# Patient Record
Sex: MALE | Race: BLACK OR AFRICAN AMERICAN | Employment: OTHER | URBAN - METROPOLITAN AREA
[De-identification: names, ages, dates, MRNs, and addresses within clinical notes are randomized per-mention and may not be internally consistent; named-entity substitution may affect disease eponyms.]

---

## 2015-07-15 LAB
LEFT VENTRICULAR EJECTION FRACTION MODE: NORMAL
LV EF: 47 %

## 2017-10-02 PROBLEM — E66.09 NON MORBID OBESITY DUE TO EXCESS CALORIES: Status: ACTIVE | Noted: 2017-10-02

## 2017-10-02 PROBLEM — E78.5 DYSLIPIDEMIA: Status: ACTIVE | Noted: 2017-10-02

## 2017-10-02 PROBLEM — I51.9 LV DYSFUNCTION: Status: ACTIVE | Noted: 2017-10-02

## 2017-11-29 RX ORDER — FENTANYL CITRATE 50 UG/ML
INJECTION, SOLUTION INTRAMUSCULAR; INTRAVENOUS
Status: DISPENSED
Start: 2017-11-29 | End: 2017-11-30

## 2017-11-30 PROBLEM — K40.90 LEFT INGUINAL HERNIA: Status: ACTIVE | Noted: 2017-11-30

## 2018-03-12 ENCOUNTER — HOSPITAL ENCOUNTER (OUTPATIENT)
Dept: GENERAL RADIOLOGY | Age: 68
Discharge: HOME | End: 2018-03-14
Payer: MEDICARE

## 2018-03-12 ENCOUNTER — HOSPITAL ENCOUNTER (OUTPATIENT)
Dept: ULTRASOUND IMAGING | Age: 68
Discharge: HOME | End: 2018-03-14
Payer: MEDICARE

## 2018-03-12 DIAGNOSIS — R10.13 EPIGASTRIC ABDOMINAL PAIN: ICD-10-CM

## 2018-03-12 PROCEDURE — 74241 FL UGI W KUB: CPT

## 2018-03-12 PROCEDURE — 76705 ECHO EXAM OF ABDOMEN: CPT

## 2018-03-13 NOTE — PROGRESS NOTES
remainder of the day due to having had anesthesia. PARKING INSTRUCTIONS:   · [x] Arrival [id number]  · [x] Parking lot I is located on [facility] (the corner of [facility] and [facility]). To enter, press the button and the gate will lift. A free token will be provided to exit the lot. One car per patient is allowed to park in this lot. All other cars are to park on [address] [place] [address] Street either in the parking garage or the handicap lot. [] Free  parking is available on [address] [place] [address]. · [] To reach the [facility] lobby from [address] [place] [address], upon entering the hospital, take elevator B to the 3rd floor. EDUCATION INSTRUCTIONS:      [] Knee or hip replacement booklet & exercise pamphlets given. [] Carlos Norris placed in chart. [] Pre-admission Testing educational folder given  [] Incentive Spirometry,coughing & deep breathing exercises reviewed. []Medication information sheet(s)   []Fluoroscopy-Xray used in surgery reviewed with patient. Educational pamphlet placed in chart. []Pain: Post-op pain is normal and to be expected. You will be asked to rate your pain from 0-10(a zero is not acceptable-education is needed). Your post-op pain goal is:  [] Ask your nurse for your pain medication. [] Joint camp offered. [] Joint replacement booklets given. []Other     MEDICATION INSTRUCTIONS:   [x]Bring a complete list of your medications, please write the last time you took the medicine, give this list to the nurse. [x] Take the following medications the morning of surgery with 1-2 ounces of water:see medication list   [x] Stop herbal supplements and vitamins 5 days before your surgery. [] DO NOT take any diabetic medicine the morning of surgery. Follow instructions for insulin the day before surgery. [] If you are diabetic and your blood sugar is low or you feel symptomatic, you may drink 1-2 ounces of apple juice or take a glucose tablet.   The morning of your procedure, you may call the pre-op area if you have concerns about your blood sugar [phone]. [] Use your inhalers the morning of surgery. Bring your emergency inhaler with you day of surgery. [] Follow physician instructions regarding any blood thinners you may be taking. WHAT TO EXPECT:  [x] The day of surgery you will be greeted and  checked in by the [employer] .  A nurse will greet you in accordance to the time you are needed in the pre-op area to prepare you for surgery. Please do not be discouraged if you are not greeted in the order you arrive as there are many variables that are involved in patient preparation. Your patience is greatly appreciated as you wait for your nurse. Please bring in items such as: books, magazines, newspapers, electronics, or any other items  to occupy your time in the waiting area. [x]  Delays may occur with surgery and staff will make a sincere effort to keep you informed of delays. If any delays occur with your procedure, we apologize ahead of time for your inconvenience as we recognize the value of your time.

## 2018-03-16 ENCOUNTER — ANESTHESIA (OUTPATIENT)
Dept: ENDOSCOPY | Age: 68
End: 2018-03-16
Payer: MEDICARE

## 2018-03-16 ENCOUNTER — ANESTHESIA EVENT (OUTPATIENT)
Dept: ENDOSCOPY | Age: 68
End: 2018-03-16
Payer: MEDICARE

## 2018-03-16 ENCOUNTER — HOSPITAL ENCOUNTER (OUTPATIENT)
Age: 68
Setting detail: OUTPATIENT SURGERY
Discharge: HOME | End: 2018-03-16
Attending: SURGERY | Admitting: SURGERY
Payer: MEDICARE

## 2018-03-16 VITALS
BODY MASS INDEX: 30.48 KG/M2 | WEIGHT: 225 LBS | TEMPERATURE: 98 F | DIASTOLIC BLOOD PRESSURE: 79 MMHG | RESPIRATION RATE: 18 BRPM | HEIGHT: 72 IN | OXYGEN SATURATION: 95 % | HEART RATE: 48 BPM | SYSTOLIC BLOOD PRESSURE: 135 MMHG

## 2018-03-16 VITALS — TEMPERATURE: 98.6 F | SYSTOLIC BLOOD PRESSURE: 122 MMHG | OXYGEN SATURATION: 96 % | DIASTOLIC BLOOD PRESSURE: 77 MMHG

## 2018-03-16 PROCEDURE — 43239 EGD BIOPSY SINGLE/MULTIPLE: CPT | Performed by: SURGERY

## 2018-03-16 PROCEDURE — 2580000003 HC RX 258: Performed by: PHYSICIAN ASSISTANT

## 2018-03-16 PROCEDURE — 3700000001 HC ADD 15 MINUTES (ANESTHESIA): Performed by: SURGERY

## 2018-03-16 PROCEDURE — 88342 IMHCHEM/IMCYTCHM 1ST ANTB: CPT

## 2018-03-16 PROCEDURE — 6360000002 HC RX W HCPCS: Performed by: PHYSICIAN ASSISTANT

## 2018-03-16 PROCEDURE — 7100000010 HC PHASE II RECOVERY - FIRST 15 MIN: Performed by: SURGERY

## 2018-03-16 PROCEDURE — 3700000000 HC ANESTHESIA ATTENDED CARE: Performed by: SURGERY

## 2018-03-16 PROCEDURE — 3609012400 HC EGD TRANSORAL BIOPSY SINGLE/MULTIPLE: Performed by: SURGERY

## 2018-03-16 PROCEDURE — 7100000011 HC PHASE II RECOVERY - ADDTL 15 MIN: Performed by: SURGERY

## 2018-03-16 PROCEDURE — 88305 TISSUE EXAM BY PATHOLOGIST: CPT

## 2018-03-16 RX ORDER — SODIUM CHLORIDE, SODIUM LACTATE, POTASSIUM CHLORIDE, CALCIUM CHLORIDE 600; 310; 30; 20 MG/100ML; MG/100ML; MG/100ML; MG/100ML
INJECTION, SOLUTION INTRAVENOUS CONTINUOUS
Status: DISCONTINUED | OUTPATIENT
Start: 2018-03-16 | End: 2018-03-16 | Stop reason: HOSPADM

## 2018-03-16 RX ORDER — SODIUM CHLORIDE 0.9 % (FLUSH) 0.9 %
10 SYRINGE (ML) INJECTION EVERY 12 HOURS SCHEDULED
Status: DISCONTINUED | OUTPATIENT
Start: 2018-03-16 | End: 2018-03-16 | Stop reason: HOSPADM

## 2018-03-16 RX ORDER — SODIUM CHLORIDE 9 MG/ML
INJECTION, SOLUTION INTRAVENOUS CONTINUOUS PRN
Status: DISCONTINUED | OUTPATIENT
Start: 2018-03-16 | End: 2018-03-16 | Stop reason: SDUPTHER

## 2018-03-16 RX ORDER — PROPOFOL 10 MG/ML
INJECTION, EMULSION INTRAVENOUS PRN
Status: DISCONTINUED | OUTPATIENT
Start: 2018-03-16 | End: 2018-03-16 | Stop reason: SDUPTHER

## 2018-03-16 RX ORDER — SODIUM CHLORIDE 0.9 % (FLUSH) 0.9 %
10 SYRINGE (ML) INJECTION PRN
Status: DISCONTINUED | OUTPATIENT
Start: 2018-03-16 | End: 2018-03-16 | Stop reason: HOSPADM

## 2018-03-16 RX ADMIN — PROPOFOL 230 MG: 10 INJECTION, EMULSION INTRAVENOUS at 08:15

## 2018-03-16 RX ADMIN — SODIUM CHLORIDE: 9 INJECTION, SOLUTION INTRAVENOUS at 07:30

## 2018-03-16 ASSESSMENT — PAIN SCALES - GENERAL
PAINLEVEL_OUTOF10: 0
PAINLEVEL_OUTOF10: 0

## 2018-03-16 ASSESSMENT — PAIN - FUNCTIONAL ASSESSMENT: PAIN_FUNCTIONAL_ASSESSMENT: 0-10

## 2018-03-16 ASSESSMENT — LIFESTYLE VARIABLES: SMOKING_STATUS: 1

## 2018-03-16 NOTE — H&P
GENERAL SURGERY  H & P  3/17/2018     Subjective:  Patient presents to clinic with recurrent epigastric/substernal burning pain and intermittent acid taste in his mouth. He has prior EGD 2015 with Dr. Meryle Lapine that showed gastritis, but normal esophagus with no hiatal hernia, and biopsies were negative for H. Pylori. He has been taking protonix--recently increased from daily to twice daily without much relief. Denies dysphagia. The only thing that has helped is laying down. No nausea or vomiting. No regular ASA or NSAID use.      No f/c, n/v, unexpected weight loss, bloody or tarry emesis or bowel movements.     Objective:  Ht 6' (1.829 m)   Wt 225 lb (102.1 kg)   BMI 30.52 kg/m²    Gen: alert, oriented, no apparent distress  HEENT: NCAT, anicteric  CV: RR  Pulm: nonlabored breathing on room air  Abdomen: soft, nontender, nondistended. Lap incisions well healed. Extremities: moving all extremities  Skin: warm and dry     Assessment/Plan:  74 y.o. male with longstanding history of GERD, presenting with recurrent symptoms. Prior EGD showed gastritis, but no hiatal hernia.     EGD with biopsies.

## 2018-03-16 NOTE — BRIEF OP NOTE
Brief Postoperative Note  ______________________________________________________________    Patient: Ruby Chen.   YOB: 1950  MRN: [id number]  Date of Procedure: 3/16/2018    Pre-Op Diagnosis: EPIGASTRIC ABDOMINAL PAIN    Post-Op Diagnosis: Same       Procedure(s):  EGD BIOPSY    Anesthesia: Monitor Anesthesia Care    Surgeon(s):  Yobani Toure MD    Staff:  * No surgical staff found *     Estimated Blood Loss: * No values recorded between 3/16/2018  8:06 AM and 3/16/2018  2:27 AM * mL    Complications: None    Specimens:   * No specimens in log *    Implants:  * No implants in log *      Drains:      Findings: gastritis, Hiatal hernia with esophagitis    Yobani Toure MD  Date: 3/16/2018  Time: 8:22 AM

## 2018-03-16 NOTE — ANESTHESIA PRE PROCEDURE
Department of Anesthesiology  Preprocedure Note       Name:  Kenyetta Aponte. Age:  74 y.o.  :  1950                                          MRN:  [id number]         Date:  3/16/2018      Surgeon: Dayday Gonzalez):  Catrina Carbajal MD    Procedure: Procedure(s):  EGD ESOPHAGOGASTRODUODENOSCOPY    Medications prior to admission:   Prior to Admission medications    Medication Sig Start Date End Date Taking? Authorizing Provider   losartan (COZAAR) 100 MG tablet Take 1 tablet by mouth every morning 3/7/18  Yes Paul Almonte DO   amLODIPine (NORVASC) 10 MG tablet Take 1 tablet by mouth every morning (before breakfast) 3/7/18  Yes Paul Almonte DO   pantoprazole (PROTONIX) 40 MG tablet Take 1 tablet by mouth 2 times daily 3/7/18  Yes Paul Almonte DO   metoprolol succinate (TOPROL XL) 50 MG extended release tablet Take 1 tablet by mouth every morning 3/7/18  Yes Paul Almonte DO   atorvastatin (LIPITOR) 10 MG tablet Take 1 tablet by mouth nightly 3/7/18  Yes Paul Almonte DO   hydrochlorothiazide (HYDRODIURIL) 12.5 MG tablet Take 1 tablet by mouth daily 3/7/18  Yes Paul Almonte DO   allopurinol (ZYLOPRIM) 100 MG tablet Take 1 tablet by mouth 2 times daily 3/7/18  Yes Paul Almonte DO   cloNIDine (CATAPRES) 0.2 MG tablet Take 1 tablet by mouth 2 times daily 3/7/18  Yes Paul Almonte DO   famotidine (PEPCID) 20 MG tablet Take 1 tablet by mouth 2 times daily as needed (acid reflux) 18  Yes Hallie Brar DO   PERCOCET 7.5-325 MG per tablet Take 1 tablet by mouth every 8 hours as needed  .  17  Yes Historical Provider, MD   aspirin 81 MG EC tablet Take 1 tablet by mouth daily 17  Yes Hallie Brar DO   albuterol sulfate HFA (PROVENTIL HFA) 108 (90 BASE) MCG/ACT inhaler Inhale 2 puffs into the lungs 4 times daily 16  Yes Unique Briceno MD   sodium chloride (OCEAN) 0.65 % nasal spray 1 spray by Nasal route as needed for Congestion 16  Yes Hallie Brar, DO       Current 0.4 05/05/2016    ALKPHOS 102 05/05/2016    AST 16 05/05/2016    ALT 14 05/05/2016       POC Tests: No results for input(s): POCGLU, POCNA, POCK, POCCL, POCBUN, POCHEMO, POCHCT in the last 72 hours. Coags:   Lab Results   Component Value Date    PROTIME 13.5 11/26/2012    INR 1.3 11/26/2012       HCG (If Applicable): No results found for: PREGTESTUR, PREGSERUM, HCG, HCGQUANT     ABGs: No results found for: PHART, PO2ART, MHO5NEI, RFA1BWT, BEART, W6MSBAHI     Type & Screen (If Applicable):  No results found for: LABABO, [address]    Anesthesia Evaluation  Patient summary reviewed and Nursing notes reviewed no history of anesthetic complications:   Airway: Mallampati: III  TM distance: >3 FB   Neck ROM: full  Mouth opening: > = 3 FB Dental:    (+) edentulous      Pulmonary:normal exam    (+) current smoker          Patient smoked on day of surgery. Cardiovascular:    (+) hypertension:, past MI:, CAD:,             Echocardiogram reviewed         Beta Blocker:  Dose within 24 Hrs      ROS comment: LV dysfunction  Cardiomyopathy    Ejection fraction is visually estimated at 45-50% from 2012 echo     Neuro/Psych:   (+) psychiatric history:            GI/Hepatic/Renal:   (+) GERD: poorly controlled,           Endo/Other:    (+) malignancy/cancer (Primary tonsillar squamous cell carcinoma). Abdominal:           Vascular:                                        Anesthesia Plan      MAC     ASA 3       Induction: intravenous. Anesthetic plan and risks discussed with patient. Plan discussed with CRNA and attending. Attending anesthesiologist reviewed and agrees with Pre Eval content    NPO- 3/15/18 @ 1900 per pt         Alfie Johnston RN   3/16/2018    Patient seen and anesthesia pre-op assessment reviewed.   Agree with assessment and plan    Fritz Deshpande MD

## 2018-03-21 ENCOUNTER — OFFICE VISIT (OUTPATIENT)
Dept: SURGERY | Age: 68
End: 2018-03-21
Payer: MEDICARE

## 2018-03-21 VITALS
BODY MASS INDEX: 31.45 KG/M2 | DIASTOLIC BLOOD PRESSURE: 86 MMHG | WEIGHT: 232.2 LBS | TEMPERATURE: 97.6 F | HEIGHT: 72 IN | RESPIRATION RATE: 16 BRPM | HEART RATE: 68 BPM | OXYGEN SATURATION: 97 % | SYSTOLIC BLOOD PRESSURE: 141 MMHG

## 2018-03-21 DIAGNOSIS — K21.00 GERD WITH ESOPHAGITIS: ICD-10-CM

## 2018-03-21 PROCEDURE — G8598 ASA/ANTIPLAT THER USED: HCPCS | Performed by: SURGERY

## 2018-03-21 PROCEDURE — 3017F COLORECTAL CA SCREEN DOC REV: CPT | Performed by: SURGERY

## 2018-03-21 PROCEDURE — 4004F PT TOBACCO SCREEN RCVD TLK: CPT | Performed by: SURGERY

## 2018-03-21 PROCEDURE — G8482 FLU IMMUNIZE ORDER/ADMIN: HCPCS | Performed by: SURGERY

## 2018-03-21 PROCEDURE — G8427 DOCREV CUR MEDS BY ELIG CLIN: HCPCS | Performed by: SURGERY

## 2018-03-21 PROCEDURE — 99212 OFFICE O/P EST SF 10 MIN: CPT

## 2018-03-21 PROCEDURE — 4040F PNEUMOC VAC/ADMIN/RCVD: CPT | Performed by: SURGERY

## 2018-03-21 PROCEDURE — 1123F ACP DISCUSS/DSCN MKR DOCD: CPT | Performed by: SURGERY

## 2018-03-21 PROCEDURE — G8417 CALC BMI ABV UP PARAM F/U: HCPCS | Performed by: SURGERY

## 2018-03-21 PROCEDURE — 99213 OFFICE O/P EST LOW 20 MIN: CPT | Performed by: SURGERY

## 2018-04-23 ENCOUNTER — TELEPHONE (OUTPATIENT)
Dept: SURGERY | Age: 68
End: 2018-04-23

## 2018-05-10 ENCOUNTER — OFFICE VISIT (OUTPATIENT)
Dept: CARDIOLOGY CLINIC | Age: 68
End: 2018-05-10
Payer: MEDICARE

## 2018-05-10 VITALS
HEIGHT: 72 IN | BODY MASS INDEX: 30.23 KG/M2 | RESPIRATION RATE: 18 BRPM | DIASTOLIC BLOOD PRESSURE: 78 MMHG | WEIGHT: 223.2 LBS | SYSTOLIC BLOOD PRESSURE: 130 MMHG | HEART RATE: 52 BPM

## 2018-05-10 DIAGNOSIS — Z01.810 PREOP CARDIOVASCULAR EXAM: Primary | ICD-10-CM

## 2018-05-10 DIAGNOSIS — I25.10 CORONARY ARTERY DISEASE INVOLVING NATIVE CORONARY ARTERY OF NATIVE HEART WITHOUT ANGINA PECTORIS: ICD-10-CM

## 2018-05-10 PROCEDURE — 93000 ELECTROCARDIOGRAM COMPLETE: CPT | Performed by: INTERNAL MEDICINE

## 2018-05-10 PROCEDURE — 99214 OFFICE O/P EST MOD 30 MIN: CPT | Performed by: CLINICAL NURSE SPECIALIST

## 2018-05-14 ENCOUNTER — OFFICE VISIT (OUTPATIENT)
Dept: FAMILY MEDICINE CLINIC | Age: 68
End: 2018-05-14
Payer: MEDICARE

## 2018-05-14 VITALS
DIASTOLIC BLOOD PRESSURE: 84 MMHG | WEIGHT: 226 LBS | RESPIRATION RATE: 14 BRPM | HEART RATE: 87 BPM | HEIGHT: 72 IN | OXYGEN SATURATION: 99 % | SYSTOLIC BLOOD PRESSURE: 131 MMHG | BODY MASS INDEX: 30.61 KG/M2 | TEMPERATURE: 98.7 F

## 2018-05-14 DIAGNOSIS — G47.00 INSOMNIA, UNSPECIFIED TYPE: ICD-10-CM

## 2018-05-14 DIAGNOSIS — R05.9 COUGH: ICD-10-CM

## 2018-05-14 DIAGNOSIS — Z01.818 PRE-OPERATIVE CLEARANCE: Primary | ICD-10-CM

## 2018-05-14 PROCEDURE — G8598 ASA/ANTIPLAT THER USED: HCPCS | Performed by: FAMILY MEDICINE

## 2018-05-14 PROCEDURE — 4004F PT TOBACCO SCREEN RCVD TLK: CPT | Performed by: FAMILY MEDICINE

## 2018-05-14 PROCEDURE — 99213 OFFICE O/P EST LOW 20 MIN: CPT | Performed by: FAMILY MEDICINE

## 2018-05-14 PROCEDURE — G8427 DOCREV CUR MEDS BY ELIG CLIN: HCPCS | Performed by: FAMILY MEDICINE

## 2018-05-14 PROCEDURE — 1123F ACP DISCUSS/DSCN MKR DOCD: CPT | Performed by: FAMILY MEDICINE

## 2018-05-14 PROCEDURE — G8417 CALC BMI ABV UP PARAM F/U: HCPCS | Performed by: FAMILY MEDICINE

## 2018-05-14 PROCEDURE — 4040F PNEUMOC VAC/ADMIN/RCVD: CPT | Performed by: FAMILY MEDICINE

## 2018-05-14 PROCEDURE — 3017F COLORECTAL CA SCREEN DOC REV: CPT | Performed by: FAMILY MEDICINE

## 2018-05-14 PROCEDURE — 99212 OFFICE O/P EST SF 10 MIN: CPT | Performed by: FAMILY MEDICINE

## 2018-05-15 ENCOUNTER — HOSPITAL ENCOUNTER (OUTPATIENT)
Dept: GENERAL RADIOLOGY | Age: 68
Discharge: HOME | End: 2018-05-17
Payer: MEDICARE

## 2018-05-15 ENCOUNTER — HOSPITAL ENCOUNTER (OUTPATIENT)
Age: 68
Discharge: HOME | End: 2018-05-17
Payer: MEDICARE

## 2018-05-15 DIAGNOSIS — R05.9 COUGH: ICD-10-CM

## 2018-05-15 PROCEDURE — 71046 X-RAY EXAM CHEST 2 VIEWS: CPT

## 2018-05-22 ASSESSMENT — ENCOUNTER SYMPTOMS
HEARTBURN: 1
DIARRHEA: 0
WHEEZING: 0
SHORTNESS OF BREATH: 0
VOMITING: 0
CONSTIPATION: 0
NAUSEA: 1
COUGH: 0
ABDOMINAL PAIN: 1

## 2018-05-29 RX ORDER — ASPIRIN 81 MG/1
81 TABLET ORAL DAILY
Qty: 90 TABLET | Refills: 3 | Status: SHIPPED | OUTPATIENT
Start: 2018-05-29 | End: 2018-12-27 | Stop reason: SDUPTHER

## 2018-06-08 DIAGNOSIS — K21.9 GASTROESOPHAGEAL REFLUX DISEASE, ESOPHAGITIS PRESENCE NOT SPECIFIED: ICD-10-CM

## 2018-06-08 DIAGNOSIS — I10 ESSENTIAL HYPERTENSION: ICD-10-CM

## 2018-06-08 DIAGNOSIS — Z87.39 HISTORY OF GOUT: ICD-10-CM

## 2018-06-08 RX ORDER — HYDROCHLOROTHIAZIDE 12.5 MG/1
12.5 TABLET ORAL DAILY
Qty: 90 TABLET | Refills: 0 | Status: SHIPPED | OUTPATIENT
Start: 2018-06-08 | End: 2018-09-05 | Stop reason: SDUPTHER

## 2018-06-08 RX ORDER — ATORVASTATIN CALCIUM 10 MG/1
10 TABLET, FILM COATED ORAL NIGHTLY
Qty: 90 TABLET | Refills: 0 | Status: SHIPPED | OUTPATIENT
Start: 2018-06-08 | End: 2018-10-18 | Stop reason: SDUPTHER

## 2018-06-08 RX ORDER — PANTOPRAZOLE SODIUM 40 MG/1
40 TABLET, DELAYED RELEASE ORAL DAILY
Qty: 90 TABLET | Refills: 0 | Status: SHIPPED | OUTPATIENT
Start: 2018-06-08 | End: 2018-11-21 | Stop reason: SDUPTHER

## 2018-06-08 RX ORDER — LOSARTAN POTASSIUM 100 MG/1
100 TABLET ORAL EVERY MORNING
Qty: 90 TABLET | Refills: 0 | Status: SHIPPED | OUTPATIENT
Start: 2018-06-08 | End: 2018-10-18 | Stop reason: SDUPTHER

## 2018-06-08 RX ORDER — ALLOPURINOL 100 MG/1
100 TABLET ORAL 2 TIMES DAILY
Qty: 180 TABLET | Refills: 0 | Status: SHIPPED | OUTPATIENT
Start: 2018-06-08 | End: 2018-09-05 | Stop reason: SDUPTHER

## 2018-06-08 RX ORDER — METOPROLOL SUCCINATE 50 MG/1
50 TABLET, EXTENDED RELEASE ORAL EVERY MORNING
Qty: 90 TABLET | Refills: 0 | Status: SHIPPED | OUTPATIENT
Start: 2018-06-08 | End: 2018-09-17 | Stop reason: SDUPTHER

## 2018-06-08 RX ORDER — AMLODIPINE BESYLATE 10 MG/1
10 TABLET ORAL
Qty: 90 TABLET | Refills: 0 | Status: SHIPPED | OUTPATIENT
Start: 2018-06-08 | End: 2018-09-14 | Stop reason: SDUPTHER

## 2018-06-11 ENCOUNTER — HOSPITAL ENCOUNTER (OUTPATIENT)
Dept: CARDIOLOGY | Age: 68
Discharge: HOME | End: 2018-06-11
Payer: MEDICARE

## 2018-06-11 VITALS
BODY MASS INDEX: 30.2 KG/M2 | SYSTOLIC BLOOD PRESSURE: 144 MMHG | DIASTOLIC BLOOD PRESSURE: 90 MMHG | WEIGHT: 223 LBS | HEART RATE: 76 BPM | HEIGHT: 72 IN

## 2018-06-11 DIAGNOSIS — I25.10 CORONARY ARTERY DISEASE INVOLVING NATIVE CORONARY ARTERY OF NATIVE HEART WITHOUT ANGINA PECTORIS: ICD-10-CM

## 2018-06-11 DIAGNOSIS — Z01.810 PREOP CARDIOVASCULAR EXAM: ICD-10-CM

## 2018-06-11 LAB
LV EF: 50 %
LVEF MODALITY: NORMAL

## 2018-06-11 PROCEDURE — 93306 TTE W/DOPPLER COMPLETE: CPT

## 2018-06-11 PROCEDURE — 6360000002 HC RX W HCPCS: Performed by: INTERNAL MEDICINE

## 2018-06-11 PROCEDURE — 93017 CV STRESS TEST TRACING ONLY: CPT

## 2018-06-11 PROCEDURE — 78452 HT MUSCLE IMAGE SPECT MULT: CPT

## 2018-06-11 PROCEDURE — 3430000000 HC RX DIAGNOSTIC RADIOPHARMACEUTICAL: Performed by: INTERNAL MEDICINE

## 2018-06-11 PROCEDURE — 2580000003 HC RX 258: Performed by: INTERNAL MEDICINE

## 2018-06-11 PROCEDURE — A9502 TC99M TETROFOSMIN: HCPCS | Performed by: INTERNAL MEDICINE

## 2018-06-11 RX ORDER — SODIUM CHLORIDE 0.9 % (FLUSH) 0.9 %
10 SYRINGE (ML) INJECTION PRN
Status: DISCONTINUED | OUTPATIENT
Start: 2018-06-11 | End: 2018-06-12 | Stop reason: HOSPADM

## 2018-06-11 RX ADMIN — REGADENOSON 0.4 MG: 0.08 INJECTION, SOLUTION INTRAVENOUS at 11:31

## 2018-06-11 RX ADMIN — TETROFOSMIN 31.2 MILLICURIE: 0.23 INJECTION, POWDER, LYOPHILIZED, FOR SOLUTION INTRAVENOUS at 11:31

## 2018-06-11 RX ADMIN — TETROFOSMIN 10.7 MILLICURIE: 0.23 INJECTION, POWDER, LYOPHILIZED, FOR SOLUTION INTRAVENOUS at 09:09

## 2018-06-11 RX ADMIN — Medication 10 ML: at 11:30

## 2018-06-11 RX ADMIN — Medication 10 ML: at 09:09

## 2018-06-11 RX ADMIN — Medication 10 ML: at 11:31

## 2018-06-18 DIAGNOSIS — I10 ESSENTIAL HYPERTENSION: ICD-10-CM

## 2018-06-18 RX ORDER — CLONIDINE HYDROCHLORIDE 0.2 MG/1
0.2 TABLET ORAL 2 TIMES DAILY
Qty: 180 TABLET | Refills: 0 | Status: SHIPPED | OUTPATIENT
Start: 2018-06-18 | End: 2018-09-14 | Stop reason: SDUPTHER

## 2018-06-20 ENCOUNTER — OFFICE VISIT (OUTPATIENT)
Dept: FAMILY MEDICINE CLINIC | Age: 68
End: 2018-06-20
Payer: MEDICARE

## 2018-06-20 VITALS
RESPIRATION RATE: 18 BRPM | TEMPERATURE: 97.8 F | HEART RATE: 60 BPM | BODY MASS INDEX: 29.8 KG/M2 | OXYGEN SATURATION: 98 % | WEIGHT: 220 LBS | SYSTOLIC BLOOD PRESSURE: 134 MMHG | DIASTOLIC BLOOD PRESSURE: 82 MMHG | HEIGHT: 72 IN

## 2018-06-20 DIAGNOSIS — I10 ESSENTIAL HYPERTENSION: ICD-10-CM

## 2018-06-20 DIAGNOSIS — K21.00 GERD WITH ESOPHAGITIS: ICD-10-CM

## 2018-06-20 DIAGNOSIS — I25.10 CORONARY ARTERY DISEASE INVOLVING NATIVE CORONARY ARTERY OF NATIVE HEART WITHOUT ANGINA PECTORIS: ICD-10-CM

## 2018-06-20 DIAGNOSIS — E78.5 DYSLIPIDEMIA: ICD-10-CM

## 2018-06-20 PROCEDURE — G8427 DOCREV CUR MEDS BY ELIG CLIN: HCPCS | Performed by: STUDENT IN AN ORGANIZED HEALTH CARE EDUCATION/TRAINING PROGRAM

## 2018-06-20 PROCEDURE — 99213 OFFICE O/P EST LOW 20 MIN: CPT | Performed by: STUDENT IN AN ORGANIZED HEALTH CARE EDUCATION/TRAINING PROGRAM

## 2018-06-20 PROCEDURE — 4004F PT TOBACCO SCREEN RCVD TLK: CPT | Performed by: STUDENT IN AN ORGANIZED HEALTH CARE EDUCATION/TRAINING PROGRAM

## 2018-06-20 PROCEDURE — 3017F COLORECTAL CA SCREEN DOC REV: CPT | Performed by: STUDENT IN AN ORGANIZED HEALTH CARE EDUCATION/TRAINING PROGRAM

## 2018-06-20 PROCEDURE — 1123F ACP DISCUSS/DSCN MKR DOCD: CPT | Performed by: STUDENT IN AN ORGANIZED HEALTH CARE EDUCATION/TRAINING PROGRAM

## 2018-06-20 PROCEDURE — 99212 OFFICE O/P EST SF 10 MIN: CPT | Performed by: STUDENT IN AN ORGANIZED HEALTH CARE EDUCATION/TRAINING PROGRAM

## 2018-06-20 PROCEDURE — 4040F PNEUMOC VAC/ADMIN/RCVD: CPT | Performed by: STUDENT IN AN ORGANIZED HEALTH CARE EDUCATION/TRAINING PROGRAM

## 2018-06-20 PROCEDURE — G8417 CALC BMI ABV UP PARAM F/U: HCPCS | Performed by: STUDENT IN AN ORGANIZED HEALTH CARE EDUCATION/TRAINING PROGRAM

## 2018-06-20 PROCEDURE — G8598 ASA/ANTIPLAT THER USED: HCPCS | Performed by: STUDENT IN AN ORGANIZED HEALTH CARE EDUCATION/TRAINING PROGRAM

## 2018-06-20 RX ORDER — PREDNISOLONE ACETATE 10 MG/ML
SUSPENSION/ DROPS OPHTHALMIC
COMMUNITY
Start: 2018-06-15 | End: 2019-04-02 | Stop reason: ALTCHOICE

## 2018-06-20 RX ORDER — OFLOXACIN 3 MG/ML
1 SOLUTION/ DROPS OPHTHALMIC 3 TIMES DAILY
COMMUNITY
Start: 2018-06-15 | End: 2019-04-02 | Stop reason: ALTCHOICE

## 2018-06-20 RX ORDER — KETOROLAC TROMETHAMINE 5 MG/ML
1 SOLUTION OPHTHALMIC
COMMUNITY
Start: 2018-06-15 | End: 2019-04-02 | Stop reason: ALTCHOICE

## 2018-06-20 RX ORDER — NEOMYCIN SULFATE, POLYMYXIN B SULFATE AND DEXAMETHASONE 3.5; 10000; 1 MG/ML; [USP'U]/ML; MG/ML
1 SUSPENSION/ DROPS OPHTHALMIC 3 TIMES DAILY
Refills: 0 | COMMUNITY
Start: 2018-06-15 | End: 2019-04-02 | Stop reason: ALTCHOICE

## 2018-06-20 ASSESSMENT — ENCOUNTER SYMPTOMS
WHEEZING: 0
SHORTNESS OF BREATH: 0
ABDOMINAL PAIN: 0
HEARTBURN: 0
SORE THROAT: 1

## 2018-06-22 ENCOUNTER — TELEPHONE (OUTPATIENT)
Dept: FAMILY MEDICINE CLINIC | Age: 68
End: 2018-06-22

## 2018-07-10 ENCOUNTER — OFFICE VISIT (OUTPATIENT)
Dept: FAMILY MEDICINE CLINIC | Age: 68
End: 2018-07-10
Payer: MEDICARE

## 2018-07-10 ENCOUNTER — HOSPITAL ENCOUNTER (OUTPATIENT)
Dept: GENERAL RADIOLOGY | Age: 68
Discharge: HOME | End: 2018-07-12
Payer: MEDICARE

## 2018-07-10 ENCOUNTER — HOSPITAL ENCOUNTER (OUTPATIENT)
Age: 68
Discharge: HOME | End: 2018-07-12
Payer: MEDICARE

## 2018-07-10 VITALS
BODY MASS INDEX: 29.57 KG/M2 | OXYGEN SATURATION: 97 % | WEIGHT: 218 LBS | RESPIRATION RATE: 22 BRPM | SYSTOLIC BLOOD PRESSURE: 139 MMHG | HEART RATE: 74 BPM | TEMPERATURE: 98 F | DIASTOLIC BLOOD PRESSURE: 87 MMHG

## 2018-07-10 DIAGNOSIS — R06.2 WHEEZING: Primary | ICD-10-CM

## 2018-07-10 DIAGNOSIS — R06.2 WHEEZING: ICD-10-CM

## 2018-07-10 PROCEDURE — G8598 ASA/ANTIPLAT THER USED: HCPCS | Performed by: FAMILY MEDICINE

## 2018-07-10 PROCEDURE — 99213 OFFICE O/P EST LOW 20 MIN: CPT | Performed by: FAMILY MEDICINE

## 2018-07-10 PROCEDURE — 71046 X-RAY EXAM CHEST 2 VIEWS: CPT

## 2018-07-10 PROCEDURE — 3017F COLORECTAL CA SCREEN DOC REV: CPT | Performed by: FAMILY MEDICINE

## 2018-07-10 PROCEDURE — 4040F PNEUMOC VAC/ADMIN/RCVD: CPT | Performed by: FAMILY MEDICINE

## 2018-07-10 PROCEDURE — 4004F PT TOBACCO SCREEN RCVD TLK: CPT | Performed by: FAMILY MEDICINE

## 2018-07-10 PROCEDURE — G8427 DOCREV CUR MEDS BY ELIG CLIN: HCPCS | Performed by: FAMILY MEDICINE

## 2018-07-10 PROCEDURE — G8417 CALC BMI ABV UP PARAM F/U: HCPCS | Performed by: FAMILY MEDICINE

## 2018-07-10 PROCEDURE — 99212 OFFICE O/P EST SF 10 MIN: CPT | Performed by: FAMILY MEDICINE

## 2018-07-10 PROCEDURE — 1101F PT FALLS ASSESS-DOCD LE1/YR: CPT | Performed by: FAMILY MEDICINE

## 2018-07-10 PROCEDURE — 1123F ACP DISCUSS/DSCN MKR DOCD: CPT | Performed by: FAMILY MEDICINE

## 2018-07-10 RX ORDER — CLONIDINE HYDROCHLORIDE 0.2 MG/1
TABLET ORAL
COMMUNITY
Start: 2018-06-18 | End: 2018-07-10 | Stop reason: SDUPTHER

## 2018-07-10 RX ORDER — DOXYCYCLINE HYCLATE 100 MG
100 TABLET ORAL 2 TIMES DAILY
Qty: 14 TABLET | Refills: 0 | Status: SHIPPED | OUTPATIENT
Start: 2018-07-10 | End: 2018-07-17

## 2018-07-10 RX ORDER — PREDNISONE 20 MG/1
20 TABLET ORAL DAILY
Qty: 10 TABLET | Refills: 0 | Status: SHIPPED | OUTPATIENT
Start: 2018-07-10 | End: 2018-07-16 | Stop reason: SDUPTHER

## 2018-07-10 RX ORDER — GUAIFENESIN 600 MG/1
600 TABLET, EXTENDED RELEASE ORAL 2 TIMES DAILY
Qty: 20 TABLET | Refills: 0 | Status: SHIPPED | OUTPATIENT
Start: 2018-07-10 | End: 2019-01-02 | Stop reason: ALTCHOICE

## 2018-07-10 NOTE — PROGRESS NOTES
[facility] RESIDENCY PROGRAM   OFFICE PROGRESS NOTE  DATE OF VISIT : 2018    Patient : Desirae Began. Sex : male   Age : 79 y.o.  : 1950   MRN : [id number]            History of Present Illness : Desirae Began.  79 y.o. who presented to the clinic today for Congestion; Wheezing; and Cough    Cough X 3 DAYS   Congested, cough, not much phlegm, runny nose+, no ear issues. some wheezing   No sobar or feliciano  No fever or chills  Now started to have some epigastric pain every time he coughs  Using albuetrol inh some, more often than prior  Has not been investigated for of copd, chronic smoker   ciggs - 4 ciggs/day, not been smokign X  3 days since onset of sx  No sick cotnacts,      Has cataract surgery coming up in 1 week and wishes to be better before that. PMH/PSH, SH and FH were reviewed in the chart. Med list was reviewed and modified if needed. Review of Systems :    General- Denies fatigue, or unintentional weight changes, fever or chills   HENT + headache, no  visual disturbance  Chest- no chest pain, SOB    Heart- no pedal edema, no palpitations, chest pain, exertional dyspnea  Gastrointestinal - No diarrhea, nausea, constipation  - No dysuria  Ext- Denies weakness or paresthesias.          Physical Exam :    VITAL SIGNS : Blood pressure 139/87, pulse 74, temperature 98 °F (36.7 °C), temperature source Oral, resp. rate 22, weight 218 lb (98.9 kg), SpO2 97 %. GENERAL APPEARANCE : NAD, cooperative, appears stated age  EYES : PERRL, EOM's intact, anicteric sclerae  HENT : AT/NC, oropharynx clear and without exudates  NECK : Supple, no thyromegaly  LUNGS : Respiration unlabored, rhonchi b/l  HEART : RRR, normal S1/S2, no murmur noted  ABDOMEN : Normoactive bowel sounds, soft, non-tender, no masses  EXTREMITIES : No peripheral edema         Assessment & Plan :    Perez Rodriguez was seen today for congestion, wheezing and cough.     Diagnoses and all

## 2018-07-16 ENCOUNTER — OFFICE VISIT (OUTPATIENT)
Dept: FAMILY MEDICINE CLINIC | Age: 68
End: 2018-07-16
Payer: MEDICARE

## 2018-07-16 VITALS
RESPIRATION RATE: 18 BRPM | HEART RATE: 71 BPM | DIASTOLIC BLOOD PRESSURE: 81 MMHG | TEMPERATURE: 97.6 F | BODY MASS INDEX: 29.66 KG/M2 | HEIGHT: 72 IN | SYSTOLIC BLOOD PRESSURE: 120 MMHG | WEIGHT: 219 LBS | OXYGEN SATURATION: 93 %

## 2018-07-16 DIAGNOSIS — J44.1 COPD EXACERBATION (HCC): ICD-10-CM

## 2018-07-16 PROCEDURE — 99213 OFFICE O/P EST LOW 20 MIN: CPT | Performed by: STUDENT IN AN ORGANIZED HEALTH CARE EDUCATION/TRAINING PROGRAM

## 2018-07-16 PROCEDURE — 3017F COLORECTAL CA SCREEN DOC REV: CPT | Performed by: STUDENT IN AN ORGANIZED HEALTH CARE EDUCATION/TRAINING PROGRAM

## 2018-07-16 PROCEDURE — 4004F PT TOBACCO SCREEN RCVD TLK: CPT | Performed by: STUDENT IN AN ORGANIZED HEALTH CARE EDUCATION/TRAINING PROGRAM

## 2018-07-16 PROCEDURE — 1101F PT FALLS ASSESS-DOCD LE1/YR: CPT | Performed by: STUDENT IN AN ORGANIZED HEALTH CARE EDUCATION/TRAINING PROGRAM

## 2018-07-16 PROCEDURE — 1123F ACP DISCUSS/DSCN MKR DOCD: CPT | Performed by: STUDENT IN AN ORGANIZED HEALTH CARE EDUCATION/TRAINING PROGRAM

## 2018-07-16 PROCEDURE — G8926 SPIRO NO PERF OR DOC: HCPCS | Performed by: STUDENT IN AN ORGANIZED HEALTH CARE EDUCATION/TRAINING PROGRAM

## 2018-07-16 PROCEDURE — G8427 DOCREV CUR MEDS BY ELIG CLIN: HCPCS | Performed by: STUDENT IN AN ORGANIZED HEALTH CARE EDUCATION/TRAINING PROGRAM

## 2018-07-16 PROCEDURE — 3023F SPIROM DOC REV: CPT | Performed by: STUDENT IN AN ORGANIZED HEALTH CARE EDUCATION/TRAINING PROGRAM

## 2018-07-16 PROCEDURE — G8417 CALC BMI ABV UP PARAM F/U: HCPCS | Performed by: STUDENT IN AN ORGANIZED HEALTH CARE EDUCATION/TRAINING PROGRAM

## 2018-07-16 PROCEDURE — 4040F PNEUMOC VAC/ADMIN/RCVD: CPT | Performed by: STUDENT IN AN ORGANIZED HEALTH CARE EDUCATION/TRAINING PROGRAM

## 2018-07-16 PROCEDURE — 99212 OFFICE O/P EST SF 10 MIN: CPT | Performed by: STUDENT IN AN ORGANIZED HEALTH CARE EDUCATION/TRAINING PROGRAM

## 2018-07-16 PROCEDURE — 6360000002 HC RX W HCPCS

## 2018-07-16 PROCEDURE — G8598 ASA/ANTIPLAT THER USED: HCPCS | Performed by: STUDENT IN AN ORGANIZED HEALTH CARE EDUCATION/TRAINING PROGRAM

## 2018-07-16 RX ORDER — PREDNISONE 20 MG/1
20 TABLET ORAL DAILY
Qty: 3 TABLET | Refills: 0 | Status: SHIPPED | OUTPATIENT
Start: 2018-07-16 | End: 2018-07-19

## 2018-07-16 NOTE — PROGRESS NOTES
tablet by mouth 2 times daily as needed (acid reflux) 60 tablet 3    PERCOCET 7.5-325 MG per tablet Take 1 tablet by mouth every 8 hours as needed  .  albuterol sulfate HFA (PROVENTIL HFA) 108 (90 BASE) MCG/ACT inhaler Inhale 2 puffs into the lungs 4 times daily (Patient taking differently: Inhale 2 puffs into the lungs every 6 hours as needed ) 1 Inhaler 2    ofloxacin (OCUFLOX) 0.3 % solution Place 1 drop into the right eye 3 times daily      sodium chloride (OCEAN) 0.65 % nasal spray 1 spray by Nasal route as needed for Congestion 1 Bottle 0     No current facility-administered medications for this visit. Allergies: Patient has no known allergies. Review of Systems   Constitutional: Negative for chills and fever. Respiratory: Positive for cough and shortness of breath (improved). Negative for wheezing. Cardiovascular: Negative for chest pain and palpitations. Musculoskeletal: Negative for arthralgias and myalgias. Skin: Negative for rash. /81 (Site: Right Arm, Position: Sitting, Cuff Size: Medium Adult)   Pulse 71   Temp 97.6 °F (36.4 °C) (Oral)   Resp 18   Ht 6' (1.829 m)   Wt 219 lb (99.3 kg)   SpO2 93%   BMI 29.70 kg/m²     Physical Exam   Constitutional: He appears well-developed and well-nourished. No distress. Cardiovascular: Normal rate, regular rhythm, normal heart sounds and intact distal pulses. Exam reveals no gallop and no friction rub. No murmur heard. Pulmonary/Chest: He has no decreased breath sounds. He has wheezes (throughout all lung fields). He has no rhonchi. He has no rales. Abdominal: Soft. Bowel sounds are normal. He exhibits no distension. There is no tenderness. Musculoskeletal: He exhibits no edema. Skin: He is not diaphoretic. Assessment and Plan:  Jeff Anna was seen today for wheezing. Diagnoses and all orders for this visit:    COPD exacerbation ([address]  Still with some wheezing on expiration.   Will add steroid burst for 3 further days. Will obtain spirometry with and without bronchodilator at patient's earliest convenience to better characterize his lung problems. Further adjustments in treatment based on results. -     Spirometry with bronchodilator; Future  -     Spirometry without bronchodilator; Future  -     predniSONE (DELTASONE) 20 MG tablet; Take 1 tablet by mouth daily for 3 days Two tablets qd for 5 days      Call or go to ED immediately if symptoms worsen or persist.  Return in about 2 weeks (around 7/30/2018) for Exam following spirometry results. , or sooner if necessary. All questions answered. Jose Miguel Martins MD  Family Medicine Resident, PGY-3

## 2018-07-18 ENCOUNTER — NURSE ONLY (OUTPATIENT)
Dept: FAMILY MEDICINE CLINIC | Age: 68
End: 2018-07-18
Payer: MEDICARE

## 2018-07-18 VITALS
OXYGEN SATURATION: 97 % | SYSTOLIC BLOOD PRESSURE: 138 MMHG | WEIGHT: 222 LBS | HEART RATE: 70 BPM | BODY MASS INDEX: 30.07 KG/M2 | RESPIRATION RATE: 18 BRPM | HEIGHT: 72 IN | TEMPERATURE: 98.4 F | DIASTOLIC BLOOD PRESSURE: 86 MMHG

## 2018-07-18 DIAGNOSIS — Z72.0 TOBACCO ABUSE: ICD-10-CM

## 2018-07-18 DIAGNOSIS — J44.1 COPD EXACERBATION (HCC): ICD-10-CM

## 2018-07-18 LAB
DLCO %PRED: NORMAL
DLCO/VA %PRED: NORMAL
DLCO: NORMAL ML/MMHG SEC
FEF 25-75% PRE PRED: NORMAL
FEF 25-75%-PRE: NORMAL
FEV1 %PRED-PRE: NORMAL
FEV1/FVC PRED: NORMAL
FEV1/FVC: NORMAL %
FEV1: NORMAL LITERS
FEV3 PRE: NORMAL
FVC %PRED-PRE: NORMAL
FVC: NORMAL LITERS
MEP: NORMAL
MIP: NORMAL
PEF %PRED-PRE: NORMAL
PEF-PRE: NORMAL L/SEC
TLC %PRED: NORMAL
TLC: NORMAL LITERS

## 2018-07-18 PROCEDURE — 94010 BREATHING CAPACITY TEST: CPT | Performed by: FAMILY MEDICINE

## 2018-07-18 RX ORDER — ALBUTEROL SULFATE 2.5 MG/3ML
2.5 SOLUTION RESPIRATORY (INHALATION) ONCE
Status: COMPLETED | OUTPATIENT
Start: 2018-07-18 | End: 2018-07-18

## 2018-07-18 RX ADMIN — ALBUTEROL SULFATE 2.5 MG: 2.5 SOLUTION RESPIRATORY (INHALATION) at 09:46

## 2018-07-23 ASSESSMENT — ENCOUNTER SYMPTOMS
SHORTNESS OF BREATH: 1
COUGH: 1
WHEEZING: 0

## 2018-07-31 ENCOUNTER — OFFICE VISIT (OUTPATIENT)
Dept: FAMILY MEDICINE CLINIC | Age: 68
End: 2018-07-31
Payer: MEDICARE

## 2018-07-31 VITALS
TEMPERATURE: 97.7 F | HEART RATE: 70 BPM | WEIGHT: 221 LBS | OXYGEN SATURATION: 96 % | RESPIRATION RATE: 12 BRPM | DIASTOLIC BLOOD PRESSURE: 78 MMHG | HEIGHT: 72 IN | SYSTOLIC BLOOD PRESSURE: 118 MMHG | BODY MASS INDEX: 29.93 KG/M2

## 2018-07-31 DIAGNOSIS — J44.1 COPD EXACERBATION (HCC): ICD-10-CM

## 2018-07-31 DIAGNOSIS — Z72.0 TOBACCO ABUSE: ICD-10-CM

## 2018-07-31 DIAGNOSIS — N52.2 DRUG-INDUCED ERECTILE DYSFUNCTION: ICD-10-CM

## 2018-07-31 PROCEDURE — G8427 DOCREV CUR MEDS BY ELIG CLIN: HCPCS | Performed by: STUDENT IN AN ORGANIZED HEALTH CARE EDUCATION/TRAINING PROGRAM

## 2018-07-31 PROCEDURE — 99213 OFFICE O/P EST LOW 20 MIN: CPT | Performed by: STUDENT IN AN ORGANIZED HEALTH CARE EDUCATION/TRAINING PROGRAM

## 2018-07-31 PROCEDURE — 3023F SPIROM DOC REV: CPT | Performed by: STUDENT IN AN ORGANIZED HEALTH CARE EDUCATION/TRAINING PROGRAM

## 2018-07-31 PROCEDURE — G8926 SPIRO NO PERF OR DOC: HCPCS | Performed by: STUDENT IN AN ORGANIZED HEALTH CARE EDUCATION/TRAINING PROGRAM

## 2018-07-31 PROCEDURE — 99212 OFFICE O/P EST SF 10 MIN: CPT | Performed by: STUDENT IN AN ORGANIZED HEALTH CARE EDUCATION/TRAINING PROGRAM

## 2018-07-31 PROCEDURE — G8417 CALC BMI ABV UP PARAM F/U: HCPCS | Performed by: STUDENT IN AN ORGANIZED HEALTH CARE EDUCATION/TRAINING PROGRAM

## 2018-07-31 PROCEDURE — 1101F PT FALLS ASSESS-DOCD LE1/YR: CPT | Performed by: STUDENT IN AN ORGANIZED HEALTH CARE EDUCATION/TRAINING PROGRAM

## 2018-07-31 PROCEDURE — 4040F PNEUMOC VAC/ADMIN/RCVD: CPT | Performed by: STUDENT IN AN ORGANIZED HEALTH CARE EDUCATION/TRAINING PROGRAM

## 2018-07-31 PROCEDURE — G8598 ASA/ANTIPLAT THER USED: HCPCS | Performed by: STUDENT IN AN ORGANIZED HEALTH CARE EDUCATION/TRAINING PROGRAM

## 2018-07-31 PROCEDURE — 1123F ACP DISCUSS/DSCN MKR DOCD: CPT | Performed by: STUDENT IN AN ORGANIZED HEALTH CARE EDUCATION/TRAINING PROGRAM

## 2018-07-31 PROCEDURE — 3017F COLORECTAL CA SCREEN DOC REV: CPT | Performed by: STUDENT IN AN ORGANIZED HEALTH CARE EDUCATION/TRAINING PROGRAM

## 2018-07-31 PROCEDURE — 4004F PT TOBACCO SCREEN RCVD TLK: CPT | Performed by: STUDENT IN AN ORGANIZED HEALTH CARE EDUCATION/TRAINING PROGRAM

## 2018-07-31 RX ORDER — SILDENAFIL 100 MG/1
100 TABLET, FILM COATED ORAL PRN
Qty: 10 TABLET | Refills: 5 | Status: ON HOLD
Start: 2018-07-31 | End: 2020-02-21 | Stop reason: HOSPADM

## 2018-07-31 NOTE — PROGRESS NOTES
screen given patient's smoking history, patient declines at this time. Again stressed importance of abstinence from smoking given patient's lung disease.  -     FULL PFT STUDY; Future    Drug-induced erectile dysfunction  Will trial sildenafil again, patient given Good Rx card to help with affording medication. -     sildenafil (VIAGRA) 100 MG tablet; Take 1 tablet by mouth as needed for Erectile Dysfunction      Call or go to ED immediately if symptoms worsen or persist.  Return in about 1 month (around 8/31/2018) for Exam following pulmonary problems, PFT., or sooner if necessary. All questions answered. Jose Miguel Patel MD  Family Medicine Resident, PGY-3

## 2018-08-09 ASSESSMENT — ENCOUNTER SYMPTOMS
WHEEZING: 0
COUGH: 0
SHORTNESS OF BREATH: 0

## 2018-08-24 ENCOUNTER — OFFICE VISIT (OUTPATIENT)
Dept: FAMILY MEDICINE CLINIC | Age: 68
End: 2018-08-24
Payer: MEDICARE

## 2018-08-24 VITALS
DIASTOLIC BLOOD PRESSURE: 78 MMHG | RESPIRATION RATE: 18 BRPM | HEIGHT: 72 IN | HEART RATE: 59 BPM | TEMPERATURE: 97.7 F | SYSTOLIC BLOOD PRESSURE: 138 MMHG | BODY MASS INDEX: 29.8 KG/M2 | OXYGEN SATURATION: 96 % | WEIGHT: 220 LBS

## 2018-08-24 DIAGNOSIS — M19.011 OSTEOARTHRITIS OF RIGHT SHOULDER, UNSPECIFIED OSTEOARTHRITIS TYPE: Primary | ICD-10-CM

## 2018-08-24 PROCEDURE — G8427 DOCREV CUR MEDS BY ELIG CLIN: HCPCS | Performed by: FAMILY MEDICINE

## 2018-08-24 PROCEDURE — 3017F COLORECTAL CA SCREEN DOC REV: CPT | Performed by: FAMILY MEDICINE

## 2018-08-24 PROCEDURE — 99212 OFFICE O/P EST SF 10 MIN: CPT | Performed by: STUDENT IN AN ORGANIZED HEALTH CARE EDUCATION/TRAINING PROGRAM

## 2018-08-24 PROCEDURE — 4004F PT TOBACCO SCREEN RCVD TLK: CPT | Performed by: FAMILY MEDICINE

## 2018-08-24 PROCEDURE — G8598 ASA/ANTIPLAT THER USED: HCPCS | Performed by: FAMILY MEDICINE

## 2018-08-24 PROCEDURE — 99213 OFFICE O/P EST LOW 20 MIN: CPT | Performed by: STUDENT IN AN ORGANIZED HEALTH CARE EDUCATION/TRAINING PROGRAM

## 2018-08-24 PROCEDURE — G8417 CALC BMI ABV UP PARAM F/U: HCPCS | Performed by: FAMILY MEDICINE

## 2018-08-24 PROCEDURE — 1123F ACP DISCUSS/DSCN MKR DOCD: CPT | Performed by: FAMILY MEDICINE

## 2018-08-24 PROCEDURE — 4040F PNEUMOC VAC/ADMIN/RCVD: CPT | Performed by: FAMILY MEDICINE

## 2018-08-24 PROCEDURE — 1101F PT FALLS ASSESS-DOCD LE1/YR: CPT | Performed by: FAMILY MEDICINE

## 2018-08-24 RX ORDER — NAPROXEN 500 MG/1
500 TABLET ORAL 2 TIMES DAILY WITH MEALS
Qty: 60 TABLET | Refills: 3 | Status: SHIPPED
Start: 2018-08-24 | End: 2020-02-19 | Stop reason: ALTCHOICE

## 2018-08-24 ASSESSMENT — PATIENT HEALTH QUESTIONNAIRE - PHQ9
2. FEELING DOWN, DEPRESSED OR HOPELESS: 0
SUM OF ALL RESPONSES TO PHQ QUESTIONS 1-9: 0
1. LITTLE INTEREST OR PLEASURE IN DOING THINGS: 0
SUM OF ALL RESPONSES TO PHQ QUESTIONS 1-9: 0
SUM OF ALL RESPONSES TO PHQ9 QUESTIONS 1 & 2: 0

## 2018-08-24 NOTE — PROGRESS NOTES
2018     Chastity [address] [place] [address] (:  1950) is a 79 y.o. male, here for evaluation of the following medical concerns:    79year old male presenting with right shoulder pain. Patient reports started one month ago and awoke with the pain one morning. Pain worse with extension of shoulder. Denies trauma or injury. Pain localized to shoulder. No associated numbness or tingling or weakness. Pain is constant throughout the day and is 7/10. He does see a pain clinic for lower back pain on 7.5 percocet. No other concerns today. Review of Systems   Constitutional: Negative for activity change, appetite change and fever. HENT: Negative for congestion, rhinorrhea and sore throat. Respiratory: Negative for cough, shortness of breath and wheezing. Cardiovascular: Negative for chest pain, palpitations and leg swelling. Gastrointestinal: Negative for constipation, diarrhea, nausea and vomiting. Genitourinary: Negative for dysuria, frequency and urgency. Musculoskeletal: Positive for arthralgias and back pain. Negative for gait problem and myalgias. Skin: Negative for color change, pallor, rash and wound. Neurological: Negative for weakness, numbness and headaches. Psychiatric/Behavioral: Negative for agitation and suicidal ideas. The patient is not nervous/anxious. Prior to Visit Medications    Medication Sig Taking?  Authorizing Provider   naproxen (NAPROXEN) 500 MG EC tablet Take 1 tablet by mouth 2 times daily (with meals) Yes Rajinder Choudhury MD   sildenafil (VIAGRA) 100 MG tablet Take 1 tablet by mouth as needed for Erectile Dysfunction Yes Tj Ruth MD   guaiFENesin (MUCINEX) 600 MG extended release tablet Take 1 tablet by mouth 2 times daily Yes Wilfrido Isaacs MD   ofloxacin (OCUFLOX) 0.3 % solution Place 1 drop into the right eye 3 times daily Yes Historical Provider, MD   prednisoLONE acetate (PRED FORTE) 1 % ophthalmic suspension  Yes Historical Provider, MD Sitting   Cuff Size: Medium Adult   Pulse: 59   Resp: 18   Temp: 97.7 °F (36.5 °C)   TempSrc: Oral   SpO2: 96%   Weight: 220 lb (99.8 kg)   Height: 6' (1.829 m)     Estimated body mass index is 29.84 kg/m² as calculated from the following:    Height as of this encounter: 6' (1.829 m). Weight as of this encounter: 220 lb (99.8 kg). Physical Exam   Constitutional: He is oriented to person, place, and time. He appears well-developed and well-nourished. He appears distressed. Eyes: Conjunctivae and EOM are normal. Right eye exhibits no discharge. Left eye exhibits no discharge. Cardiovascular: Normal rate, regular rhythm, normal heart sounds and intact distal pulses. No murmur heard. Pulmonary/Chest: Effort normal and breath sounds normal. No respiratory distress. Abdominal: Soft. Bowel sounds are normal. He exhibits no distension. There is no tenderness. Musculoskeletal:        Right shoulder: He exhibits decreased range of motion, tenderness and crepitus. He exhibits no swelling, no effusion, no deformity and normal strength. Negative neers and perez. Positive empty can. Limited active range of motion, normal passive range of motion. Neurological: He is alert and oriented to person, place, and time. He has normal reflexes. No cranial nerve deficit. Skin: Skin is warm and dry. No erythema. Psychiatric: He has a normal mood and affect. His behavior is normal. Thought content normal.       ASSESSMENT/PLAN:  1. Osteoarthritis of right shoulder, unspecified osteoarthritis type  - Likely OA  - Offered injections, patient declining at this time  - naproxen (NAPROXEN) 500 MG EC tablet; Take 1 tablet by mouth 2 times daily (with meals)  Dispense: 60 tablet; Refill: 3  - [facility]      Return in about 6 weeks (around 10/5/2018) for f/u right shoulder pain. An electronic signature was used to authenticate this note.     --Aiyana Miles MD

## 2018-09-05 DIAGNOSIS — I10 ESSENTIAL HYPERTENSION: ICD-10-CM

## 2018-09-05 DIAGNOSIS — Z87.39 HISTORY OF GOUT: ICD-10-CM

## 2018-09-06 DIAGNOSIS — I10 ESSENTIAL HYPERTENSION: ICD-10-CM

## 2018-09-06 DIAGNOSIS — Z87.39 HISTORY OF GOUT: ICD-10-CM

## 2018-09-06 RX ORDER — ALLOPURINOL 100 MG/1
100 TABLET ORAL 2 TIMES DAILY
Qty: 180 TABLET | Refills: 0 | Status: SHIPPED | OUTPATIENT
Start: 2018-09-06 | End: 2019-01-04 | Stop reason: SDUPTHER

## 2018-09-06 RX ORDER — ALLOPURINOL 100 MG/1
100 TABLET ORAL 2 TIMES DAILY
Qty: 180 TABLET | Refills: 0 | OUTPATIENT
Start: 2018-09-06

## 2018-09-06 RX ORDER — HYDROCHLOROTHIAZIDE 12.5 MG/1
12.5 TABLET ORAL DAILY
Qty: 90 TABLET | Refills: 0 | OUTPATIENT
Start: 2018-09-06

## 2018-09-06 RX ORDER — HYDROCHLOROTHIAZIDE 12.5 MG/1
12.5 TABLET ORAL DAILY
Qty: 90 TABLET | Refills: 0 | Status: SHIPPED | OUTPATIENT
Start: 2018-09-06 | End: 2018-12-27 | Stop reason: SDUPTHER

## 2018-09-09 ASSESSMENT — ENCOUNTER SYMPTOMS
SORE THROAT: 0
RHINORRHEA: 0
VOMITING: 0
BACK PAIN: 1
COLOR CHANGE: 0
COUGH: 0
DIARRHEA: 0
SHORTNESS OF BREATH: 0
CONSTIPATION: 0
NAUSEA: 0
WHEEZING: 0

## 2018-09-14 DIAGNOSIS — I10 ESSENTIAL HYPERTENSION: ICD-10-CM

## 2018-09-17 DIAGNOSIS — I10 ESSENTIAL HYPERTENSION: ICD-10-CM

## 2018-09-17 RX ORDER — CLONIDINE HYDROCHLORIDE 0.2 MG/1
0.2 TABLET ORAL 2 TIMES DAILY
Qty: 180 TABLET | Refills: 0 | Status: SHIPPED | OUTPATIENT
Start: 2018-09-17 | End: 2018-11-14 | Stop reason: SDUPTHER

## 2018-09-17 RX ORDER — AMLODIPINE BESYLATE 10 MG/1
10 TABLET ORAL
Qty: 90 TABLET | Refills: 0 | Status: SHIPPED | OUTPATIENT
Start: 2018-09-17 | End: 2018-11-14 | Stop reason: SDUPTHER

## 2018-09-18 RX ORDER — METOPROLOL SUCCINATE 50 MG/1
50 TABLET, EXTENDED RELEASE ORAL EVERY MORNING
Qty: 90 TABLET | Refills: 0 | Status: SHIPPED | OUTPATIENT
Start: 2018-09-18 | End: 2018-11-14 | Stop reason: SDUPTHER

## 2018-09-26 ENCOUNTER — HOSPITAL ENCOUNTER (OUTPATIENT)
Dept: PHYSICAL THERAPY | Age: 68
Setting detail: THERAPIES SERIES
Discharge: HOME | End: 2018-09-26
Payer: MEDICARE

## 2018-09-26 PROCEDURE — G8984 CARRY CURRENT STATUS: HCPCS

## 2018-09-26 PROCEDURE — 97161 PT EVAL LOW COMPLEX 20 MIN: CPT

## 2018-09-26 PROCEDURE — G8985 CARRY GOAL STATUS: HCPCS

## 2018-09-26 NOTE — PROGRESS NOTES
[facility]                Phone: [phone]   Fax: [phone]    Physical Therapy Daily Treatment Note  Date:  2018    Patient Name:  Lico Ronquillo     :  1950  MRN: [id number]    Referring Physician:  Bg Arenas MD   Insurance Information:  [facility] Medicare       Evaluation date:  18   Diagnosis:  OA R shoulder   Cert Dates:  03- 18   ICD-10 Codes:  K81.824  M25.519   M25. 010   Evaluating Physical Therapist:  Glendy Velázquez DPT  Plan of care signed (Y/N):    Visit# / total visits:         Subjective:        Exercises:   Exercise/Equipment Reps  During/ after  Other comments    Pulleys          Table Slides          Rows   Mid   High       No Money ER          Wall slides             Home Exercise Program:       Comments:       Treatment/Activity Tolerance:  [] Patient tolerated treatment well [] Patient limited by fatique  [] Patient limited by pain  [] Patient limited by other medical complications  [] Other:     Prognosis: [] Good [x] Fair  [] Poor    Patient Requires Follow-up: [x] Yes  [] No    Plan:   [] Continue per plan of care [] Alter current plan (see comments)  [x] Plan of care initiated [] Hold pending MD visit [] Discharge    Plan for Next Session:        See Weekly Progress Note: []  Yes  [x]  No  Next due:        Time In:   Time Out:     Electronically signed by:    Glendy Velázquez DPT  [id number]

## 2018-09-26 NOTE — PROGRESS NOTES
[facility]                Phone: [phone]   Fax: [phone]    Physical Therapy Initial Evaluation  Date:  2018    Patient Name:  Marita Ivey. :  1950  MRN: [id number]  Referring Physician:  Karen Jasmine MD   Insurance Information:  [facility] Medicare      Evaluation date:  18   Diagnosis:  OA R shoulder   Cert Dates:  - 18   ICD-10 Codes:  O32.025  M25.519   M25. 353   Evaluating Physical Therapist:  Nadira Arenas DPT      Subjective:  History/Onset of Symptoms:  Pt reports that he has been having increased pain in R shoulder for approximately 1 month with no specific injury/ cause. He reports that pain is worse when trying to sleep at night and when driving with his RUE on top of the steering wheel in IR. Pt is R hand dominant. Pain:  5/10 Pt reports that pain level varies based on activity. Sharp at times with aching all the time. Pt reports popping and crepitus with AROM R shoulder. Sensation:  Pt denies numbness and tingling to RUE. He also denies cervical pain and radiating pain down RUE. Previous methods of Treatment:  None          Objective:    R elbow and wrist are WFL in regards to AROM and strength is 5/5     R shoulder   AROM:  Is WFL except for ER limited to top of head and painful. Mild to moderate pain with IR as well. PROM:  Is grossly WFL      R shoulder strength:   Flexion:  4/5    Abduction 4/5    IR/ ER:  5/5  And 4/5 respectively with pain during resisted ER     No pain with R biceps testing     Special tests:  Decreased pain with impingement relief test  No specific painful arc present   No TTP over R shoulder and AC joint   (+) empty and full can tests for pain (no difference in pain)      Summary/Assessment:    Pt presents to outpatient physical therapy with pain located in R shoulder with associated weakness and decreased ROM.     Plan:     Below checked are areas for improvement during physical therapy POC:        [x]  Pain reduction  []  Balance Improvement       [x]  Strengthening  [x]  Postural Improvement   [x]  Range of Motion  [x]  Soft Tissue Improvement    []  Gait Training   []  Other:      [x]  Home Exercise Program      Pt will be see for 2 visits per week for 4 weeks for a total of 8 visits to accomplish goals set below:        Short Term/ Long Term Goals: (4 weeks)      1. Pt will report less than 2/10 pain in R shoulder with daily activities     2. Pt will increase R shoulder strength to at least 4+/5 throughout     3. Pt will increase R shoulder AROM to [facility] in all planes     4. Pt will be independent with HEP. Pt's potential for reaching Physical Therapy goals: fair . Time In:  1000  Time Out:  [address] [place], [place] [id number]    Bhupinder Kaufman  T: [phone]   F: [phone]     If you have any questions or concerns, please don't hesitate to call. Thank you for your referral.    Physician Signature:________________________________Date:__________________  By signing above, therapists plan is approved by physician. All patients under [employer]   must be signed by physician.

## 2018-10-02 ENCOUNTER — NURSE ONLY (OUTPATIENT)
Dept: FAMILY MEDICINE CLINIC | Age: 68
End: 2018-10-02
Payer: MEDICARE

## 2018-10-02 DIAGNOSIS — Z23 NEED FOR INFLUENZA VACCINATION: Primary | ICD-10-CM

## 2018-10-02 PROCEDURE — 99211 OFF/OP EST MAY X REQ PHY/QHP: CPT | Performed by: COUNSELOR

## 2018-10-02 PROCEDURE — 6360000002 HC RX W HCPCS

## 2018-10-02 PROCEDURE — 90686 IIV4 VACC NO PRSV 0.5 ML IM: CPT

## 2018-10-02 PROCEDURE — G0008 ADMIN INFLUENZA VIRUS VAC: HCPCS

## 2018-10-18 DIAGNOSIS — I10 ESSENTIAL HYPERTENSION: ICD-10-CM

## 2018-10-19 RX ORDER — LOSARTAN POTASSIUM 100 MG/1
100 TABLET ORAL EVERY MORNING
Qty: 90 TABLET | Refills: 0 | Status: SHIPPED | OUTPATIENT
Start: 2018-10-19 | End: 2018-11-14 | Stop reason: SDUPTHER

## 2018-10-19 RX ORDER — ATORVASTATIN CALCIUM 10 MG/1
TABLET, FILM COATED ORAL
Qty: 90 TABLET | Refills: 0 | Status: SHIPPED | OUTPATIENT
Start: 2018-10-19 | End: 2018-11-14 | Stop reason: SDUPTHER

## 2018-11-14 ENCOUNTER — OFFICE VISIT (OUTPATIENT)
Dept: FAMILY MEDICINE CLINIC | Age: 68
End: 2018-11-14
Payer: MEDICARE

## 2018-11-14 ENCOUNTER — HOSPITAL ENCOUNTER (OUTPATIENT)
Age: 68
Discharge: HOME | End: 2018-11-16
Payer: MEDICARE

## 2018-11-14 VITALS
TEMPERATURE: 97.9 F | BODY MASS INDEX: 28.17 KG/M2 | HEIGHT: 72 IN | OXYGEN SATURATION: 96 % | HEART RATE: 60 BPM | WEIGHT: 208 LBS | DIASTOLIC BLOOD PRESSURE: 70 MMHG | RESPIRATION RATE: 18 BRPM | SYSTOLIC BLOOD PRESSURE: 113 MMHG

## 2018-11-14 DIAGNOSIS — Z72.0 TOBACCO ABUSE: ICD-10-CM

## 2018-11-14 DIAGNOSIS — I10 ESSENTIAL HYPERTENSION: ICD-10-CM

## 2018-11-14 DIAGNOSIS — Z20.6 CONTACT WITH AND (SUSPECTED) EXPOSURE TO HUMAN IMMUNODEFICIENCY VIRUS (HIV): ICD-10-CM

## 2018-11-14 DIAGNOSIS — Z20.2 POSSIBLE EXPOSURE TO STD: Primary | ICD-10-CM

## 2018-11-14 DIAGNOSIS — Z20.2 POSSIBLE EXPOSURE TO STD: ICD-10-CM

## 2018-11-14 PROCEDURE — G8482 FLU IMMUNIZE ORDER/ADMIN: HCPCS | Performed by: STUDENT IN AN ORGANIZED HEALTH CARE EDUCATION/TRAINING PROGRAM

## 2018-11-14 PROCEDURE — 36415 COLL VENOUS BLD VENIPUNCTURE: CPT | Performed by: FAMILY MEDICINE

## 2018-11-14 PROCEDURE — 1101F PT FALLS ASSESS-DOCD LE1/YR: CPT | Performed by: STUDENT IN AN ORGANIZED HEALTH CARE EDUCATION/TRAINING PROGRAM

## 2018-11-14 PROCEDURE — 4040F PNEUMOC VAC/ADMIN/RCVD: CPT | Performed by: STUDENT IN AN ORGANIZED HEALTH CARE EDUCATION/TRAINING PROGRAM

## 2018-11-14 PROCEDURE — G8417 CALC BMI ABV UP PARAM F/U: HCPCS | Performed by: STUDENT IN AN ORGANIZED HEALTH CARE EDUCATION/TRAINING PROGRAM

## 2018-11-14 PROCEDURE — 4004F PT TOBACCO SCREEN RCVD TLK: CPT | Performed by: STUDENT IN AN ORGANIZED HEALTH CARE EDUCATION/TRAINING PROGRAM

## 2018-11-14 PROCEDURE — 86592 SYPHILIS TEST NON-TREP QUAL: CPT

## 2018-11-14 PROCEDURE — 87591 N.GONORRHOEAE DNA AMP PROB: CPT

## 2018-11-14 PROCEDURE — 1123F ACP DISCUSS/DSCN MKR DOCD: CPT | Performed by: STUDENT IN AN ORGANIZED HEALTH CARE EDUCATION/TRAINING PROGRAM

## 2018-11-14 PROCEDURE — 3017F COLORECTAL CA SCREEN DOC REV: CPT | Performed by: STUDENT IN AN ORGANIZED HEALTH CARE EDUCATION/TRAINING PROGRAM

## 2018-11-14 PROCEDURE — 86703 HIV-1/HIV-2 1 RESULT ANTBDY: CPT

## 2018-11-14 PROCEDURE — G8427 DOCREV CUR MEDS BY ELIG CLIN: HCPCS | Performed by: STUDENT IN AN ORGANIZED HEALTH CARE EDUCATION/TRAINING PROGRAM

## 2018-11-14 PROCEDURE — 99212 OFFICE O/P EST SF 10 MIN: CPT | Performed by: STUDENT IN AN ORGANIZED HEALTH CARE EDUCATION/TRAINING PROGRAM

## 2018-11-14 PROCEDURE — G8598 ASA/ANTIPLAT THER USED: HCPCS | Performed by: STUDENT IN AN ORGANIZED HEALTH CARE EDUCATION/TRAINING PROGRAM

## 2018-11-14 PROCEDURE — 99213 OFFICE O/P EST LOW 20 MIN: CPT | Performed by: STUDENT IN AN ORGANIZED HEALTH CARE EDUCATION/TRAINING PROGRAM

## 2018-11-14 PROCEDURE — 87491 CHLMYD TRACH DNA AMP PROBE: CPT

## 2018-11-14 RX ORDER — LOSARTAN POTASSIUM 100 MG/1
100 TABLET ORAL EVERY MORNING
Qty: 90 TABLET | Refills: 1 | Status: SHIPPED | OUTPATIENT
Start: 2018-11-14 | End: 2019-03-21 | Stop reason: SDUPTHER

## 2018-11-14 RX ORDER — METOPROLOL SUCCINATE 50 MG/1
50 TABLET, EXTENDED RELEASE ORAL EVERY MORNING
Qty: 90 TABLET | Refills: 0 | Status: SHIPPED | OUTPATIENT
Start: 2018-11-14 | End: 2019-04-22 | Stop reason: SDUPTHER

## 2018-11-14 RX ORDER — CLONIDINE HYDROCHLORIDE 0.2 MG/1
0.2 TABLET ORAL 2 TIMES DAILY
Qty: 180 TABLET | Refills: 0 | Status: SHIPPED | OUTPATIENT
Start: 2018-11-14 | End: 2019-04-16 | Stop reason: SDUPTHER

## 2018-11-14 RX ORDER — AMLODIPINE BESYLATE 10 MG/1
10 TABLET ORAL
Qty: 90 TABLET | Refills: 0 | Status: SHIPPED | OUTPATIENT
Start: 2018-11-14 | End: 2019-03-21 | Stop reason: SDUPTHER

## 2018-11-14 RX ORDER — ATORVASTATIN CALCIUM 10 MG/1
TABLET, FILM COATED ORAL
Qty: 90 TABLET | Refills: 3 | Status: SHIPPED | OUTPATIENT
Start: 2018-11-14 | End: 2019-02-08 | Stop reason: SDUPTHER

## 2018-11-14 NOTE — PROGRESS NOTES
1 tablet by mouth every morning (before breakfast)  Dispense: 90 tablet; Refill: 0    2. Possible exposure to STD  - C. Trachomatis / N. Gonorrhoeae, DNA Probe; Future  - RPR; Future  - HIV-1 AND HIV-2 ANTIBODIES; Future    3. Contact with and (suspected) exposure to human immunodeficiency virus (hiv)   - HIV-1 AND HIV-2 ANTIBODIES; Future    4. Tobacco abuse  - will try and quit on his own  - suggested tobacco cesstion program      Return in about 3 months (around 2/14/2019) for f/u copd and tobacco cessation. An electronic signature was used to authenticate this note.     --Juan C King MD on 11/14/2018 at 1:01 PM

## 2018-11-15 LAB
HIV-1 AND HIV-2 ANTIBODIES: NORMAL
RPR: NORMAL

## 2018-11-19 ASSESSMENT — ENCOUNTER SYMPTOMS
SHORTNESS OF BREATH: 0
CONSTIPATION: 0
BLOOD IN STOOL: 0
ABDOMINAL PAIN: 0
RHINORRHEA: 0
WHEEZING: 0
EYE ITCHING: 0
VOMITING: 0
COLOR CHANGE: 0
NAUSEA: 0
DIARRHEA: 0
CHEST TIGHTNESS: 0
COUGH: 0
EYE PAIN: 0
ABDOMINAL DISTENTION: 0
BACK PAIN: 0

## 2018-11-20 ENCOUNTER — TELEPHONE (OUTPATIENT)
Dept: FAMILY MEDICINE CLINIC | Age: 68
End: 2018-11-20

## 2018-11-21 DIAGNOSIS — K21.9 GASTROESOPHAGEAL REFLUX DISEASE, ESOPHAGITIS PRESENCE NOT SPECIFIED: ICD-10-CM

## 2018-11-23 LAB
CHLAMYDIA TRACHOMATIS AMPLIFIED DET: NORMAL
N GONORRHOEAE AMPLIFIED DET: NORMAL

## 2018-11-23 RX ORDER — PANTOPRAZOLE SODIUM 40 MG/1
40 TABLET, DELAYED RELEASE ORAL DAILY
Qty: 90 TABLET | Refills: 0 | Status: SHIPPED | OUTPATIENT
Start: 2018-11-23 | End: 2019-02-08 | Stop reason: SDUPTHER

## 2018-12-03 ENCOUNTER — HOSPITAL ENCOUNTER (OUTPATIENT)
Dept: PHYSICAL THERAPY | Age: 68
Setting detail: THERAPIES SERIES
Discharge: HOME | End: 2018-12-03
Payer: MEDICARE

## 2018-12-03 PROCEDURE — G8986 CARRY D/C STATUS: HCPCS

## 2018-12-03 PROCEDURE — G8985 CARRY GOAL STATUS: HCPCS

## 2018-12-03 NOTE — PROGRESS NOTES
[facility]                Phone: [phone]   Fax: [phone]      Physical Therapy  Non compliance/Attendance Issue    DATE:   12/3/2018            MRN: [id number]     PATIENT NAME:  Bree Mantilla. Diagnosis:  OA R shoulder     Total Visits to Date: 1 eval only  Cancels/No shows to Date: 1    Dear Dr. Niall Noble MD     This is to notify you that per our physical therapy department policy your patient, noted above, is being discharged from Physical Therapy due to the following reason(s):       · Pt's last contact with PT department was on eval which was 9/26/18. If you have any questions, please feel free to contact me at [phone].     Thank You,    Electronically Signed by:   Shikha Sadler DPT        [id number]  12/3/2018

## 2018-12-17 DIAGNOSIS — K21.9 GASTROESOPHAGEAL REFLUX DISEASE, ESOPHAGITIS PRESENCE NOT SPECIFIED: ICD-10-CM

## 2018-12-18 RX ORDER — FAMOTIDINE 20 MG/1
20 TABLET, FILM COATED ORAL 2 TIMES DAILY PRN
Qty: 60 TABLET | Refills: 3 | Status: SHIPPED | OUTPATIENT
Start: 2018-12-18 | End: 2019-03-21 | Stop reason: SDUPTHER

## 2018-12-21 ENCOUNTER — OFFICE VISIT (OUTPATIENT)
Dept: CARDIOLOGY CLINIC | Age: 68
End: 2018-12-21
Payer: MEDICARE

## 2018-12-21 VITALS
DIASTOLIC BLOOD PRESSURE: 86 MMHG | WEIGHT: 208 LBS | RESPIRATION RATE: 16 BRPM | HEIGHT: 72 IN | HEART RATE: 60 BPM | BODY MASS INDEX: 28.17 KG/M2 | SYSTOLIC BLOOD PRESSURE: 148 MMHG

## 2018-12-21 DIAGNOSIS — K21.00 HIATAL HERNIA WITH GASTROESOPHAGEAL REFLUX DISEASE AND ESOPHAGITIS: ICD-10-CM

## 2018-12-21 DIAGNOSIS — Z98.61 HISTORY OF PTCA: ICD-10-CM

## 2018-12-21 DIAGNOSIS — I51.9 LV DYSFUNCTION: ICD-10-CM

## 2018-12-21 DIAGNOSIS — E78.5 DYSLIPIDEMIA: ICD-10-CM

## 2018-12-21 DIAGNOSIS — C09.9 PRIMARY TONSILLAR SQUAMOUS CELL CARCINOMA (HCC): ICD-10-CM

## 2018-12-21 DIAGNOSIS — N52.9 ERECTILE DYSFUNCTION, UNSPECIFIED ERECTILE DYSFUNCTION TYPE: ICD-10-CM

## 2018-12-21 DIAGNOSIS — I10 ESSENTIAL HYPERTENSION: ICD-10-CM

## 2018-12-21 DIAGNOSIS — I25.10 CORONARY ARTERY DISEASE INVOLVING NATIVE CORONARY ARTERY OF NATIVE HEART WITHOUT ANGINA PECTORIS: Primary | ICD-10-CM

## 2018-12-21 DIAGNOSIS — M47.816 SPONDYLOSIS OF LUMBAR REGION WITHOUT MYELOPATHY OR RADICULOPATHY: ICD-10-CM

## 2018-12-21 DIAGNOSIS — K44.9 HIATAL HERNIA WITH GASTROESOPHAGEAL REFLUX DISEASE AND ESOPHAGITIS: ICD-10-CM

## 2018-12-21 DIAGNOSIS — Z72.0 TOBACCO ABUSE: ICD-10-CM

## 2018-12-21 PROCEDURE — G8598 ASA/ANTIPLAT THER USED: HCPCS | Performed by: INTERNAL MEDICINE

## 2018-12-21 PROCEDURE — G8417 CALC BMI ABV UP PARAM F/U: HCPCS | Performed by: INTERNAL MEDICINE

## 2018-12-21 PROCEDURE — G8427 DOCREV CUR MEDS BY ELIG CLIN: HCPCS | Performed by: INTERNAL MEDICINE

## 2018-12-21 PROCEDURE — 1101F PT FALLS ASSESS-DOCD LE1/YR: CPT | Performed by: INTERNAL MEDICINE

## 2018-12-21 PROCEDURE — 99213 OFFICE O/P EST LOW 20 MIN: CPT | Performed by: INTERNAL MEDICINE

## 2018-12-21 PROCEDURE — 1123F ACP DISCUSS/DSCN MKR DOCD: CPT | Performed by: INTERNAL MEDICINE

## 2018-12-21 PROCEDURE — 4040F PNEUMOC VAC/ADMIN/RCVD: CPT | Performed by: INTERNAL MEDICINE

## 2018-12-21 PROCEDURE — 3017F COLORECTAL CA SCREEN DOC REV: CPT | Performed by: INTERNAL MEDICINE

## 2018-12-21 PROCEDURE — 4004F PT TOBACCO SCREEN RCVD TLK: CPT | Performed by: INTERNAL MEDICINE

## 2018-12-21 PROCEDURE — G8482 FLU IMMUNIZE ORDER/ADMIN: HCPCS | Performed by: INTERNAL MEDICINE

## 2018-12-21 PROCEDURE — 93000 ELECTROCARDIOGRAM COMPLETE: CPT | Performed by: INTERNAL MEDICINE

## 2018-12-21 NOTE — PROGRESS NOTES
MG EC tablet, Take 1 tablet by mouth daily, Disp: 90 tablet, Rfl: 3    PERCOCET 7.5-325 MG per tablet, Take 1 tablet by mouth every 8 hours as needed  . , Disp: , Rfl:     albuterol sulfate HFA (PROVENTIL HFA) 108 (90 BASE) MCG/ACT inhaler, Inhale 2 puffs into the lungs 4 times daily (Patient taking differently: Inhale 2 puffs into the lungs every 6 hours as needed ), Disp: 1 Inhaler, Rfl: 2    sodium chloride (OCEAN) 0.65 % nasal spray, 1 spray by Nasal route as needed for Congestion, Disp: 1 Bottle, Rfl: 0      S: REASON FOR VISIT:    Coronary artery disease/ischemic cardiomyopathy. Rufus King is a pleasant, 53-year-old gentleman with cardiovascular history as described below. He was seen in the office in 05/2018 for cardiac risk stratification prior to hiatal hernia repair surgery, which he has not had done yet. He tries to remain active. He reports that he is on the go all day long, but gets tired at the end of the day. He does have dyspnea with moderate activity. He has occasional sharp, fleeting, subcostal, left-sided chest pains. He otherwise denies any exertional chest pain with his daily activities. He also denies any significant dyspnea with his daily activities. He denies orthopnea, PND's or lower extremity swelling. He denies palpitations, dizziness, presyncope or syncope. He continues to smoke 1 pack of cigarettes or less a day. He is going to join smoking cessation classes. He had a membership to the [facility] and is planning to start going there for exercise. He does suffer from hiatal hernia/GERD symptoms. REVIEW OF SYSTEMS:    CONSTITUTIONAL:  Denies fevers, chills, night sweats or fatigue. HEENT:  Denies any unusual headaches. Denies any recent changes in hearing or vision. Denies dysphagia, hoarseness, hemoptysis, hematemesis or any recent epistaxis. ENDOCRINE:  Denies polyphagia, polydipsia or polyuria. Denies heat or cold intolerance.   MUSCULOSKELETAL:  Denies any significant arthralgias or myalgias. SKIN:  Denies rashes, ulcers or itching. HEME/LYMPH:  Denies any palpable lymph nodes, bleeding or easy bruisability. HEART:  Denies chest discomfort or palpitations. Lower extremity edema as described above. LUNGS:  Denies cough or sputum production. Exertional dyspnea as described above. Denies PND or cough. GI: He has acid reflux symptoms, but denies other types of abdominal pain. He denies nausea, vomiting, diarrhea, constipation, rectal bleeding or tarry stools. :  Denies hematuria or dysuria. PSYCHIATRIC:  Denies mood changes, anxiety or depression. NEUROLOGIC:  Denies dizziness, syncope, numbness or tingling.        CARDIOVASCULAR HISTORY:    1. Hypertension. 2. Tobacco abuse. 3. Coronary artery disease:   a. Treadmill nuclear stress test done on 11/25/12: Michael protocol. 6 minutes. 88% of the maximum-predicted heart rate. Physiologic blood pressure response. No chest pain. No ischemic EKG changes. The nuclear images were read, however, as showing mild reversible defect involving the inferior wall with mild hypokinesis of the inferior wall and ejection fraction of 43%. b. 11/27/12, Cardiac catheterization/angioplasty: Left main no significant disease. LAD 50% midvessel narrowing. Intermediate ramus 30% ostial vessel narrowing. LCX large codominant vessel with 80% stenosis of a large first marginal branch and 70% eccentric stenosis of the distal left circumflex. RCA is small-to-moderate size codominant vessel with no significant disease. Mildly dilated left ventricle with no regional wall motion abnormality with an estimated ejection fraction of 55% with post PVC mitral regurgitation. Systemic hypertension.  Elevated left ventricular end-diastolic pressure suggestive of diastolic dysfunction.   -Successful primary stenting with a drug-eluting coronary stent to the obtuse marginal branch of the left circumflex with excellent results.   -Successful primary

## 2018-12-27 DIAGNOSIS — I10 ESSENTIAL HYPERTENSION: ICD-10-CM

## 2018-12-27 RX ORDER — ASPIRIN 81 MG/1
81 TABLET ORAL DAILY
Qty: 90 TABLET | Refills: 3 | Status: SHIPPED | OUTPATIENT
Start: 2018-12-27

## 2018-12-27 RX ORDER — HYDROCHLOROTHIAZIDE 12.5 MG/1
12.5 TABLET ORAL DAILY
Qty: 90 TABLET | Refills: 0 | Status: SHIPPED | OUTPATIENT
Start: 2018-12-27 | End: 2019-03-21 | Stop reason: SDUPTHER

## 2019-01-02 ENCOUNTER — OFFICE VISIT (OUTPATIENT)
Dept: FAMILY MEDICINE CLINIC | Age: 69
End: 2019-01-02
Payer: MEDICARE

## 2019-01-02 VITALS
RESPIRATION RATE: 16 BRPM | OXYGEN SATURATION: 99 % | SYSTOLIC BLOOD PRESSURE: 138 MMHG | DIASTOLIC BLOOD PRESSURE: 80 MMHG | WEIGHT: 214 LBS | BODY MASS INDEX: 28.99 KG/M2 | HEIGHT: 72 IN | TEMPERATURE: 99 F

## 2019-01-02 DIAGNOSIS — J40 BRONCHITIS: ICD-10-CM

## 2019-01-02 DIAGNOSIS — J06.9 VIRAL URI: Primary | ICD-10-CM

## 2019-01-02 PROCEDURE — G8598 ASA/ANTIPLAT THER USED: HCPCS | Performed by: STUDENT IN AN ORGANIZED HEALTH CARE EDUCATION/TRAINING PROGRAM

## 2019-01-02 PROCEDURE — 3017F COLORECTAL CA SCREEN DOC REV: CPT | Performed by: STUDENT IN AN ORGANIZED HEALTH CARE EDUCATION/TRAINING PROGRAM

## 2019-01-02 PROCEDURE — 1123F ACP DISCUSS/DSCN MKR DOCD: CPT | Performed by: STUDENT IN AN ORGANIZED HEALTH CARE EDUCATION/TRAINING PROGRAM

## 2019-01-02 PROCEDURE — 99213 OFFICE O/P EST LOW 20 MIN: CPT | Performed by: STUDENT IN AN ORGANIZED HEALTH CARE EDUCATION/TRAINING PROGRAM

## 2019-01-02 PROCEDURE — G8427 DOCREV CUR MEDS BY ELIG CLIN: HCPCS | Performed by: STUDENT IN AN ORGANIZED HEALTH CARE EDUCATION/TRAINING PROGRAM

## 2019-01-02 PROCEDURE — G8417 CALC BMI ABV UP PARAM F/U: HCPCS | Performed by: STUDENT IN AN ORGANIZED HEALTH CARE EDUCATION/TRAINING PROGRAM

## 2019-01-02 PROCEDURE — 4040F PNEUMOC VAC/ADMIN/RCVD: CPT | Performed by: STUDENT IN AN ORGANIZED HEALTH CARE EDUCATION/TRAINING PROGRAM

## 2019-01-02 PROCEDURE — 99212 OFFICE O/P EST SF 10 MIN: CPT | Performed by: STUDENT IN AN ORGANIZED HEALTH CARE EDUCATION/TRAINING PROGRAM

## 2019-01-02 PROCEDURE — G8482 FLU IMMUNIZE ORDER/ADMIN: HCPCS | Performed by: STUDENT IN AN ORGANIZED HEALTH CARE EDUCATION/TRAINING PROGRAM

## 2019-01-02 PROCEDURE — 4004F PT TOBACCO SCREEN RCVD TLK: CPT | Performed by: STUDENT IN AN ORGANIZED HEALTH CARE EDUCATION/TRAINING PROGRAM

## 2019-01-02 PROCEDURE — 1101F PT FALLS ASSESS-DOCD LE1/YR: CPT | Performed by: STUDENT IN AN ORGANIZED HEALTH CARE EDUCATION/TRAINING PROGRAM

## 2019-01-02 RX ORDER — ALBUTEROL SULFATE 90 UG/1
2 AEROSOL, METERED RESPIRATORY (INHALATION) 4 TIMES DAILY
Qty: 1 INHALER | Refills: 2 | Status: SHIPPED | OUTPATIENT
Start: 2019-01-02 | End: 2019-08-26 | Stop reason: SDUPTHER

## 2019-01-02 RX ORDER — GUAIFENESIN AND DEXTROMETHORPHAN HYDROBROMIDE 1200; 60 MG/1; MG/1
1 TABLET, EXTENDED RELEASE ORAL 2 TIMES DAILY
Qty: 28 TABLET | Refills: 0 | Status: SHIPPED | OUTPATIENT
Start: 2019-01-02 | End: 2019-08-26

## 2019-01-04 DIAGNOSIS — Z87.39 HISTORY OF GOUT: ICD-10-CM

## 2019-01-04 RX ORDER — ALLOPURINOL 100 MG/1
100 TABLET ORAL 2 TIMES DAILY
Qty: 180 TABLET | Refills: 0 | Status: SHIPPED | OUTPATIENT
Start: 2019-01-04 | End: 2019-04-04 | Stop reason: SDUPTHER

## 2019-01-04 ASSESSMENT — ENCOUNTER SYMPTOMS
WHEEZING: 0
VOMITING: 0
RHINORRHEA: 1
SORE THROAT: 0
BLOOD IN STOOL: 0
CONSTIPATION: 0
DIARRHEA: 0
BACK PAIN: 0
SINUS PRESSURE: 0
EYE ITCHING: 0
ABDOMINAL PAIN: 0
NAUSEA: 0
EYE PAIN: 0
COLOR CHANGE: 0
CHEST TIGHTNESS: 0
ABDOMINAL DISTENTION: 0
COUGH: 1
SHORTNESS OF BREATH: 1

## 2019-02-06 ENCOUNTER — OFFICE VISIT (OUTPATIENT)
Dept: SURGERY | Age: 69
End: 2019-02-06
Payer: MEDICARE

## 2019-02-06 VITALS
TEMPERATURE: 98.4 F | OXYGEN SATURATION: 97 % | SYSTOLIC BLOOD PRESSURE: 149 MMHG | BODY MASS INDEX: 29.53 KG/M2 | DIASTOLIC BLOOD PRESSURE: 91 MMHG | HEART RATE: 72 BPM | HEIGHT: 72 IN | WEIGHT: 218 LBS

## 2019-02-06 DIAGNOSIS — K21.00 GERD WITH ESOPHAGITIS: ICD-10-CM

## 2019-02-06 DIAGNOSIS — K44.9 HIATAL HERNIA WITH GASTROESOPHAGEAL REFLUX DISEASE AND ESOPHAGITIS: ICD-10-CM

## 2019-02-06 DIAGNOSIS — K21.00 HIATAL HERNIA WITH GASTROESOPHAGEAL REFLUX DISEASE AND ESOPHAGITIS: ICD-10-CM

## 2019-02-06 PROCEDURE — 3017F COLORECTAL CA SCREEN DOC REV: CPT | Performed by: SURGERY

## 2019-02-06 PROCEDURE — G8417 CALC BMI ABV UP PARAM F/U: HCPCS | Performed by: SURGERY

## 2019-02-06 PROCEDURE — 1101F PT FALLS ASSESS-DOCD LE1/YR: CPT | Performed by: SURGERY

## 2019-02-06 PROCEDURE — 4004F PT TOBACCO SCREEN RCVD TLK: CPT | Performed by: SURGERY

## 2019-02-06 PROCEDURE — 99212 OFFICE O/P EST SF 10 MIN: CPT | Performed by: SURGERY

## 2019-02-06 PROCEDURE — 4040F PNEUMOC VAC/ADMIN/RCVD: CPT | Performed by: SURGERY

## 2019-02-06 PROCEDURE — G8598 ASA/ANTIPLAT THER USED: HCPCS | Performed by: SURGERY

## 2019-02-06 PROCEDURE — G8427 DOCREV CUR MEDS BY ELIG CLIN: HCPCS | Performed by: SURGERY

## 2019-02-06 PROCEDURE — G8482 FLU IMMUNIZE ORDER/ADMIN: HCPCS | Performed by: SURGERY

## 2019-02-06 PROCEDURE — 1123F ACP DISCUSS/DSCN MKR DOCD: CPT | Performed by: SURGERY

## 2019-02-08 DIAGNOSIS — K21.9 GASTROESOPHAGEAL REFLUX DISEASE, ESOPHAGITIS PRESENCE NOT SPECIFIED: ICD-10-CM

## 2019-02-08 DIAGNOSIS — I10 ESSENTIAL HYPERTENSION: ICD-10-CM

## 2019-02-08 RX ORDER — PANTOPRAZOLE SODIUM 40 MG/1
40 TABLET, DELAYED RELEASE ORAL DAILY
Qty: 90 TABLET | Refills: 0 | Status: SHIPPED | OUTPATIENT
Start: 2019-02-08 | End: 2019-05-17 | Stop reason: SDUPTHER

## 2019-02-08 RX ORDER — ATORVASTATIN CALCIUM 10 MG/1
TABLET, FILM COATED ORAL
Qty: 90 TABLET | Refills: 3 | Status: SHIPPED | OUTPATIENT
Start: 2019-02-08 | End: 2019-05-17 | Stop reason: SDUPTHER

## 2019-03-21 DIAGNOSIS — I10 ESSENTIAL HYPERTENSION: ICD-10-CM

## 2019-03-21 DIAGNOSIS — K21.9 GASTROESOPHAGEAL REFLUX DISEASE, ESOPHAGITIS PRESENCE NOT SPECIFIED: ICD-10-CM

## 2019-03-21 RX ORDER — HYDROCHLOROTHIAZIDE 12.5 MG/1
12.5 TABLET ORAL DAILY
Qty: 90 TABLET | Refills: 0 | Status: SHIPPED | OUTPATIENT
Start: 2019-03-21 | End: 2019-05-17 | Stop reason: SDUPTHER

## 2019-03-21 RX ORDER — LOSARTAN POTASSIUM 100 MG/1
100 TABLET ORAL EVERY MORNING
Qty: 90 TABLET | Refills: 1 | Status: SHIPPED | OUTPATIENT
Start: 2019-03-21 | End: 2019-09-17 | Stop reason: SDUPTHER

## 2019-03-21 RX ORDER — FAMOTIDINE 20 MG/1
20 TABLET, FILM COATED ORAL 2 TIMES DAILY PRN
Qty: 60 TABLET | Refills: 3 | Status: SHIPPED | OUTPATIENT
Start: 2019-03-21 | End: 2019-08-26 | Stop reason: ALTCHOICE

## 2019-03-21 RX ORDER — AMLODIPINE BESYLATE 10 MG/1
10 TABLET ORAL
Qty: 90 TABLET | Refills: 0 | Status: SHIPPED | OUTPATIENT
Start: 2019-03-21 | End: 2019-05-17 | Stop reason: SDUPTHER

## 2019-04-02 ENCOUNTER — TELEPHONE (OUTPATIENT)
Dept: SURGERY | Age: 69
End: 2019-04-02

## 2019-04-02 VITALS — HEIGHT: 72 IN | WEIGHT: 220 LBS | BODY MASS INDEX: 29.8 KG/M2

## 2019-04-02 RX ORDER — OXYCODONE AND ACETAMINOPHEN 7.5; 325 MG/1; MG/1
1 TABLET ORAL EVERY 4 HOURS PRN
Status: ON HOLD | COMMUNITY
End: 2019-05-24 | Stop reason: HOSPADM

## 2019-04-02 RX ORDER — NALOXONE HYDROCHLORIDE 4 MG/.1ML
SPRAY NASAL
Refills: 0 | COMMUNITY
Start: 2019-01-03

## 2019-04-02 NOTE — PROGRESS NOTES
[facility] PRE-ADMISSION TESTING GENERAL INSTRUCTIONS- [facility]-phone number:[phone]    GENERAL INSTRUCTIONS  ? Antibacterial Soap shower Night before and/or AM of SurgeryX  ? Kushal wipe instruction sheet and wipes given. ? Nothing by mouth after midnight, including gum, candy, mints, or water. X  ? You may brush your teeth, gargle, but do NOT swallow water. ? Hibiclens shower  the night before and the morning of surgery. Do not use             Hibiclens on your face or head. ? No smoking, chewing tobacco, illegal drugs, or alcohol within 24 hours of your surgery. X  ? Jewelry, valuables or body piercing's should not be brought to the hospital. All body and/or tongue piercing's must be removed prior to arriving to hospital.  ALL hair pins must be removed. X  ? Do not wear makeup, lotions, powders, deodorant. Nail polish as directed by the nurse. X  ? Arrange transportation with a responsible adult  to and from the hospital. If you do not have a responsible adult  to transport you, you will need to make arrangements with a medical transportation company (i.e. [employer]. A Uber/taxi/bus is not appropriate unless you are accompanied by a responsible adult ). Arrange for someone to be with you for the remainder of the day and for 24 hours after your procedure due to having had anesthesia. Who will be your  for transportation? __CHILDREN________________   Who will be staying with you for 24 hrs after your procedure?_______CHILDREN OR GIRLFRIEND___________  ? Bring insurance card and photo ID. ? Transfusion Bracelet: Please bring with you to hospital, day of surgeryX  ? Bring urine specimen day of surgery. Any small container is acceptable. ? Use inhalers the morning of surgery and bring with you to hospital.X  ? Bring copy of living will or healthcare power of  papers to be placed in your electronic record.   ? CPAP/BI-PAP: Please bring your machine if you are to spend the night in the hospital.     PARKING INSTRUCTIONS:   ? Arrival Time:______0530_______  · ? Parking lot '\"I\"  is located on [facility] (the corner of [facility] and [facility]). To enter, press the button and the gate will lift. A free token will be provided to exit the lot. X  One car per patient is allowed to park in this lot. All other cars are to park on [address] [place] [address] either in the parking garage or the handicap lot. ? Free  parking is available on [address] [place] [address]. · ? To reach the [facility] lobby from [address] [place] [address], upon entering the hospital, take elevator B to the 3rd floor. EDUCATION INSTRUCTIONS:      ? Knee or hip replacement booklet & exercise pamphlets given. ? Carlos Norris placed in chart. ? Pre-admission Testing educational folder given  ? Incentive Spirometry,coughing & deep breathing exercises reviewed. X    ? Medication information sheet(s)   ? Fluoroscopy-Xray used in surgery reviewed with patient. Educational pamphlet placed in chart. ?Pain: Post-op pain is normal and to be expected. You will be asked to rate your pain from 0-10(a zero is not acceptable-education is needed). Your post-op pain goal is:  ? Ask your nurse for your pain medication. X  ? Joint camp offered. ? Joint replacement booklets given. ? Other:___________________________    MEDICATION INSTRUCTIONS:   ?Bring a complete list of your medications, please write the last time you took the medicine, give this list to the nurse. ? Take the following medications the morning of surgery with 1-2 ounces of water: SEE MED LIST  ? Stop herbal supplements and vitamins 5 days before your surgery. X  ? DO NOT take any diabetic medicine the morning of surgery. Follow instructions for insulin the day before surgery. ? If you are diabetic and your blood sugar is low or you feel symptomatic, you may drink 1-2 ounces of apple juice or take a glucose tablet.   The morning of your procedure, you may call the pre-op area if you have concerns about your blood sugar [phone].   ? Use your inhalers the morning of surgery. Bring your emergency inhaler with you day of surgery. X   ? Follow physician instructions regarding any blood thinners you may be taking. X    WHAT TO EXPECT:  ? The day of surgery you will be greeted and checked in by the [employer].  In addition, you will be registered in the [facility] by a Patient Access Representative. A nurse will greet you in accordance to the time you are needed in the pre-op area to prepare you for surgery. Please do not be discouraged if you are not greeted in the order you arrive as there are many variables that are involved in patient preparation. Your patience is greatly appreciated as you wait for your nurse. Please bring in items such as: books, magazines, newspapers, electronics, or any other items  to occupy your time in the waiting area. X   ? Delays may occur with surgery and staff will make a sincere effort to keep you informed of delays. If any delays occur with your procedure, we apologize ahead of time for your inconvenience as we recognize the value of your time. X

## 2019-04-02 NOTE — PROGRESS NOTES
LEFT MESSAGE AT DR LUCIA'S OFFICE ON [facility] [employer] REGARDING NEED TO CALL PATIENT FOR INSTRUCTIONS REGARDING ASPIRIN FOR OR WITH DR LUCIA ON 4/12/19

## 2019-04-02 NOTE — TELEPHONE ENCOUNTER
MA received voicemail from Neleam Cruz at [facility] who states the patient was not given instructions on his ASA. Should patient hold ASA prior to [address] [place] [address] on 04/12/2019? Forwarding message to Fiona Morejon and Dr. Sylvester Nye for further advisement.   Electronically signed by Sonia Ny on 4/2/19 at 3:46 PM

## 2019-04-04 DIAGNOSIS — Z87.39 HISTORY OF GOUT: ICD-10-CM

## 2019-04-04 RX ORDER — ALLOPURINOL 100 MG/1
100 TABLET ORAL 2 TIMES DAILY
Qty: 180 TABLET | Refills: 0 | Status: SHIPPED | OUTPATIENT
Start: 2019-04-04 | End: 2019-07-09 | Stop reason: SDUPTHER

## 2019-04-08 ENCOUNTER — HOSPITAL ENCOUNTER (OUTPATIENT)
Dept: PREADMISSION TESTING | Age: 69
Discharge: HOME | End: 2019-04-08

## 2019-04-08 ENCOUNTER — TELEPHONE (OUTPATIENT)
Dept: SURGERY | Age: 69
End: 2019-04-08

## 2019-04-08 DIAGNOSIS — Z01.812 PRE-OPERATIVE LABORATORY EXAMINATION: Primary | ICD-10-CM

## 2019-04-08 NOTE — TELEPHONE ENCOUNTER
MA received call from patient stating he is sick and needs to reschedule his surgery that he has scheduled for 04/12/19. MA advised patient she would forward message to Keith Roderick RENTERIA for further assistant.       Electronically signed by Layla Valera MA on 4/8/19 at 8:15 AM

## 2019-04-09 NOTE — TELEPHONE ENCOUNTER
MA called pt and rescheduled his surgery for 5/24/19 at 7:30 am with an arrival time of 5:30 am and post op appointment on 5/29/19 at 1:15 pm. Pt accepted new times/dates for appointments and MA mailed updated letter to pt.   Electronically signed by Senait Park on 4/9/19 at 10:10 AM

## 2019-04-16 DIAGNOSIS — I10 ESSENTIAL HYPERTENSION: ICD-10-CM

## 2019-04-16 RX ORDER — CLONIDINE HYDROCHLORIDE 0.2 MG/1
0.2 TABLET ORAL 2 TIMES DAILY
Qty: 180 TABLET | Refills: 0 | Status: SHIPPED | OUTPATIENT
Start: 2019-04-16 | End: 2019-07-19 | Stop reason: SDUPTHER

## 2019-04-19 DIAGNOSIS — I10 ESSENTIAL HYPERTENSION: ICD-10-CM

## 2019-04-22 RX ORDER — METOPROLOL SUCCINATE 50 MG/1
50 TABLET, EXTENDED RELEASE ORAL EVERY MORNING
Qty: 90 TABLET | Refills: 0 | Status: SHIPPED | OUTPATIENT
Start: 2019-04-22 | End: 2019-07-24 | Stop reason: SDUPTHER

## 2019-05-13 ENCOUNTER — TELEPHONE (OUTPATIENT)
Dept: SURGERY | Age: 69
End: 2019-05-13

## 2019-05-17 ENCOUNTER — ANESTHESIA EVENT (OUTPATIENT)
Dept: OPERATING ROOM | Age: 69
DRG: 328 | End: 2019-05-17
Payer: MEDICARE

## 2019-05-17 ENCOUNTER — HOSPITAL ENCOUNTER (OUTPATIENT)
Dept: PREADMISSION TESTING | Age: 69
Discharge: HOME | End: 2019-05-17
Payer: MEDICARE

## 2019-05-17 VITALS
HEIGHT: 72 IN | OXYGEN SATURATION: 99 % | SYSTOLIC BLOOD PRESSURE: 126 MMHG | TEMPERATURE: 97.6 F | BODY MASS INDEX: 29.39 KG/M2 | WEIGHT: 217 LBS | DIASTOLIC BLOOD PRESSURE: 76 MMHG | RESPIRATION RATE: 18 BRPM | HEART RATE: 60 BPM

## 2019-05-17 DIAGNOSIS — Z01.812 PRE-OPERATIVE LABORATORY EXAMINATION: Primary | ICD-10-CM

## 2019-05-17 DIAGNOSIS — K21.9 GASTROESOPHAGEAL REFLUX DISEASE, ESOPHAGITIS PRESENCE NOT SPECIFIED: ICD-10-CM

## 2019-05-17 DIAGNOSIS — I10 ESSENTIAL HYPERTENSION: ICD-10-CM

## 2019-05-17 LAB
ANION GAP SERPL CALCULATED.3IONS-SCNC: 9 MMOL/L (ref 7–16)
BUN BLDV-MCNC: 17 MG/DL (ref 8–23)
CALCIUM SERPL-MCNC: 9.3 MG/DL (ref 8.6–10.2)
CHLORIDE BLD-SCNC: 105 MMOL/L (ref 98–107)
CO2: 30 MMOL/L (ref 22–29)
CREAT SERPL-MCNC: 1.4 MG/DL (ref 0.7–1.2)
GFR AFRICAN AMERICAN: >60
GFR NON-AFRICAN AMERICAN: >60 ML/MIN/1.73
GLUCOSE BLD-MCNC: 111 MG/DL (ref 74–99)
HCT VFR BLD CALC: 40 % (ref 37–54)
HEMOGLOBIN: 13.4 G/DL (ref 12.5–16.5)
MCH RBC QN AUTO: 30.7 PG (ref 26–35)
MCHC RBC AUTO-ENTMCNC: 33.5 % (ref 32–34.5)
MCV RBC AUTO: 91.5 FL (ref 80–99.9)
PDW BLD-RTO: 13.7 FL (ref 11.5–15)
PLATELET # BLD: 283 E9/L (ref 130–450)
PMV BLD AUTO: 9.8 FL (ref 7–12)
POTASSIUM SERPL-SCNC: 3.8 MMOL/L (ref 3.5–5)
RBC # BLD: 4.37 E12/L (ref 3.8–5.8)
SODIUM BLD-SCNC: 144 MMOL/L (ref 132–146)
WBC # BLD: 7.8 E9/L (ref 4.5–11.5)

## 2019-05-17 PROCEDURE — 85027 COMPLETE CBC AUTOMATED: CPT

## 2019-05-17 PROCEDURE — 36415 COLL VENOUS BLD VENIPUNCTURE: CPT

## 2019-05-17 PROCEDURE — 80048 BASIC METABOLIC PNL TOTAL CA: CPT

## 2019-05-17 RX ORDER — PANTOPRAZOLE SODIUM 40 MG/1
40 TABLET, DELAYED RELEASE ORAL DAILY
Qty: 90 TABLET | Refills: 0 | Status: SHIPPED | OUTPATIENT
Start: 2019-05-17 | End: 2019-08-26 | Stop reason: SDUPTHER

## 2019-05-17 RX ORDER — ATORVASTATIN CALCIUM 10 MG/1
TABLET, FILM COATED ORAL
Qty: 90 TABLET | Refills: 3 | Status: SHIPPED | OUTPATIENT
Start: 2019-05-17 | End: 2019-10-10 | Stop reason: SDUPTHER

## 2019-05-17 RX ORDER — AMLODIPINE BESYLATE 10 MG/1
10 TABLET ORAL
Qty: 90 TABLET | Refills: 0 | Status: SHIPPED | OUTPATIENT
Start: 2019-05-17 | End: 2019-10-10 | Stop reason: SDUPTHER

## 2019-05-17 RX ORDER — HYDROCHLOROTHIAZIDE 12.5 MG/1
12.5 TABLET ORAL DAILY
Qty: 90 TABLET | Refills: 0 | Status: SHIPPED | OUTPATIENT
Start: 2019-05-17 | End: 2019-10-10 | Stop reason: SDUPTHER

## 2019-05-17 ASSESSMENT — PAIN DESCRIPTION - LOCATION: LOCATION: BACK

## 2019-05-17 ASSESSMENT — PAIN DESCRIPTION - FREQUENCY: FREQUENCY: CONTINUOUS

## 2019-05-17 ASSESSMENT — PAIN DESCRIPTION - DESCRIPTORS: DESCRIPTORS: ACHING;NAGGING

## 2019-05-17 ASSESSMENT — LIFESTYLE VARIABLES: SMOKING_STATUS: 1

## 2019-05-17 ASSESSMENT — PAIN DESCRIPTION - PAIN TYPE: TYPE: CHRONIC PAIN

## 2019-05-17 ASSESSMENT — PAIN DESCRIPTION - ORIENTATION: ORIENTATION: LOWER

## 2019-05-17 NOTE — ANESTHESIA PRE PROCEDURE
Department of Anesthesiology  Preprocedure Note       Name:  Marylen Mutton. Age:  79 y.o.  :  1950                                          MRN:  [id number]         Date:  2019      Surgeon: Veronica Marquez):  Devin Hunter MD    Procedure: LAPAROSCOPIC NISSEN FUNDOPLICATION (N/A )    Medications prior to admission:   Prior to Admission medications    Medication Sig Start Date End Date Taking? Authorizing Provider   metoprolol succinate (TOPROL XL) 50 MG extended release tablet Take 1 tablet by mouth every morning 19   Yumiok Lew MD   cloNIDine (CATAPRES) 0.2 MG tablet Take 1 tablet by mouth 2 times daily 19   Yumiko Lew MD   allopurinol (ZYLOPRIM) 100 MG tablet Take 1 tablet by mouth 2 times daily 19   Yumiko Lew MD   oxyCODONE-acetaminophen (PERCOCET) 7.5-325 MG per tablet Take 1 tablet by mouth every 4 hours as needed for Pain. Historical Provider, MD   NARCAN 4 MG/0.1ML LIQD nasal spray instill 1 spray in 1 NOSTRIL if needed for opioid overdose may re. ..  (REFER TO PRESCRIPTION NOTES).  1/3/19   Historical Provider, MD   losartan (COZAAR) 100 MG tablet Take 1 tablet by mouth every morning 3/21/19   Yumiko Lew MD   famotidine (PEPCID) 20 MG tablet Take 1 tablet by mouth 2 times daily as needed (acid reflux) 3/21/19   Yumiko Lew MD   hydrochlorothiazide (HYDRODIURIL) 12.5 MG tablet Take 1 tablet by mouth daily 3/21/19   Yumiko Lew MD   amLODIPine (NORVASC) 10 MG tablet Take 1 tablet by mouth every morning (before breakfast) 3/21/19   Yumiko Lew MD   pantoprazole (PROTONIX) 40 MG tablet Take 1 tablet by mouth daily 19   Yumiko Lew MD   atorvastatin (LIPITOR) 10 MG tablet take 1 tablet by mouth at bedtime 19   Yumiko Lew MD   albuterol sulfate HFA (PROVENTIL HFA) 108 (90 Base) MCG/ACT inhaler Inhale 2 puffs into the lungs 4 times daily 19   Yumiko Lew MD   Dextromethorphan-Guaifenesin [facility] DM MAXIMUM STRENGTH)  MG TB12 Take 1 tablet by mouth 2 times daily 1/2/19   Poli Saldana MD   aspirin 81 MG EC tablet Take 1 tablet by mouth daily 12/27/18   Poli Saldana MD   naproxen (NAPROXEN) 500 MG EC tablet Take 1 tablet by mouth 2 times daily (with meals) 8/24/18   Poli Saldana MD   sildenafil (VIAGRA) 100 MG tablet Take 1 tablet by mouth as needed for Erectile Dysfunction 7/31/18   Joleen Lara MD   sodium chloride (OCEAN) 0.65 % nasal spray 1 spray by Nasal route as needed for Congestion 12/21/16   Anupam Peters DO       Current medications:    Current Outpatient Medications   Medication Sig Dispense Refill    metoprolol succinate (TOPROL XL) 50 MG extended release tablet Take 1 tablet by mouth every morning 90 tablet 0    cloNIDine (CATAPRES) 0.2 MG tablet Take 1 tablet by mouth 2 times daily 180 tablet 0    allopurinol (ZYLOPRIM) 100 MG tablet Take 1 tablet by mouth 2 times daily 180 tablet 0    oxyCODONE-acetaminophen (PERCOCET) 7.5-325 MG per tablet Take 1 tablet by mouth every 4 hours as needed for Pain.  NARCAN 4 MG/0.1ML LIQD nasal spray instill 1 spray in 1 NOSTRIL if needed for opioid overdose may re. ..  (REFER TO PRESCRIPTION NOTES).   0    losartan (COZAAR) 100 MG tablet Take 1 tablet by mouth every morning 90 tablet 1    famotidine (PEPCID) 20 MG tablet Take 1 tablet by mouth 2 times daily as needed (acid reflux) 60 tablet 3    hydrochlorothiazide (HYDRODIURIL) 12.5 MG tablet Take 1 tablet by mouth daily 90 tablet 0    amLODIPine (NORVASC) 10 MG tablet Take 1 tablet by mouth every morning (before breakfast) 90 tablet 0    pantoprazole (PROTONIX) 40 MG tablet Take 1 tablet by mouth daily 90 tablet 0    atorvastatin (LIPITOR) 10 MG tablet take 1 tablet by mouth at bedtime 90 tablet 3    albuterol sulfate HFA (PROVENTIL HFA) 108 (90 Base) MCG/ACT inhaler Inhale 2 puffs into the lungs 4 times daily 1 Inhaler 2    Dextromethorphan-Guaifenesin ([address] DM

## 2019-05-17 NOTE — PROGRESS NOTES
Please bring your machine if you are to spend the night in the hospital.     PARKING INSTRUCTIONS:   [x] Arrival Time:_0530____________  · [x] Parking lot '\"I\"  is located on [facility] (the corner of [facility] and [facility]). To enter, press the button and the gate will lift. A free token will be provided to exit the lot. One car per patient is allowed to park in this lot. All other cars are to park on [address] [place] [address] Street either in the parking garage or the handicap lot. [] To reach the [facility] lobby from [address] [place] [address], upon entering the hospital, take elevator B to the 3rd floor. EDUCATION INSTRUCTIONS:      [] Knee or hip replacement booklet & exercise pamphlets given. [] [address] placed in chart. [x] Pre-admission Testing educational folder given  [x] Incentive Spirometry,coughing & deep breathing exercises reviewed. []Medication information sheet(s)   []Fluoroscopy-Xray used in surgery reviewed with patient. Educational pamphlet placed in chart. [x]Pain: Post-op pain is normal and to be expected. You will be asked to rate your pain from 0-10(a zero is not acceptable-education is needed). Your post-op pain goal is:  [x] Ask your nurse for your pain medication. [] Joint camp offered. [] Joint replacement booklets given. [] Other:___________________________    MEDICATION INSTRUCTIONS:   [x]Bring a complete list of your medications, please write the last time you took the medicine, give this list to the nurse. [x] Take the following medications the morning of surgery with 1-2 ounces of water: see med list  [] Stop herbal supplements and vitamins 5 days before your surgery. [] DO NOT take any diabetic medicine the morning of surgery. Follow instructions for insulin the day before surgery. [] If you are diabetic and your blood sugar is low or you feel symptomatic, you may drink 1-2 ounces of apple juice or take a glucose tablet.   The morning of your procedure, you may call the pre-op area if you have concerns about your blood sugar [phone]. [x] Use your inhalers the morning of surgery. Bring your emergency inhaler with you day of surgery. [x] Follow physician instructions regarding any blood thinners you may be taking. WHAT TO EXPECT:  [x] The day of surgery you will be greeted and checked in by the [employer] [facility].  In addition, you will be registered in the [facility] by a Patient Access Representative. Please bring your photo ID and insurance card. A nurse will greet you in accordance to the time you are needed in the pre-op area to prepare you for surgery. Please do not be discouraged if you are not greeted in the order you arrive as there are many variables that are involved in patient preparation. Your patience is greatly appreciated as you wait for your nurse. Please bring in items such as: books, magazines, newspapers, electronics, or any other items  to occupy your time in the waiting area. [x]  Delays may occur with surgery and staff will make a sincere effort to keep you informed of delays. If any delays occur with your procedure, we apologize ahead of time for your inconvenience as we recognize the value of your time.

## 2019-05-23 NOTE — H&P
History and Physical    History:  Patient is a 79 y.o. male presenting with long standing GERD from hiatal hernia, admitted for repair. Past Medical History:   Diagnosis Date    CAD (coronary artery disease)     DDD (degenerative disc disease) 2/1/2011    Hypertension     Inguinal hernia     left  S/P repair 11/29/17    Intervertebral disk disease     [facility] [address]     Primary tonsillar squamous cell carcinoma ([facility] [address] 7/8/2013    radiation to area    Psychiatric problem      Past Surgical History:   Procedure Laterality Date    ABDOMEN SURGERY      herni repair as child    CARDIOVASCULAR STRESS TEST  11/25/2012    abnormal stress test    COLONOSCOPY      CORONARY ANGIOPLASTY      stents x2 by Dr Queenie Duncan to marginal and circumflex    CORONARY ANGIOPLASTY WITH STENT PLACEMENT  11/27/2012    primary stenting  with NAZANIN  to 1st OMB of Left CX and pimary NAZANIN to distal  Left CX    CYST REMOVAL  03/11/2015    FROM ABOVE RT EYE BROW    DENTAL SURGERY  04/04/2012    full mouth dental extractions    DIAGNOSTIC CARDIAC CATH LAB PROCEDURE      ECHO COMPL W DOP COLOR FLOW  11/26/2012         HERNIA REPAIR      INGUINAL HERNIA REPAIR  11/29/2017    Dr. Bakari Ashton  7/23/15    WITH BIOPSY    UPPER GASTROINTESTINAL ENDOSCOPY N/A 3/16/2018    EGD BIOPSY performed by Edi Nugent MD at [address] [place] [address]     No current facility-administered medications on file prior to encounter.       Current Outpatient Medications on File Prior to Encounter   Medication Sig Dispense Refill    albuterol sulfate HFA (PROVENTIL HFA) 108 (90 Base) MCG/ACT inhaler Inhale 2 puffs into the lungs 4 times daily 1 Inhaler 2    Dextromethorphan-Guaifenesin (MUCINEX DM MAXIMUM STRENGTH)  MG TB12 Take 1 tablet by mouth 2 times daily 28 tablet 0    aspirin 81 MG EC tablet Take 1 tablet by mouth daily 90 tablet 3    naproxen (NAPROXEN) 500 MG EC tablet Take 1 tablet by mouth 2 times daily (with meals) 60 tablet 3    sildenafil (VIAGRA) 100 MG tablet Take 1 tablet by mouth as needed for Erectile Dysfunction 10 tablet 5    sodium chloride (OCEAN) 0.65 % nasal spray 1 spray by Nasal route as needed for Congestion 1 Bottle 0     No Known Allergies     ROS:  Constitutional: negative  Respiratory: negative  Cardiovascular: negative  Gastrointestinal: positive for reflux symptoms  Genitourinary:negative  Musculoskeletal:negative  Neurological: negative    Physical Exam:  There were no vitals filed for this visit. HEENT: Normalcephalic, sclera white, mucous membranes moist.  Neck: Supple without adenopathy, bruits or JVD. Chest[de-identified] Normal excursion with breath sounds bilateral an normal heart sounds. Abdomen: Flat, soft, non-tender with normal bowel sounds. Back: No midline or flank tenderness. Extremities: No deformities, good pulses. Neurological: A&O, moving all extremities with good strength    Significant Diagnostics:   UGI  Small hiatal hernia associated with gastroesophageal reflux to the   level of hodan     Diagnosis:   Hiatal hernia with reflux esophagitis    Plan:   Laparoscopic hiatal hernia repair with posterior fundoplication.     Electronically signed by  Bradley Fisher MD, 5/23/2019, 9:27 AM

## 2019-05-24 ENCOUNTER — ANESTHESIA (OUTPATIENT)
Dept: OPERATING ROOM | Age: 69
DRG: 328 | End: 2019-05-24
Payer: MEDICARE

## 2019-05-24 ENCOUNTER — HOSPITAL ENCOUNTER (INPATIENT)
Age: 69
LOS: 2 days | Discharge: HOME | DRG: 328 | End: 2019-05-26
Attending: SURGERY | Admitting: SURGERY
Payer: MEDICARE

## 2019-05-24 VITALS — OXYGEN SATURATION: 97 % | DIASTOLIC BLOOD PRESSURE: 106 MMHG | SYSTOLIC BLOOD PRESSURE: 157 MMHG | TEMPERATURE: 94.6 F

## 2019-05-24 DIAGNOSIS — Z98.890 S/P LAPAROSCOPIC FUNDOPLICATION: Primary | ICD-10-CM

## 2019-05-24 LAB
ANION GAP SERPL CALCULATED.3IONS-SCNC: 15 MMOL/L (ref 7–16)
BASOPHILS ABSOLUTE: 0.02 E9/L (ref 0–0.2)
BASOPHILS RELATIVE PERCENT: 0.1 % (ref 0–2)
BUN BLDV-MCNC: 18 MG/DL (ref 8–23)
CALCIUM SERPL-MCNC: 9.5 MG/DL (ref 8.6–10.2)
CHLORIDE BLD-SCNC: 102 MMOL/L (ref 98–107)
CK MB: 5.9 NG/ML (ref 0–7.7)
CO2: 22 MMOL/L (ref 22–29)
CREAT SERPL-MCNC: 1 MG/DL (ref 0.7–1.2)
EOSINOPHILS ABSOLUTE: 0 E9/L (ref 0.05–0.5)
EOSINOPHILS RELATIVE PERCENT: 0 % (ref 0–6)
GFR AFRICAN AMERICAN: >60
GFR NON-AFRICAN AMERICAN: >60 ML/MIN/1.73
GLUCOSE BLD-MCNC: 124 MG/DL (ref 74–99)
HCT VFR BLD CALC: 43.7 % (ref 37–54)
HEMOGLOBIN: 14.6 G/DL (ref 12.5–16.5)
IMMATURE GRANULOCYTES #: 0.05 E9/L
IMMATURE GRANULOCYTES %: 0.4 % (ref 0–5)
LACTIC ACID: 3.3 MMOL/L (ref 0.5–2.2)
LYMPHOCYTES ABSOLUTE: 1.71 E9/L (ref 1.5–4)
LYMPHOCYTES RELATIVE PERCENT: 12.6 % (ref 20–42)
MAGNESIUM: 1.5 MG/DL (ref 1.6–2.6)
MCH RBC QN AUTO: 30.2 PG (ref 26–35)
MCHC RBC AUTO-ENTMCNC: 33.4 % (ref 32–34.5)
MCV RBC AUTO: 90.5 FL (ref 80–99.9)
METER GLUCOSE: 140 MG/DL (ref 74–99)
MONOCYTES ABSOLUTE: 0.43 E9/L (ref 0.1–0.95)
MONOCYTES RELATIVE PERCENT: 3.2 % (ref 2–12)
NEUTROPHILS ABSOLUTE: 11.41 E9/L (ref 1.8–7.3)
NEUTROPHILS RELATIVE PERCENT: 83.7 % (ref 43–80)
PDW BLD-RTO: 13.5 FL (ref 11.5–15)
PHOSPHORUS: 3.3 MG/DL (ref 2.5–4.5)
PLATELET # BLD: 274 E9/L (ref 130–450)
PMV BLD AUTO: 9.7 FL (ref 7–12)
POTASSIUM SERPL-SCNC: 3.6 MMOL/L (ref 3.5–5)
RBC # BLD: 4.83 E12/L (ref 3.8–5.8)
SODIUM BLD-SCNC: 139 MMOL/L (ref 132–146)
TOTAL CK: 355 U/L (ref 20–200)
TROPONIN: <0.01 NG/ML (ref 0–0.03)
WBC # BLD: 13.6 E9/L (ref 4.5–11.5)

## 2019-05-24 PROCEDURE — 6360000002 HC RX W HCPCS: Performed by: STUDENT IN AN ORGANIZED HEALTH CARE EDUCATION/TRAINING PROGRAM

## 2019-05-24 PROCEDURE — 0BQT4ZZ REPAIR DIAPHRAGM, PERCUTANEOUS ENDOSCOPIC APPROACH: ICD-10-PCS | Performed by: STUDENT IN AN ORGANIZED HEALTH CARE EDUCATION/TRAINING PROGRAM

## 2019-05-24 PROCEDURE — 6360000002 HC RX W HCPCS: Performed by: INTERNAL MEDICINE

## 2019-05-24 PROCEDURE — 36415 COLL VENOUS BLD VENIPUNCTURE: CPT

## 2019-05-24 PROCEDURE — 3700000000 HC ANESTHESIA ATTENDED CARE: Performed by: SURGERY

## 2019-05-24 PROCEDURE — 3700000001 HC ADD 15 MINUTES (ANESTHESIA): Performed by: SURGERY

## 2019-05-24 PROCEDURE — 6360000002 HC RX W HCPCS: Performed by: SURGERY

## 2019-05-24 PROCEDURE — 2580000003 HC RX 258: Performed by: INTERNAL MEDICINE

## 2019-05-24 PROCEDURE — 2709999900 HC NON-CHARGEABLE SUPPLY: Performed by: SURGERY

## 2019-05-24 PROCEDURE — 6360000002 HC RX W HCPCS: Performed by: ANESTHESIOLOGY

## 2019-05-24 PROCEDURE — 82962 GLUCOSE BLOOD TEST: CPT

## 2019-05-24 PROCEDURE — 2580000003 HC RX 258

## 2019-05-24 PROCEDURE — 85025 COMPLETE CBC W/AUTO DIFF WBC: CPT

## 2019-05-24 PROCEDURE — 93005 ELECTROCARDIOGRAM TRACING: CPT | Performed by: INTERNAL MEDICINE

## 2019-05-24 PROCEDURE — 84484 ASSAY OF TROPONIN QUANT: CPT

## 2019-05-24 PROCEDURE — 43281 LAP PARAESOPHAG HERN REPAIR: CPT | Performed by: SURGERY

## 2019-05-24 PROCEDURE — 2580000003 HC RX 258: Performed by: SURGERY

## 2019-05-24 PROCEDURE — 2500000003 HC RX 250 WO HCPCS: Performed by: STUDENT IN AN ORGANIZED HEALTH CARE EDUCATION/TRAINING PROGRAM

## 2019-05-24 PROCEDURE — 6370000000 HC RX 637 (ALT 250 FOR IP): Performed by: STUDENT IN AN ORGANIZED HEALTH CARE EDUCATION/TRAINING PROGRAM

## 2019-05-24 PROCEDURE — 2140000000 HC CCU INTERMEDIATE R&B

## 2019-05-24 PROCEDURE — 2720000010 HC SURG SUPPLY STERILE: Performed by: SURGERY

## 2019-05-24 PROCEDURE — 7100000000 HC PACU RECOVERY - FIRST 15 MIN: Performed by: SURGERY

## 2019-05-24 PROCEDURE — 2580000003 HC RX 258: Performed by: STUDENT IN AN ORGANIZED HEALTH CARE EDUCATION/TRAINING PROGRAM

## 2019-05-24 PROCEDURE — 6360000002 HC RX W HCPCS

## 2019-05-24 PROCEDURE — 2780000010 HC IMPLANT OTHER: Performed by: SURGERY

## 2019-05-24 PROCEDURE — 84100 ASSAY OF PHOSPHORUS: CPT

## 2019-05-24 PROCEDURE — 7100000001 HC PACU RECOVERY - ADDTL 15 MIN: Performed by: SURGERY

## 2019-05-24 PROCEDURE — 0DV44ZZ RESTRICTION OF ESOPHAGOGASTRIC JUNCTION, PERCUTANEOUS ENDOSCOPIC APPROACH: ICD-10-PCS | Performed by: STUDENT IN AN ORGANIZED HEALTH CARE EDUCATION/TRAINING PROGRAM

## 2019-05-24 PROCEDURE — 80048 BASIC METABOLIC PNL TOTAL CA: CPT

## 2019-05-24 PROCEDURE — 83735 ASSAY OF MAGNESIUM: CPT

## 2019-05-24 PROCEDURE — 2500000003 HC RX 250 WO HCPCS

## 2019-05-24 PROCEDURE — 82553 CREATINE MB FRACTION: CPT

## 2019-05-24 PROCEDURE — 83605 ASSAY OF LACTIC ACID: CPT

## 2019-05-24 PROCEDURE — 82550 ASSAY OF CK (CPK): CPT

## 2019-05-24 PROCEDURE — 3600000014 HC SURGERY LEVEL 4 ADDTL 15MIN: Performed by: SURGERY

## 2019-05-24 PROCEDURE — 3600000004 HC SURGERY LEVEL 4 BASE: Performed by: SURGERY

## 2019-05-24 RX ORDER — MAGNESIUM SULFATE IN WATER 40 MG/ML
2 INJECTION, SOLUTION INTRAVENOUS ONCE
Status: COMPLETED | OUTPATIENT
Start: 2019-05-24 | End: 2019-05-25

## 2019-05-24 RX ORDER — ONDANSETRON 2 MG/ML
INJECTION INTRAMUSCULAR; INTRAVENOUS PRN
Status: DISCONTINUED | OUTPATIENT
Start: 2019-05-24 | End: 2019-05-24 | Stop reason: SDUPTHER

## 2019-05-24 RX ORDER — VECURONIUM BROMIDE 1 MG/ML
INJECTION, POWDER, LYOPHILIZED, FOR SOLUTION INTRAVENOUS PRN
Status: DISCONTINUED | OUTPATIENT
Start: 2019-05-24 | End: 2019-05-24 | Stop reason: SDUPTHER

## 2019-05-24 RX ORDER — ALLOPURINOL 100 MG/1
100 TABLET ORAL 2 TIMES DAILY
Status: DISCONTINUED | OUTPATIENT
Start: 2019-05-25 | End: 2019-05-26 | Stop reason: HOSPADM

## 2019-05-24 RX ORDER — MIDAZOLAM HYDROCHLORIDE 1 MG/ML
INJECTION INTRAMUSCULAR; INTRAVENOUS PRN
Status: DISCONTINUED | OUTPATIENT
Start: 2019-05-24 | End: 2019-05-24 | Stop reason: SDUPTHER

## 2019-05-24 RX ORDER — NEOSTIGMINE METHYLSULFATE 1 MG/ML
INJECTION, SOLUTION INTRAVENOUS PRN
Status: DISCONTINUED | OUTPATIENT
Start: 2019-05-24 | End: 2019-05-24 | Stop reason: SDUPTHER

## 2019-05-24 RX ORDER — SODIUM CHLORIDE 0.9 % (FLUSH) 0.9 %
10 SYRINGE (ML) INJECTION EVERY 12 HOURS SCHEDULED
Status: DISCONTINUED | OUTPATIENT
Start: 2019-05-24 | End: 2019-05-24 | Stop reason: HOSPADM

## 2019-05-24 RX ORDER — HYDRALAZINE HYDROCHLORIDE 20 MG/ML
10 INJECTION INTRAMUSCULAR; INTRAVENOUS EVERY 30 MIN PRN
Status: DISCONTINUED | OUTPATIENT
Start: 2019-05-24 | End: 2019-05-26 | Stop reason: HOSPADM

## 2019-05-24 RX ORDER — FENTANYL CITRATE 50 UG/ML
INJECTION, SOLUTION INTRAMUSCULAR; INTRAVENOUS PRN
Status: DISCONTINUED | OUTPATIENT
Start: 2019-05-24 | End: 2019-05-24 | Stop reason: SDUPTHER

## 2019-05-24 RX ORDER — ONDANSETRON 2 MG/ML
4 INJECTION INTRAMUSCULAR; INTRAVENOUS EVERY 6 HOURS PRN
Status: DISCONTINUED | OUTPATIENT
Start: 2019-05-24 | End: 2019-05-26 | Stop reason: HOSPADM

## 2019-05-24 RX ORDER — ALBUTEROL SULFATE 90 UG/1
2 AEROSOL, METERED RESPIRATORY (INHALATION) 4 TIMES DAILY
Status: DISCONTINUED | OUTPATIENT
Start: 2019-05-24 | End: 2019-05-26 | Stop reason: HOSPADM

## 2019-05-24 RX ORDER — LABETALOL HYDROCHLORIDE 5 MG/ML
10 INJECTION, SOLUTION INTRAVENOUS EVERY 30 MIN PRN
Status: DISCONTINUED | OUTPATIENT
Start: 2019-05-24 | End: 2019-05-26 | Stop reason: HOSPADM

## 2019-05-24 RX ORDER — OXYCODONE HYDROCHLORIDE AND ACETAMINOPHEN 5; 325 MG/1; MG/1
2 TABLET ORAL EVERY 4 HOURS PRN
Status: DISCONTINUED | OUTPATIENT
Start: 2019-05-24 | End: 2019-05-26 | Stop reason: HOSPADM

## 2019-05-24 RX ORDER — LIDOCAINE HYDROCHLORIDE 20 MG/ML
INJECTION, SOLUTION INTRAVENOUS PRN
Status: DISCONTINUED | OUTPATIENT
Start: 2019-05-24 | End: 2019-05-24 | Stop reason: SDUPTHER

## 2019-05-24 RX ORDER — SODIUM CHLORIDE, SODIUM LACTATE, POTASSIUM CHLORIDE, CALCIUM CHLORIDE 600; 310; 30; 20 MG/100ML; MG/100ML; MG/100ML; MG/100ML
INJECTION, SOLUTION INTRAVENOUS CONTINUOUS
Status: DISCONTINUED | OUTPATIENT
Start: 2019-05-24 | End: 2019-05-25

## 2019-05-24 RX ORDER — HYDRALAZINE HYDROCHLORIDE 20 MG/ML
INJECTION INTRAMUSCULAR; INTRAVENOUS PRN
Status: DISCONTINUED | OUTPATIENT
Start: 2019-05-24 | End: 2019-05-24 | Stop reason: SDUPTHER

## 2019-05-24 RX ORDER — SODIUM CHLORIDE 0.9 % (FLUSH) 0.9 %
10 SYRINGE (ML) INJECTION PRN
Status: DISCONTINUED | OUTPATIENT
Start: 2019-05-24 | End: 2019-05-26 | Stop reason: HOSPADM

## 2019-05-24 RX ORDER — HYDROCHLOROTHIAZIDE 12.5 MG/1
12.5 TABLET ORAL DAILY
Status: DISCONTINUED | OUTPATIENT
Start: 2019-05-25 | End: 2019-05-26 | Stop reason: HOSPADM

## 2019-05-24 RX ORDER — AMLODIPINE BESYLATE 10 MG/1
10 TABLET ORAL
Status: DISCONTINUED | OUTPATIENT
Start: 2019-05-25 | End: 2019-05-26 | Stop reason: HOSPADM

## 2019-05-24 RX ORDER — MORPHINE SULFATE 2 MG/ML
2 INJECTION, SOLUTION INTRAMUSCULAR; INTRAVENOUS
Status: DISCONTINUED | OUTPATIENT
Start: 2019-05-24 | End: 2019-05-25

## 2019-05-24 RX ORDER — GLYCOPYRROLATE 1 MG/5 ML
SYRINGE (ML) INTRAVENOUS PRN
Status: DISCONTINUED | OUTPATIENT
Start: 2019-05-24 | End: 2019-05-24 | Stop reason: SDUPTHER

## 2019-05-24 RX ORDER — PHENYLEPHRINE HYDROCHLORIDE 10 MG/ML
INJECTION INTRAVENOUS PRN
Status: DISCONTINUED | OUTPATIENT
Start: 2019-05-24 | End: 2019-05-24 | Stop reason: SDUPTHER

## 2019-05-24 RX ORDER — LIDOCAINE HYDROCHLORIDE ANHYDROUS AND DEXTROSE MONOHYDRATE .4; 5 G/100ML; G/100ML
INJECTION, SOLUTION INTRAVENOUS CONTINUOUS PRN
Status: DISCONTINUED | OUTPATIENT
Start: 2019-05-24 | End: 2019-05-24 | Stop reason: SDUPTHER

## 2019-05-24 RX ORDER — METOPROLOL SUCCINATE 50 MG/1
50 TABLET, EXTENDED RELEASE ORAL EVERY MORNING
Status: DISCONTINUED | OUTPATIENT
Start: 2019-05-25 | End: 2019-05-24

## 2019-05-24 RX ORDER — CEFAZOLIN SODIUM 2 G/50ML
2 SOLUTION INTRAVENOUS
Status: COMPLETED | OUTPATIENT
Start: 2019-05-24 | End: 2019-05-24

## 2019-05-24 RX ORDER — PROMETHAZINE HYDROCHLORIDE 25 MG/ML
6.25 INJECTION, SOLUTION INTRAMUSCULAR; INTRAVENOUS
Status: DISCONTINUED | OUTPATIENT
Start: 2019-05-24 | End: 2019-05-24 | Stop reason: HOSPADM

## 2019-05-24 RX ORDER — 0.9 % SODIUM CHLORIDE 0.9 %
500 INTRAVENOUS SOLUTION INTRAVENOUS ONCE
Status: COMPLETED | OUTPATIENT
Start: 2019-05-24 | End: 2019-05-25

## 2019-05-24 RX ORDER — SODIUM CHLORIDE 0.9 % (FLUSH) 0.9 %
10 SYRINGE (ML) INJECTION PRN
Status: DISCONTINUED | OUTPATIENT
Start: 2019-05-24 | End: 2019-05-24 | Stop reason: HOSPADM

## 2019-05-24 RX ORDER — METOPROLOL SUCCINATE 50 MG/1
50 TABLET, EXTENDED RELEASE ORAL EVERY MORNING
Status: DISCONTINUED | OUTPATIENT
Start: 2019-05-24 | End: 2019-05-26 | Stop reason: HOSPADM

## 2019-05-24 RX ORDER — ATORVASTATIN CALCIUM 10 MG/1
10 TABLET, FILM COATED ORAL NIGHTLY
Status: DISCONTINUED | OUTPATIENT
Start: 2019-05-24 | End: 2019-05-26 | Stop reason: HOSPADM

## 2019-05-24 RX ORDER — LABETALOL HYDROCHLORIDE 5 MG/ML
INJECTION, SOLUTION INTRAVENOUS PRN
Status: DISCONTINUED | OUTPATIENT
Start: 2019-05-24 | End: 2019-05-24 | Stop reason: SDUPTHER

## 2019-05-24 RX ORDER — HYDROCHLOROTHIAZIDE 12.5 MG/1
12.5 TABLET ORAL DAILY
Status: DISCONTINUED | OUTPATIENT
Start: 2019-05-24 | End: 2019-05-24

## 2019-05-24 RX ORDER — PANTOPRAZOLE SODIUM 40 MG/1
40 TABLET, DELAYED RELEASE ORAL DAILY
Status: DISCONTINUED | OUTPATIENT
Start: 2019-05-25 | End: 2019-05-26 | Stop reason: HOSPADM

## 2019-05-24 RX ORDER — PROPOFOL 10 MG/ML
INJECTION, EMULSION INTRAVENOUS PRN
Status: DISCONTINUED | OUTPATIENT
Start: 2019-05-24 | End: 2019-05-24 | Stop reason: SDUPTHER

## 2019-05-24 RX ORDER — CLONIDINE HYDROCHLORIDE 0.2 MG/1
0.2 TABLET ORAL 2 TIMES DAILY
Status: DISCONTINUED | OUTPATIENT
Start: 2019-05-24 | End: 2019-05-26 | Stop reason: HOSPADM

## 2019-05-24 RX ORDER — SUCCINYLCHOLINE/SOD CL,ISO/PF 200MG/10ML
SYRINGE (ML) INTRAVENOUS PRN
Status: DISCONTINUED | OUTPATIENT
Start: 2019-05-24 | End: 2019-05-24 | Stop reason: SDUPTHER

## 2019-05-24 RX ORDER — SODIUM CHLORIDE, SODIUM LACTATE, POTASSIUM CHLORIDE, CALCIUM CHLORIDE 600; 310; 30; 20 MG/100ML; MG/100ML; MG/100ML; MG/100ML
INJECTION, SOLUTION INTRAVENOUS CONTINUOUS
Status: DISCONTINUED | OUTPATIENT
Start: 2019-05-24 | End: 2019-05-24

## 2019-05-24 RX ORDER — CLONIDINE HYDROCHLORIDE 0.2 MG/1
0.2 TABLET ORAL 2 TIMES DAILY
Status: DISCONTINUED | OUTPATIENT
Start: 2019-05-25 | End: 2019-05-24

## 2019-05-24 RX ORDER — SODIUM CHLORIDE 0.9 % (FLUSH) 0.9 %
10 SYRINGE (ML) INJECTION EVERY 12 HOURS SCHEDULED
Status: DISCONTINUED | OUTPATIENT
Start: 2019-05-24 | End: 2019-05-26 | Stop reason: HOSPADM

## 2019-05-24 RX ORDER — OXYCODONE HYDROCHLORIDE AND ACETAMINOPHEN 5; 325 MG/1; MG/1
1 TABLET ORAL EVERY 6 HOURS PRN
Qty: 20 TABLET | Refills: 0 | Status: SHIPPED | OUTPATIENT
Start: 2019-05-24 | End: 2019-05-27

## 2019-05-24 RX ORDER — HYDRALAZINE HYDROCHLORIDE 20 MG/ML
5 INJECTION INTRAMUSCULAR; INTRAVENOUS EVERY 10 MIN PRN
Status: DISCONTINUED | OUTPATIENT
Start: 2019-05-24 | End: 2019-05-24 | Stop reason: HOSPADM

## 2019-05-24 RX ORDER — ASPIRIN 81 MG/1
81 TABLET ORAL DAILY
Status: DISCONTINUED | OUTPATIENT
Start: 2019-05-25 | End: 2019-05-26 | Stop reason: HOSPADM

## 2019-05-24 RX ORDER — DEXAMETHASONE SODIUM PHOSPHATE 10 MG/ML
INJECTION INTRAMUSCULAR; INTRAVENOUS PRN
Status: DISCONTINUED | OUTPATIENT
Start: 2019-05-24 | End: 2019-05-24 | Stop reason: SDUPTHER

## 2019-05-24 RX ORDER — SODIUM CHLORIDE 9 MG/ML
INJECTION, SOLUTION INTRAVENOUS CONTINUOUS PRN
Status: DISCONTINUED | OUTPATIENT
Start: 2019-05-24 | End: 2019-05-24 | Stop reason: SDUPTHER

## 2019-05-24 RX ORDER — OXYCODONE HYDROCHLORIDE AND ACETAMINOPHEN 5; 325 MG/1; MG/1
1 TABLET ORAL EVERY 4 HOURS PRN
Status: DISCONTINUED | OUTPATIENT
Start: 2019-05-24 | End: 2019-05-26 | Stop reason: HOSPADM

## 2019-05-24 RX ORDER — MORPHINE SULFATE 4 MG/ML
4 INJECTION, SOLUTION INTRAMUSCULAR; INTRAVENOUS
Status: DISCONTINUED | OUTPATIENT
Start: 2019-05-24 | End: 2019-05-25

## 2019-05-24 RX ORDER — ACETAMINOPHEN 325 MG/1
650 TABLET ORAL EVERY 4 HOURS PRN
Status: DISCONTINUED | OUTPATIENT
Start: 2019-05-24 | End: 2019-05-26 | Stop reason: HOSPADM

## 2019-05-24 RX ADMIN — LIDOCAINE HYDROCHLORIDE 100 MG: 20 INJECTION, SOLUTION INTRAVENOUS at 07:21

## 2019-05-24 RX ADMIN — LABETALOL HYDROCHLORIDE 10 MG: 5 INJECTION INTRAVENOUS at 21:10

## 2019-05-24 RX ADMIN — DEXTROSE MONOHYDRATE 3 MCG/KG/MIN: 50 INJECTION, SOLUTION INTRAVENOUS at 07:33

## 2019-05-24 RX ADMIN — HYDROMORPHONE HYDROCHLORIDE 0.5 MG: 1 INJECTION, SOLUTION INTRAMUSCULAR; INTRAVENOUS; SUBCUTANEOUS at 10:40

## 2019-05-24 RX ADMIN — FENTANYL CITRATE 100 MCG: 50 INJECTION, SOLUTION INTRAMUSCULAR; INTRAVENOUS at 07:46

## 2019-05-24 RX ADMIN — ALBUTEROL SULFATE 2 PUFF: 90 AEROSOL, METERED RESPIRATORY (INHALATION) at 13:04

## 2019-05-24 RX ADMIN — HYDROMORPHONE HYDROCHLORIDE 1 MG: 1 INJECTION, SOLUTION INTRAMUSCULAR; INTRAVENOUS; SUBCUTANEOUS at 10:30

## 2019-05-24 RX ADMIN — VECURONIUM BROMIDE FOR INJECTION 1 MG: 1 INJECTION, POWDER, LYOPHILIZED, FOR SOLUTION INTRAVENOUS at 07:21

## 2019-05-24 RX ADMIN — LABETALOL HYDROCHLORIDE 10 MG: 5 INJECTION INTRAVENOUS at 17:35

## 2019-05-24 RX ADMIN — Medication 140 MG: at 07:21

## 2019-05-24 RX ADMIN — SODIUM CHLORIDE, POTASSIUM CHLORIDE, SODIUM LACTATE AND CALCIUM CHLORIDE: 600; 310; 30; 20 INJECTION, SOLUTION INTRAVENOUS at 06:04

## 2019-05-24 RX ADMIN — FENTANYL CITRATE 100 MCG: 50 INJECTION, SOLUTION INTRAMUSCULAR; INTRAVENOUS at 07:21

## 2019-05-24 RX ADMIN — PHENYLEPHRINE HYDROCHLORIDE 100 MCG: 10 INJECTION INTRAVENOUS at 09:05

## 2019-05-24 RX ADMIN — VECURONIUM BROMIDE FOR INJECTION 2 MG: 1 INJECTION, POWDER, LYOPHILIZED, FOR SOLUTION INTRAVENOUS at 09:25

## 2019-05-24 RX ADMIN — Medication 10 ML: at 21:11

## 2019-05-24 RX ADMIN — HYDRALAZINE HYDROCHLORIDE 5 MG: 20 INJECTION INTRAMUSCULAR; INTRAVENOUS at 10:44

## 2019-05-24 RX ADMIN — PROPOFOL 200 MG: 10 INJECTION, EMULSION INTRAVENOUS at 07:21

## 2019-05-24 RX ADMIN — FENTANYL CITRATE 50 MCG: 50 INJECTION, SOLUTION INTRAMUSCULAR; INTRAVENOUS at 07:54

## 2019-05-24 RX ADMIN — MIDAZOLAM HYDROCHLORIDE 2 MG: 1 INJECTION, SOLUTION INTRAMUSCULAR; INTRAVENOUS at 07:16

## 2019-05-24 RX ADMIN — VECURONIUM BROMIDE FOR INJECTION 2 MG: 1 INJECTION, POWDER, LYOPHILIZED, FOR SOLUTION INTRAVENOUS at 08:21

## 2019-05-24 RX ADMIN — VECURONIUM BROMIDE FOR INJECTION 9 MG: 1 INJECTION, POWDER, LYOPHILIZED, FOR SOLUTION INTRAVENOUS at 07:33

## 2019-05-24 RX ADMIN — LABETALOL HYDROCHLORIDE 10 MG: 5 INJECTION INTRAVENOUS at 18:44

## 2019-05-24 RX ADMIN — SODIUM CHLORIDE: 9 INJECTION, SOLUTION INTRAVENOUS at 07:28

## 2019-05-24 RX ADMIN — LABETALOL HYDROCHLORIDE 5 MG: 5 INJECTION INTRAVENOUS at 07:46

## 2019-05-24 RX ADMIN — SODIUM CHLORIDE, POTASSIUM CHLORIDE, SODIUM LACTATE AND CALCIUM CHLORIDE: 600; 310; 30; 20 INJECTION, SOLUTION INTRAVENOUS at 09:27

## 2019-05-24 RX ADMIN — VECURONIUM BROMIDE FOR INJECTION 1 MG: 1 INJECTION, POWDER, LYOPHILIZED, FOR SOLUTION INTRAVENOUS at 08:49

## 2019-05-24 RX ADMIN — PHENYLEPHRINE HYDROCHLORIDE 100 MCG: 10 INJECTION INTRAVENOUS at 08:56

## 2019-05-24 RX ADMIN — ONDANSETRON HYDROCHLORIDE 4 MG: 2 SOLUTION INTRAMUSCULAR; INTRAVENOUS at 22:04

## 2019-05-24 RX ADMIN — HYDROMORPHONE HYDROCHLORIDE 0.5 MG: 1 INJECTION, SOLUTION INTRAMUSCULAR; INTRAVENOUS; SUBCUTANEOUS at 10:52

## 2019-05-24 RX ADMIN — HYDRALAZINE HYDROCHLORIDE 10 MG: 20 INJECTION, SOLUTION INTRAMUSCULAR; INTRAVENOUS at 18:59

## 2019-05-24 RX ADMIN — MORPHINE SULFATE 4 MG: 4 INJECTION INTRAVENOUS at 12:21

## 2019-05-24 RX ADMIN — Medication 3 MG: at 10:11

## 2019-05-24 RX ADMIN — SODIUM CHLORIDE: 9 INJECTION, SOLUTION INTRAVENOUS at 09:27

## 2019-05-24 RX ADMIN — ONDANSETRON HYDROCHLORIDE 4 MG: 2 INJECTION, SOLUTION INTRAMUSCULAR; INTRAVENOUS at 09:56

## 2019-05-24 RX ADMIN — SODIUM CHLORIDE, POTASSIUM CHLORIDE, SODIUM LACTATE AND CALCIUM CHLORIDE: 600; 310; 30; 20 INJECTION, SOLUTION INTRAVENOUS at 12:10

## 2019-05-24 RX ADMIN — METOPROLOL SUCCINATE 50 MG: 50 TABLET, EXTENDED RELEASE ORAL at 19:35

## 2019-05-24 RX ADMIN — LABETALOL HYDROCHLORIDE 10 MG: 5 INJECTION INTRAVENOUS at 07:54

## 2019-05-24 RX ADMIN — LIDOCAINE HYDROCHLORIDE ANHYDROUS AND DEXTROSE MONOHYDRATE 3.3 MG/MIN: .4; 5 INJECTION, SOLUTION INTRAVENOUS at 07:25

## 2019-05-24 RX ADMIN — Medication 10 ML: at 17:36

## 2019-05-24 RX ADMIN — SODIUM CHLORIDE 500 ML: 9 INJECTION, SOLUTION INTRAVENOUS at 22:21

## 2019-05-24 RX ADMIN — MORPHINE SULFATE 4 MG: 4 INJECTION INTRAVENOUS at 21:52

## 2019-05-24 RX ADMIN — HYDRALAZINE HYDROCHLORIDE 10 MG: 20 INJECTION, SOLUTION INTRAMUSCULAR; INTRAVENOUS at 22:05

## 2019-05-24 RX ADMIN — Medication 0.6 MG: at 10:11

## 2019-05-24 RX ADMIN — MAGNESIUM SULFATE HEPTAHYDRATE 2 G: 40 INJECTION, SOLUTION INTRAVENOUS at 22:27

## 2019-05-24 RX ADMIN — HYDROCHLOROTHIAZIDE 12.5 MG: 12.5 TABLET ORAL at 13:04

## 2019-05-24 RX ADMIN — Medication 10 ML: at 18:59

## 2019-05-24 RX ADMIN — CEFAZOLIN SODIUM 2 G: 2 SOLUTION INTRAVENOUS at 07:25

## 2019-05-24 RX ADMIN — DEXAMETHASONE SODIUM PHOSPHATE 10 MG: 10 INJECTION INTRAMUSCULAR; INTRAVENOUS at 07:21

## 2019-05-24 RX ADMIN — CLONIDINE HYDROCHLORIDE 0.2 MG: 0.2 TABLET ORAL at 22:02

## 2019-05-24 RX ADMIN — HYDRALAZINE HYDROCHLORIDE 10 MG: 20 INJECTION INTRAMUSCULAR; INTRAVENOUS at 07:50

## 2019-05-24 ASSESSMENT — PULMONARY FUNCTION TESTS
PIF_VALUE: 30
PIF_VALUE: 39
PIF_VALUE: 40
PIF_VALUE: 36
PIF_VALUE: 43
PIF_VALUE: 35
PIF_VALUE: 37
PIF_VALUE: 38
PIF_VALUE: 41
PIF_VALUE: 37
PIF_VALUE: 37
PIF_VALUE: 40
PIF_VALUE: 0
PIF_VALUE: 41
PIF_VALUE: 1
PIF_VALUE: 38
PIF_VALUE: 42
PIF_VALUE: 34
PIF_VALUE: 5
PIF_VALUE: 39
PIF_VALUE: 2
PIF_VALUE: 39
PIF_VALUE: 4
PIF_VALUE: 42
PIF_VALUE: 41
PIF_VALUE: 34
PIF_VALUE: 30
PIF_VALUE: 34
PIF_VALUE: 37
PIF_VALUE: 37
PIF_VALUE: 41
PIF_VALUE: 33
PIF_VALUE: 34
PIF_VALUE: 38
PIF_VALUE: 33
PIF_VALUE: 40
PIF_VALUE: 35
PIF_VALUE: 35
PIF_VALUE: 1
PIF_VALUE: 36
PIF_VALUE: 27
PIF_VALUE: 38
PIF_VALUE: 41
PIF_VALUE: 39
PIF_VALUE: 1
PIF_VALUE: 38
PIF_VALUE: 39
PIF_VALUE: 37
PIF_VALUE: 38
PIF_VALUE: 32
PIF_VALUE: 39
PIF_VALUE: 34
PIF_VALUE: 40
PIF_VALUE: 37
PIF_VALUE: 31
PIF_VALUE: 33
PIF_VALUE: 40
PIF_VALUE: 39
PIF_VALUE: 36
PIF_VALUE: 40
PIF_VALUE: 30
PIF_VALUE: 34
PIF_VALUE: 39
PIF_VALUE: 38
PIF_VALUE: 38
PIF_VALUE: 42
PIF_VALUE: 39
PIF_VALUE: 34
PIF_VALUE: 39
PIF_VALUE: 34
PIF_VALUE: 37
PIF_VALUE: 35
PIF_VALUE: 26
PIF_VALUE: 40
PIF_VALUE: 38
PIF_VALUE: 39
PIF_VALUE: 38
PIF_VALUE: 39
PIF_VALUE: 40
PIF_VALUE: 39
PIF_VALUE: 37
PIF_VALUE: 33
PIF_VALUE: 0
PIF_VALUE: 36
PIF_VALUE: 39
PIF_VALUE: 34
PIF_VALUE: 42
PIF_VALUE: 34
PIF_VALUE: 37
PIF_VALUE: 39
PIF_VALUE: 29
PIF_VALUE: 33
PIF_VALUE: 37
PIF_VALUE: 38
PIF_VALUE: 41
PIF_VALUE: 38
PIF_VALUE: 40
PIF_VALUE: 2
PIF_VALUE: 28
PIF_VALUE: 34
PIF_VALUE: 3
PIF_VALUE: 37
PIF_VALUE: 27
PIF_VALUE: 39
PIF_VALUE: 37
PIF_VALUE: 33
PIF_VALUE: 35
PIF_VALUE: 41
PIF_VALUE: 35
PIF_VALUE: 33
PIF_VALUE: 33
PIF_VALUE: 38
PIF_VALUE: 39
PIF_VALUE: 39
PIF_VALUE: 0
PIF_VALUE: 29
PIF_VALUE: 31
PIF_VALUE: 33
PIF_VALUE: 38
PIF_VALUE: 37
PIF_VALUE: 39
PIF_VALUE: 2
PIF_VALUE: 34
PIF_VALUE: 3
PIF_VALUE: 34
PIF_VALUE: 43
PIF_VALUE: 39
PIF_VALUE: 41
PIF_VALUE: 38
PIF_VALUE: 35
PIF_VALUE: 36
PIF_VALUE: 29
PIF_VALUE: 33
PIF_VALUE: 31
PIF_VALUE: 36
PIF_VALUE: 39
PIF_VALUE: 35
PIF_VALUE: 34
PIF_VALUE: 40
PIF_VALUE: 29
PIF_VALUE: 27
PIF_VALUE: 35
PIF_VALUE: 34
PIF_VALUE: 35
PIF_VALUE: 39
PIF_VALUE: 38
PIF_VALUE: 39
PIF_VALUE: 1
PIF_VALUE: 40
PIF_VALUE: 32
PIF_VALUE: 38
PIF_VALUE: 34
PIF_VALUE: 37
PIF_VALUE: 40
PIF_VALUE: 42
PIF_VALUE: 37
PIF_VALUE: 33
PIF_VALUE: 42
PIF_VALUE: 40
PIF_VALUE: 37
PIF_VALUE: 41
PIF_VALUE: 2
PIF_VALUE: 39
PIF_VALUE: 34
PIF_VALUE: 39
PIF_VALUE: 28
PIF_VALUE: 39
PIF_VALUE: 35
PIF_VALUE: 38
PIF_VALUE: 40
PIF_VALUE: 39
PIF_VALUE: 34
PIF_VALUE: 40
PIF_VALUE: 39
PIF_VALUE: 2
PIF_VALUE: 32
PIF_VALUE: 34
PIF_VALUE: 29
PIF_VALUE: 35

## 2019-05-24 ASSESSMENT — PAIN DESCRIPTION - DESCRIPTORS
DESCRIPTORS: ACHING;BURNING;CONSTANT
DESCRIPTORS: ACHING
DESCRIPTORS: ACHING;BURNING;CONSTANT
DESCRIPTORS: ACHING;BURNING;CONSTANT
DESCRIPTORS: ACHING;CONSTANT;CRAMPING
DESCRIPTORS: CONSTANT;DISCOMFORT

## 2019-05-24 ASSESSMENT — PAIN DESCRIPTION - PAIN TYPE
TYPE: SURGICAL PAIN

## 2019-05-24 ASSESSMENT — PAIN DESCRIPTION - LOCATION
LOCATION: ABDOMEN

## 2019-05-24 ASSESSMENT — PAIN SCALES - GENERAL
PAINLEVEL_OUTOF10: 7
PAINLEVEL_OUTOF10: 6
PAINLEVEL_OUTOF10: 7
PAINLEVEL_OUTOF10: 5
PAINLEVEL_OUTOF10: 4
PAINLEVEL_OUTOF10: 0
PAINLEVEL_OUTOF10: 10
PAINLEVEL_OUTOF10: 9

## 2019-05-24 ASSESSMENT — PAIN DESCRIPTION - ORIENTATION
ORIENTATION: MID
ORIENTATION: MID;LOWER

## 2019-05-24 ASSESSMENT — PAIN DESCRIPTION - FREQUENCY
FREQUENCY: CONTINUOUS
FREQUENCY: CONTINUOUS

## 2019-05-24 ASSESSMENT — PAIN - FUNCTIONAL ASSESSMENT
PAIN_FUNCTIONAL_ASSESSMENT: ACTIVITIES ARE NOT PREVENTED
PAIN_FUNCTIONAL_ASSESSMENT: 0-10

## 2019-05-24 ASSESSMENT — PAIN DESCRIPTION - PROGRESSION
CLINICAL_PROGRESSION: NOT CHANGED
CLINICAL_PROGRESSION: GRADUALLY WORSENING

## 2019-05-24 ASSESSMENT — PAIN DESCRIPTION - ONSET
ONSET: ON-GOING
ONSET: AWAKENED FROM SLEEP

## 2019-05-24 NOTE — INTERVAL H&P NOTE
H&P Update    Patient's History and Physical from May 23, 2019 was reviewed. Patient examined. There has been no change. Plan to proceed to OR for laparoscopic hiatal hernia repair with nissen fundoplication. All questions answered.      Ira Stark

## 2019-05-24 NOTE — PROGRESS NOTES
FLOOR CALLED. NURSE TO NURSE GIVEN. THE PATIENT'S TEST RESULTS REVIEWED, VITAL SIGNS REPORTED TO RECEIVING NURSE. ANY AND ALL IMPORTANT INFORMATION REGARDING PT DISCLOSED.

## 2019-05-24 NOTE — PROGRESS NOTES
Patient admitted to pre op. He states he had to drive here today because dial a ride did not show up. His car in the I lot. Will notify security and inform it may be there over night. He is informed that he is not allowed to drive home today.   He is aware and will have someone  the car later or tomorrow am

## 2019-05-24 NOTE — PROGRESS NOTES
ADMITTED INTO PACU, REPORT FROM ANESTHESIA,  MONITORS APPLIED, STRIPTS PRINTED, FAMILY CALLED FOR UPDATE.

## 2019-05-24 NOTE — ANESTHESIA POSTPROCEDURE EVALUATION
Department of Anesthesiology  Postprocedure Note    Patient: Cameron Umana MRN: [id number]  YOB: 1950  Date of evaluation: 5/24/2019  Time:  12:19 PM     Procedure Summary     Date:  05/24/19 Room / Location:  [facility] OR 07 / [facility] OR    Anesthesia Start:  0716 Anesthesia Stop:  1034    Procedure:  LAPAROSCOPIC NISSEN FUNDOPLICATION (N/A ) Diagnosis:  (GERD)    Surgeon:  Lora Christianson MD Responsible Provider:  Susan Mcgarry MD    Anesthesia Type:  general ASA Status:  3          Anesthesia Type: general    Sunshine Phase I: Sunshine Score: 10    Sunshine Phase II:      Last vitals: Reviewed and per EMR flowsheets.        Anesthesia Post Evaluation    Patient location during evaluation: PACU  Patient participation: complete - patient participated  Level of consciousness: awake and alert  Pain score: 3  Airway patency: patent  Nausea & Vomiting: no vomiting and no nausea  Complications: no  Cardiovascular status: hemodynamically stable  Respiratory status: spontaneous ventilation  Hydration status: stable

## 2019-05-24 NOTE — PROGRESS NOTES
Pre op note; met and reviewed operative plan with patient. He understands and agrees to proceed with laparoscopic hiatal hernia repair with posterior fundoplication.

## 2019-05-24 NOTE — OP NOTE
dissected away from the esophagus. Once the esophagus was dissected circumferentially and we completed our mediastinal dissection gaining at least 3cm of intra-abdominal esophagus, a penrose drain was placed around the esophagus for better retraction. The esophagus was retracted superior and lateral and the apex of the two crura was seen. A 54 fr Bougie was placed by anesthesia into the esophagus. We then re-approximated the sindi with 2-0 Ethibond suture with two stitches posterior to the esophagus. The fundus of the stomach was then dissected from its lateral attachments to the spleen and diaphragm. We used the Harmonic device to dissect along the greater curve of the stomach cephalad towards the spleen. Care was taken to identify the short gastric vessels which were transected with the harmonic. Once the fundus was completely freed of all of its attachments, a grasper was passed beneath the esophagus and the fundus was grasped and pulled posterior to the esophagus and rested medial. A shoeshine technique was preformed to ensure no tension was placed on the wrap. A 260 degree toupet fundoplication was preformed using 2-0 Ethibond to secure the fundus to the esophagus and sindi on either side of the wrap. Three sutures were placed on either side. The fundoplication did not appear to have any tension and the repair was intact allowing a 54 fr bougie to pass through. Satisfied with our repair and fundoplication, The two 10 mm port sites were closed with 0- Vicryl sutures using a Luis Alfredo Db device. The liver retractor was removed and remaining ports were taken out under direct visualization. The abdomen was desufflated and port site incisions were closed with 4-0 Monocryl suture. Skin glue was applied. The patient tolerated the procedure well and was sent to PACU in stable condition. All sponge and instrument counts were correct x2. Dr. Jer Chavez was present and scrubbed for the entire procedure. Electronically signed by Ira Stark MD on 5/24/19 at 10:49 AM

## 2019-05-24 NOTE — PROGRESS NOTES
Pt and family inquiring about his personal belongings not with him, I called PACU and spoke to Nathaniel, she will send up his belongings.  informed of the same.

## 2019-05-25 LAB
ANION GAP SERPL CALCULATED.3IONS-SCNC: 17 MMOL/L (ref 7–16)
BASOPHILS ABSOLUTE: 0.01 E9/L (ref 0–0.2)
BASOPHILS RELATIVE PERCENT: 0.1 % (ref 0–2)
BUN BLDV-MCNC: 19 MG/DL (ref 8–23)
CALCIUM SERPL-MCNC: 9.6 MG/DL (ref 8.6–10.2)
CHLORIDE BLD-SCNC: 101 MMOL/L (ref 98–107)
CO2: 21 MMOL/L (ref 22–29)
CREAT SERPL-MCNC: 1.1 MG/DL (ref 0.7–1.2)
EKG ATRIAL RATE: 101 BPM
EKG P-R INTERVAL: 154 MS
EKG Q-T INTERVAL: 384 MS
EKG QRS DURATION: 96 MS
EKG QTC CALCULATION (BAZETT): 497 MS
EKG R AXIS: -145 DEGREES
EKG T AXIS: 137 DEGREES
EKG VENTRICULAR RATE: 101 BPM
EOSINOPHILS ABSOLUTE: 0 E9/L (ref 0.05–0.5)
EOSINOPHILS RELATIVE PERCENT: 0 % (ref 0–6)
GFR AFRICAN AMERICAN: >60
GFR NON-AFRICAN AMERICAN: >60 ML/MIN/1.73
GLUCOSE BLD-MCNC: 120 MG/DL (ref 74–99)
HCT VFR BLD CALC: 41.6 % (ref 37–54)
HEMOGLOBIN: 14.2 G/DL (ref 12.5–16.5)
IMMATURE GRANULOCYTES #: 0.08 E9/L
IMMATURE GRANULOCYTES %: 0.6 % (ref 0–5)
LACTIC ACID: 1.4 MMOL/L (ref 0.5–2.2)
LYMPHOCYTES ABSOLUTE: 2.59 E9/L (ref 1.5–4)
LYMPHOCYTES RELATIVE PERCENT: 18.2 % (ref 20–42)
MCH RBC QN AUTO: 30.7 PG (ref 26–35)
MCHC RBC AUTO-ENTMCNC: 34.1 % (ref 32–34.5)
MCV RBC AUTO: 89.8 FL (ref 80–99.9)
MONOCYTES ABSOLUTE: 1.16 E9/L (ref 0.1–0.95)
MONOCYTES RELATIVE PERCENT: 8.2 % (ref 2–12)
NEUTROPHILS ABSOLUTE: 10.38 E9/L (ref 1.8–7.3)
NEUTROPHILS RELATIVE PERCENT: 72.9 % (ref 43–80)
PDW BLD-RTO: 13.9 FL (ref 11.5–15)
PLATELET # BLD: 274 E9/L (ref 130–450)
PMV BLD AUTO: 9.8 FL (ref 7–12)
POTASSIUM REFLEX MAGNESIUM: 3.6 MMOL/L (ref 3.5–5)
RBC # BLD: 4.63 E12/L (ref 3.8–5.8)
SODIUM BLD-SCNC: 139 MMOL/L (ref 132–146)
WBC # BLD: 14.2 E9/L (ref 4.5–11.5)

## 2019-05-25 PROCEDURE — 93010 ELECTROCARDIOGRAM REPORT: CPT | Performed by: INTERNAL MEDICINE

## 2019-05-25 PROCEDURE — 36415 COLL VENOUS BLD VENIPUNCTURE: CPT

## 2019-05-25 PROCEDURE — 99024 POSTOP FOLLOW-UP VISIT: CPT | Performed by: SURGERY

## 2019-05-25 PROCEDURE — 2580000003 HC RX 258: Performed by: STUDENT IN AN ORGANIZED HEALTH CARE EDUCATION/TRAINING PROGRAM

## 2019-05-25 PROCEDURE — 6360000002 HC RX W HCPCS: Performed by: STUDENT IN AN ORGANIZED HEALTH CARE EDUCATION/TRAINING PROGRAM

## 2019-05-25 PROCEDURE — 6370000000 HC RX 637 (ALT 250 FOR IP): Performed by: STUDENT IN AN ORGANIZED HEALTH CARE EDUCATION/TRAINING PROGRAM

## 2019-05-25 PROCEDURE — 80048 BASIC METABOLIC PNL TOTAL CA: CPT

## 2019-05-25 PROCEDURE — 2140000000 HC CCU INTERMEDIATE R&B

## 2019-05-25 PROCEDURE — 83605 ASSAY OF LACTIC ACID: CPT

## 2019-05-25 PROCEDURE — 85025 COMPLETE CBC W/AUTO DIFF WBC: CPT

## 2019-05-25 RX ORDER — LOSARTAN POTASSIUM 50 MG/1
100 TABLET ORAL EVERY MORNING
Status: DISCONTINUED | OUTPATIENT
Start: 2019-05-25 | End: 2019-05-26 | Stop reason: HOSPADM

## 2019-05-25 RX ORDER — MORPHINE SULFATE 2 MG/ML
2 INJECTION, SOLUTION INTRAMUSCULAR; INTRAVENOUS
Status: DISCONTINUED | OUTPATIENT
Start: 2019-05-25 | End: 2019-05-26 | Stop reason: HOSPADM

## 2019-05-25 RX ADMIN — ONDANSETRON HYDROCHLORIDE 4 MG: 2 SOLUTION INTRAMUSCULAR; INTRAVENOUS at 04:44

## 2019-05-25 RX ADMIN — METOPROLOL SUCCINATE 50 MG: 50 TABLET, EXTENDED RELEASE ORAL at 08:34

## 2019-05-25 RX ADMIN — HYDROCHLOROTHIAZIDE 12.5 MG: 12.5 TABLET ORAL at 08:34

## 2019-05-25 RX ADMIN — ALLOPURINOL 100 MG: 100 TABLET ORAL at 08:34

## 2019-05-25 RX ADMIN — Medication 10 ML: at 21:19

## 2019-05-25 RX ADMIN — CLONIDINE HYDROCHLORIDE 0.2 MG: 0.2 TABLET ORAL at 21:17

## 2019-05-25 RX ADMIN — AMLODIPINE BESYLATE 10 MG: 10 TABLET ORAL at 06:42

## 2019-05-25 RX ADMIN — ALBUTEROL SULFATE 2 PUFF: 90 AEROSOL, METERED RESPIRATORY (INHALATION) at 21:19

## 2019-05-25 RX ADMIN — LOSARTAN POTASSIUM 100 MG: 50 TABLET, FILM COATED ORAL at 08:34

## 2019-05-25 RX ADMIN — PANTOPRAZOLE SODIUM 40 MG: 40 TABLET, DELAYED RELEASE ORAL at 11:22

## 2019-05-25 RX ADMIN — OXYCODONE HYDROCHLORIDE AND ACETAMINOPHEN 2 TABLET: 5; 325 TABLET ORAL at 23:59

## 2019-05-25 RX ADMIN — ALLOPURINOL 100 MG: 100 TABLET ORAL at 21:17

## 2019-05-25 RX ADMIN — OXYCODONE HYDROCHLORIDE AND ACETAMINOPHEN 2 TABLET: 5; 325 TABLET ORAL at 11:20

## 2019-05-25 RX ADMIN — SODIUM CHLORIDE, POTASSIUM CHLORIDE, SODIUM LACTATE AND CALCIUM CHLORIDE: 600; 310; 30; 20 INJECTION, SOLUTION INTRAVENOUS at 01:28

## 2019-05-25 RX ADMIN — ASPIRIN 81 MG: 81 TABLET ORAL at 08:34

## 2019-05-25 RX ADMIN — HYDRALAZINE HYDROCHLORIDE 10 MG: 20 INJECTION, SOLUTION INTRAMUSCULAR; INTRAVENOUS at 04:44

## 2019-05-25 RX ADMIN — HYDRALAZINE HYDROCHLORIDE 10 MG: 20 INJECTION, SOLUTION INTRAMUSCULAR; INTRAVENOUS at 15:41

## 2019-05-25 RX ADMIN — OXYCODONE HYDROCHLORIDE AND ACETAMINOPHEN 2 TABLET: 5; 325 TABLET ORAL at 15:41

## 2019-05-25 RX ADMIN — OXYCODONE HYDROCHLORIDE AND ACETAMINOPHEN 2 TABLET: 5; 325 TABLET ORAL at 19:28

## 2019-05-25 RX ADMIN — ALBUTEROL SULFATE 2 PUFF: 90 AEROSOL, METERED RESPIRATORY (INHALATION) at 09:00

## 2019-05-25 RX ADMIN — MORPHINE SULFATE 4 MG: 4 INJECTION INTRAVENOUS at 01:28

## 2019-05-25 RX ADMIN — MORPHINE SULFATE 4 MG: 4 INJECTION INTRAVENOUS at 08:34

## 2019-05-25 RX ADMIN — MORPHINE SULFATE 4 MG: 4 INJECTION INTRAVENOUS at 04:44

## 2019-05-25 RX ADMIN — CLONIDINE HYDROCHLORIDE 0.2 MG: 0.2 TABLET ORAL at 08:34

## 2019-05-25 RX ADMIN — ATORVASTATIN CALCIUM 10 MG: 10 TABLET, FILM COATED ORAL at 21:17

## 2019-05-25 RX ADMIN — Medication 10 ML: at 15:41

## 2019-05-25 ASSESSMENT — PAIN - FUNCTIONAL ASSESSMENT
PAIN_FUNCTIONAL_ASSESSMENT: ACTIVITIES ARE NOT PREVENTED

## 2019-05-25 ASSESSMENT — PAIN SCALES - GENERAL
PAINLEVEL_OUTOF10: 0
PAINLEVEL_OUTOF10: 7
PAINLEVEL_OUTOF10: 8
PAINLEVEL_OUTOF10: 8
PAINLEVEL_OUTOF10: 3
PAINLEVEL_OUTOF10: 8
PAINLEVEL_OUTOF10: 0
PAINLEVEL_OUTOF10: 4
PAINLEVEL_OUTOF10: 8
PAINLEVEL_OUTOF10: 5
PAINLEVEL_OUTOF10: 0

## 2019-05-25 ASSESSMENT — PAIN DESCRIPTION - LOCATION
LOCATION: ABDOMEN

## 2019-05-25 ASSESSMENT — PAIN DESCRIPTION - PAIN TYPE
TYPE: SURGICAL PAIN

## 2019-05-25 ASSESSMENT — PAIN DESCRIPTION - FREQUENCY
FREQUENCY: INTERMITTENT
FREQUENCY: CONTINUOUS
FREQUENCY: CONTINUOUS

## 2019-05-25 ASSESSMENT — PAIN DESCRIPTION - DESCRIPTORS
DESCRIPTORS: ACHING;DISCOMFORT;SORE
DESCRIPTORS: DISCOMFORT;CONSTANT
DESCRIPTORS: ACHING;DISCOMFORT;SORE
DESCRIPTORS: CONSTANT;DISCOMFORT
DESCRIPTORS: ACHING;DISCOMFORT;SORE
DESCRIPTORS: CONSTANT;DISCOMFORT

## 2019-05-25 ASSESSMENT — PAIN DESCRIPTION - PROGRESSION
CLINICAL_PROGRESSION: GRADUALLY WORSENING

## 2019-05-25 ASSESSMENT — PAIN DESCRIPTION - ORIENTATION
ORIENTATION: MID

## 2019-05-25 ASSESSMENT — PAIN DESCRIPTION - ONSET
ONSET: GRADUAL
ONSET: GRADUAL

## 2019-05-25 NOTE — PROGRESS NOTES
Called about patient being transferred to 6500 after RRT on 5400. I was contacted prior to RRT for elevated BP for which I ordered PRN medications. Per nurse prior to giving second does of PRN there was a RRT called for hypertension and tachycardia. Patient was given his home clonidine by RRT team and his BP improved into the 140s and tachycardia came down to 103. He had no neurological deficits and states that he was in 8/10 pain but was avoiding pain meds. Pain meds were given and patient was comfortable on reexamination.      Electronically signed by Ira Avila MD on 5/24/19 at 11:59 PM

## 2019-05-25 NOTE — SIGNIFICANT EVENT
Rapid Response Team Note  Date of event: 5/24/2019   Time of event: 2116  John Rogers. 79y.o. year old male   YOB: 1950   Admit date:  5/24/2019   [facility]   Witnessed? : [x]Yes  [] No  Monitored? : [x]Yes  [] No  Code status: [x] Full  [] DNR-CCA  []DNR-CC  ______________________________________________________________________  Reason for RRT:    [] RR < 8     [] RR > 28   [] SpO2 <90%   [] HR < 40 bpm   [] HR > 130 bpm  [] SBP < 90 mmHg    [] SpO2 <90%   [] LOC   [] Seizures    [] Significant Bleeding Event    [x] Other:     Subjective: The patient is 79years old male with PMH of CAD s/p PCI to   left circumflex in 2012,HTN and GERD admitted for hiatal hernia repar. He did not get his BP meds. RRT was called esau to hypertension.    Objective:   Vital signs: Temp: 98.5/BP: 200/107/RR: 24/HR: 24  Initial Condition:  Conscious   [x] Yes  [] No     Breathing [x] Yes  [] No     Pulse  [x] Yes  [] No    Airway:   [] Open/ Clear     Intervention: [] None  [] Pooled secretions     [] Suctioned  [] Stridor      [] Intubation    Lungs:   [] Symmetrical chest rise/ CTABL Intervention: [] None  [] Use of accessory muscles    [] NIV (CPAP/BiPAP)  [] Cyanosis      [] Nasal Oxygen/Mask  [] Wheezing       [] ABG             [] CXR  [] Other:     Circulation:   Rhythm:  [] Sinus [] Other:  Intervention: [] None            [] IV Access  [] Peripheral              [] Central            [] EKG            [] Cardioversion            [] Defibrillation     Capillary Refill:  [] > 2 seconds [] < 2 seconds    Neurologic:   [] NIHSS      [] Pupillary Response:     Response to pain:   [] Yes  [] No  Follow commands:  [] Yes  [] No  Facial asymmetry:  [] Yes  [] No  Motor strength:  [] Equal  [] Focal deficit:   Diagnostic Test:  Blood Sugar:  140 mg/dL  EKG:      ABG:    No results found for: PH, PCO2, PO2, HCO3, O2SAT  Medication(s) Given:  []  Narcan (Naloxone)   []  Romazicon

## 2019-05-25 NOTE — PROGRESS NOTES
Patient transferred to [phone] in stable condition with this RN, MD, and ICU RN at bedside. Cardiac monitor in place. Nurse to nurse hand off given, all questions answered.

## 2019-05-25 NOTE — DISCHARGE SUMMARY
Physician Discharge Summary     Patient ID:  Tasha Soto  [id number]  79 y.o.  1950    Admit date: 5/24/2019    Discharge date and time: No discharge date for patient encounter. Admitting Physician: Lele Ferrara MD     Admission Diagnoses: S/P laparoscopic fundoplication [A12.346]    Discharge Diagnoses: Active Problems:    S/P laparoscopic fundoplication  Resolved Problems:    * No resolved hospital problems. *      Admission Condition: stable    Discharged Condition: stable    Indication for Admission: status post laparoscopic hiatal hernia repair and toupet fundoplication    Hospital Course/Procedures/Operation/treatments:   5/24: underwent laparoscopic hiatal hernia repair and toupet fundoplication  9/31: RRT last night for hypertension, improved with home clonidine/prns/pain meds. Some nausea overnight. Has taken minimal clears so far.  5/26: Bloating and gas pains overnight. Ambulated followed by passing of flatus. No bowel movement. Tolerating CLD. Advance to FLD. Consults:   None    Significant Diagnostic Studies:   No results found. Discharge Exam:  General: NAD, awake and alert. A&Ox3  Head: Normocephalic, atraumatic  Eyes: PERRLA, EOMI. No sclera icterus. Lungs: No increased work of breathing. CTAB. No W/R/R. Cardiovascular: RRR. Abdomen: Soft, ND, NT. +BS. No rebound, guarding or rigidity. Extremities: Atraumatic, full range of motion  Skin: Warm, dry and intact        Disposition: home    In process/preliminary results:  Outstanding Order Results     Date and Time Order Name Status Description    5/25/2019 4918 Basic Metabolic Panel w/ Reflex to MG In process     5/24/2019 2133 EKG 12 Lead Preliminary           Patient Instructions:   Current Discharge Medication List           Details   oxyCODONE-acetaminophen (PERCOCET) 5-325 MG per tablet Take 1 tablet by mouth every 6 hours as needed for Pain for up to 3 days. Intended supply: 3 days.  Take lowest dose possible to manage pain  Qty: 20 tablet, Refills: 0    Comments: Reduce doses taken as pain becomes manageable  Associated Diagnoses: S/P laparoscopic fundoplication              Details   atorvastatin (LIPITOR) 10 MG tablet take 1 tablet by mouth at bedtime  Qty: 90 tablet, Refills: 3    Associated Diagnoses: Essential hypertension      pantoprazole (PROTONIX) 40 MG tablet Take 1 tablet by mouth daily  Qty: 90 tablet, Refills: 0    Associated Diagnoses: Gastroesophageal reflux disease, esophagitis presence not specified      amLODIPine (NORVASC) 10 MG tablet Take 1 tablet by mouth every morning (before breakfast)  Qty: 90 tablet, Refills: 0    Associated Diagnoses: Essential hypertension      hydrochlorothiazide (HYDRODIURIL) 12.5 MG tablet Take 1 tablet by mouth daily  Qty: 90 tablet, Refills: 0    Associated Diagnoses: Essential hypertension      metoprolol succinate (TOPROL XL) 50 MG extended release tablet Take 1 tablet by mouth every morning  Qty: 90 tablet, Refills: 0    Associated Diagnoses: Essential hypertension      cloNIDine (CATAPRES) 0.2 MG tablet Take 1 tablet by mouth 2 times daily  Qty: 180 tablet, Refills: 0    Associated Diagnoses: Essential hypertension      allopurinol (ZYLOPRIM) 100 MG tablet Take 1 tablet by mouth 2 times daily  Qty: 180 tablet, Refills: 0    Associated Diagnoses: History of gout      losartan (COZAAR) 100 MG tablet Take 1 tablet by mouth every morning  Qty: 90 tablet, Refills: 1    Associated Diagnoses: Essential hypertension      famotidine (PEPCID) 20 MG tablet Take 1 tablet by mouth 2 times daily as needed (acid reflux)  Qty: 60 tablet, Refills: 3    Associated Diagnoses: Gastroesophageal reflux disease, esophagitis presence not specified      albuterol sulfate HFA (PROVENTIL HFA) 108 (90 Base) MCG/ACT inhaler Inhale 2 puffs into the lungs 4 times daily  Qty: 1 Inhaler, Refills: 2    Associated Diagnoses: Bronchitis      Dextromethorphan-Guaifenesin (MUCINEX DM MAXIMUM STRENGTH)  MG TB12 Take 1 tablet by mouth 2 times daily  Qty: 28 tablet, Refills: 0    Associated Diagnoses: Viral URI      aspirin 81 MG EC tablet Take 1 tablet by mouth daily  Qty: 90 tablet, Refills: 3      naproxen (NAPROXEN) 500 MG EC tablet Take 1 tablet by mouth 2 times daily (with meals)  Qty: 60 tablet, Refills: 3    Associated Diagnoses: Osteoarthritis of right shoulder, unspecified osteoarthritis type      sildenafil (VIAGRA) 100 MG tablet Take 1 tablet by mouth as needed for Erectile Dysfunction  Qty: 10 tablet, Refills: 5    Associated Diagnoses: Drug-induced erectile dysfunction      sodium chloride (OCEAN) 0.65 % nasal spray 1 spray by Nasal route as needed for Congestion  Qty: 1 Bottle, Refills: 0      NARCAN 4 MG/0.1ML LIQD nasal spray instill 1 spray in 1 NOSTRIL if needed for opioid overdose may re. ..  (REFER TO PRESCRIPTION NOTES). Refills: 0               Follow up:    Tammie Villegas MD  [address] [place] [address],[address], [facility]  [phone]    Schedule an appointment as soon as possible for a visit in 2 weeks         Signed:  Mohit Cintron MD  5/25/2019  6:39 AM

## 2019-05-25 NOTE — PROGRESS NOTES
[facility]  ATTENDING PHYSICIAN SURGERY PROGRESS NOTE     I have examined the patient, reviewed the record, and discussed the case with the APN/  Resident. I have reviewed all relevant labs and imaging data. The following summarizes my clinical findings and independent assessment. No chief complaint on file. S: Patient doing well this am. Minimal pain. Tolerating clears. HTN overnight leading to RRT . Did not take his am HTN meds and was holding off on pain meds     O:  Physical Exam   Constitutional: He is oriented to person, place, and time. He appears well-developed and well-nourished. HENT:   Head: Atraumatic. Eyes: EOM are normal.   Neck: Neck supple. Cardiovascular: Normal rate. Pulmonary/Chest: Effort normal. No respiratory distress. Abdominal: Soft. He exhibits no distension. Appropriately tender, incisions c/d/i    Musculoskeletal: Normal range of motion. Neurological: He is alert and oriented to person, place, and time. Skin: Skin is warm. Assessment   Active Problems:    S/P laparoscopic fundoplication  Resolved Problems:    * No resolved hospital problems.  *      Plan   Clears  Pain control   Hep lock   OT/PT    Aggressive pulmonary hygiene   No indication for abx   No indication for transfusion   PCDs, Lovenox   PIV  Hx HTN-Home medications   PPI   No glycemic issues   Home medications     Dispo Telemetry - possibly home on fulls tomorrow     Nati Bernard MD

## 2019-05-25 NOTE — PROGRESS NOTES
Patient's BP manually 200/102 with HR of 105. PRN labetalol administered. Patient thrashing in the bed, dry heaving and complaining of nausea. Stating, \"I don't feel good. Something isn't right. \" This RN notified the charge nurse to message [facility] to come see patient and update him on his current condition. Will continue to monitor.

## 2019-05-26 VITALS
SYSTOLIC BLOOD PRESSURE: 116 MMHG | RESPIRATION RATE: 18 BRPM | HEART RATE: 81 BPM | OXYGEN SATURATION: 94 % | WEIGHT: 217.8 LBS | BODY MASS INDEX: 29.5 KG/M2 | TEMPERATURE: 97.9 F | HEIGHT: 72 IN | DIASTOLIC BLOOD PRESSURE: 61 MMHG

## 2019-05-26 PROCEDURE — 2580000003 HC RX 258: Performed by: STUDENT IN AN ORGANIZED HEALTH CARE EDUCATION/TRAINING PROGRAM

## 2019-05-26 PROCEDURE — 6360000002 HC RX W HCPCS: Performed by: STUDENT IN AN ORGANIZED HEALTH CARE EDUCATION/TRAINING PROGRAM

## 2019-05-26 PROCEDURE — 99024 POSTOP FOLLOW-UP VISIT: CPT | Performed by: SURGERY

## 2019-05-26 PROCEDURE — 6370000000 HC RX 637 (ALT 250 FOR IP): Performed by: STUDENT IN AN ORGANIZED HEALTH CARE EDUCATION/TRAINING PROGRAM

## 2019-05-26 RX ORDER — BISACODYL 10 MG
10 SUPPOSITORY, RECTAL RECTAL ONCE
Status: DISCONTINUED | OUTPATIENT
Start: 2019-05-26 | End: 2019-05-26 | Stop reason: HOSPADM

## 2019-05-26 RX ADMIN — METOPROLOL SUCCINATE 50 MG: 50 TABLET, EXTENDED RELEASE ORAL at 08:14

## 2019-05-26 RX ADMIN — AMLODIPINE BESYLATE 10 MG: 10 TABLET ORAL at 06:27

## 2019-05-26 RX ADMIN — PANTOPRAZOLE SODIUM 40 MG: 40 TABLET, DELAYED RELEASE ORAL at 08:13

## 2019-05-26 RX ADMIN — HYDRALAZINE HYDROCHLORIDE 10 MG: 20 INJECTION, SOLUTION INTRAMUSCULAR; INTRAVENOUS at 04:33

## 2019-05-26 RX ADMIN — LOSARTAN POTASSIUM 100 MG: 50 TABLET, FILM COATED ORAL at 08:14

## 2019-05-26 RX ADMIN — Medication 10 ML: at 08:17

## 2019-05-26 RX ADMIN — ASPIRIN 81 MG: 81 TABLET ORAL at 08:14

## 2019-05-26 RX ADMIN — HYDROCHLOROTHIAZIDE 12.5 MG: 12.5 TABLET ORAL at 08:14

## 2019-05-26 RX ADMIN — CLONIDINE HYDROCHLORIDE 0.2 MG: 0.2 TABLET ORAL at 08:14

## 2019-05-26 RX ADMIN — ALLOPURINOL 100 MG: 100 TABLET ORAL at 08:14

## 2019-05-26 RX ADMIN — OXYCODONE HYDROCHLORIDE AND ACETAMINOPHEN 2 TABLET: 5; 325 TABLET ORAL at 08:14

## 2019-05-26 ASSESSMENT — PAIN DESCRIPTION - LOCATION
LOCATION: ABDOMEN
LOCATION: ABDOMEN

## 2019-05-26 ASSESSMENT — PAIN SCALES - GENERAL
PAINLEVEL_OUTOF10: 2
PAINLEVEL_OUTOF10: 7
PAINLEVEL_OUTOF10: 0
PAINLEVEL_OUTOF10: 8
PAINLEVEL_OUTOF10: 0
PAINLEVEL_OUTOF10: 0
PAINLEVEL_OUTOF10: 3

## 2019-05-26 ASSESSMENT — PAIN DESCRIPTION - PROGRESSION: CLINICAL_PROGRESSION: GRADUALLY WORSENING

## 2019-05-26 ASSESSMENT — PAIN DESCRIPTION - DESCRIPTORS
DESCRIPTORS: CONSTANT;DISCOMFORT
DESCRIPTORS: ACHING;DISCOMFORT;SORE;TENDER

## 2019-05-26 ASSESSMENT — PAIN DESCRIPTION - FREQUENCY: FREQUENCY: INTERMITTENT

## 2019-05-26 ASSESSMENT — PAIN DESCRIPTION - PAIN TYPE
TYPE: SURGICAL PAIN
TYPE: SURGICAL PAIN

## 2019-05-26 ASSESSMENT — PAIN - FUNCTIONAL ASSESSMENT
PAIN_FUNCTIONAL_ASSESSMENT: ACTIVITIES ARE NOT PREVENTED
PAIN_FUNCTIONAL_ASSESSMENT: ACTIVITIES ARE NOT PREVENTED

## 2019-05-26 ASSESSMENT — PAIN DESCRIPTION - ORIENTATION: ORIENTATION: MID

## 2019-05-26 ASSESSMENT — PAIN DESCRIPTION - ONSET: ONSET: GRADUAL

## 2019-05-26 NOTE — PROGRESS NOTES
[facility]  ATTENDING PHYSICIAN SURGERY PROGRESS NOTE     I have examined the patient, reviewed the record, and discussed the case with the APN/  Resident. I have reviewed all relevant labs and imaging data. The following summarizes my clinical findings and independent assessment. CC: Post op     S: Patient doing well this am. Minimal pain. Tolerating clears. Passing gas no BM . Gas pains overnight     O:  Physical Exam   Constitutional: He is oriented to person, place, and time. He appears well-developed and well-nourished. HENT:   Head: Atraumatic. Eyes: EOM are normal.   Neck: Neck supple. Cardiovascular: Normal rate. Pulmonary/Chest: Effort normal. No respiratory distress. Abdominal: Soft. He exhibits no distension. Appropriately tender, incisions c/d/i    Musculoskeletal: Normal range of motion. Neurological: He is alert and oriented to person, place, and time. Skin: Skin is warm. Assessment   Active Problems:    S/P laparoscopic fundoplication  Resolved Problems:    * No resolved hospital problems. *      Plan   Advance to Fulls and DC on fulls   Pain control   Hep lock   OT/PT    Aggressive pulmonary hygiene   No indication for abx   No indication for transfusion   PCDs, Lovenox   PIV  Hx HTN-Home medications   PPI   No glycemic issues   Home medications     Dispo Telemetry - DC home today if tolerating fulls.      Harshal Matthews MD

## 2019-05-29 ENCOUNTER — OFFICE VISIT (OUTPATIENT)
Dept: SURGERY | Age: 69
End: 2019-05-29
Payer: MEDICARE

## 2019-05-29 VITALS
WEIGHT: 213 LBS | TEMPERATURE: 98 F | RESPIRATION RATE: 16 BRPM | BODY MASS INDEX: 28.85 KG/M2 | OXYGEN SATURATION: 96 % | HEIGHT: 72 IN | SYSTOLIC BLOOD PRESSURE: 138 MMHG | HEART RATE: 88 BPM | DIASTOLIC BLOOD PRESSURE: 93 MMHG

## 2019-05-29 DIAGNOSIS — K21.00 HIATAL HERNIA WITH GASTROESOPHAGEAL REFLUX DISEASE AND ESOPHAGITIS: ICD-10-CM

## 2019-05-29 DIAGNOSIS — K44.9 HIATAL HERNIA WITH GASTROESOPHAGEAL REFLUX DISEASE AND ESOPHAGITIS: ICD-10-CM

## 2019-05-29 PROCEDURE — 99024 POSTOP FOLLOW-UP VISIT: CPT | Performed by: SURGERY

## 2019-05-29 PROCEDURE — 99211 OFF/OP EST MAY X REQ PHY/QHP: CPT | Performed by: SURGERY

## 2019-05-29 NOTE — PROGRESS NOTES
Subjective:      Patient ID: Mango Barros is a 79 y.o. male. HPI  Post hiatal hernia repair with posterior fundoplication. Doing well, no dysphagia, no reflux. Sore abdominal wall    Review of Systems    Objective:   Physical Exam  All wounds clean and dry,  Assessment:      Doing well post op hiatal hernia repair.       Plan:        Return in 2 to 3 weeks     Duncan Doss MD

## 2019-06-10 ENCOUNTER — OFFICE VISIT (OUTPATIENT)
Dept: FAMILY MEDICINE CLINIC | Age: 69
End: 2019-06-10
Payer: MEDICARE

## 2019-06-10 ENCOUNTER — HOSPITAL ENCOUNTER (OUTPATIENT)
Age: 69
Discharge: HOME | End: 2019-06-12
Payer: MEDICARE

## 2019-06-10 VITALS
BODY MASS INDEX: 28.71 KG/M2 | WEIGHT: 212 LBS | HEIGHT: 72 IN | DIASTOLIC BLOOD PRESSURE: 86 MMHG | OXYGEN SATURATION: 94 % | HEART RATE: 75 BPM | TEMPERATURE: 97.9 F | RESPIRATION RATE: 18 BRPM | SYSTOLIC BLOOD PRESSURE: 136 MMHG

## 2019-06-10 DIAGNOSIS — G47.9 SLEEP DISTURBANCE: ICD-10-CM

## 2019-06-10 DIAGNOSIS — R79.9 ABNORMAL FINDING OF BLOOD CHEMISTRY: ICD-10-CM

## 2019-06-10 DIAGNOSIS — D72.829 LEUKOCYTOSIS, UNSPECIFIED TYPE: ICD-10-CM

## 2019-06-10 DIAGNOSIS — Z13.1 SCREENING FOR DIABETES MELLITUS: ICD-10-CM

## 2019-06-10 DIAGNOSIS — Z13.1 SCREENING FOR DIABETES MELLITUS: Primary | ICD-10-CM

## 2019-06-10 DIAGNOSIS — R35.0 URINARY FREQUENCY: ICD-10-CM

## 2019-06-10 DIAGNOSIS — K59.00 CONSTIPATION, UNSPECIFIED CONSTIPATION TYPE: ICD-10-CM

## 2019-06-10 LAB
BILIRUBIN, POC: NEGATIVE
BLOOD URINE, POC: NEGATIVE
CLARITY, POC: CLEAR
COLOR, POC: NORMAL
GLUCOSE URINE, POC: NEGATIVE
HBA1C MFR BLD: 5.8 % (ref 4–5.6)
HCT VFR BLD CALC: 41 % (ref 37–54)
HEMOGLOBIN: 13.7 G/DL (ref 12.5–16.5)
KETONES, POC: NEGATIVE
LEUKOCYTE EST, POC: NEGATIVE
MCH RBC QN AUTO: 30.8 PG (ref 26–35)
MCHC RBC AUTO-ENTMCNC: 33.4 % (ref 32–34.5)
MCV RBC AUTO: 92.1 FL (ref 80–99.9)
NITRITE, POC: NEGATIVE
PDW BLD-RTO: 13.7 FL (ref 11.5–15)
PH, POC: 6.5
PLATELET # BLD: 352 E9/L (ref 130–450)
PMV BLD AUTO: 9.8 FL (ref 7–12)
PROTEIN, POC: NEGATIVE
RBC # BLD: 4.45 E12/L (ref 3.8–5.8)
SPECIFIC GRAVITY, POC: 1.01
UROBILINOGEN, POC: 0.2
WBC # BLD: 8.3 E9/L (ref 4.5–11.5)

## 2019-06-10 PROCEDURE — 36415 COLL VENOUS BLD VENIPUNCTURE: CPT | Performed by: FAMILY MEDICINE

## 2019-06-10 PROCEDURE — 99213 OFFICE O/P EST LOW 20 MIN: CPT | Performed by: STUDENT IN AN ORGANIZED HEALTH CARE EDUCATION/TRAINING PROGRAM

## 2019-06-10 PROCEDURE — 3017F COLORECTAL CA SCREEN DOC REV: CPT | Performed by: STUDENT IN AN ORGANIZED HEALTH CARE EDUCATION/TRAINING PROGRAM

## 2019-06-10 PROCEDURE — G8417 CALC BMI ABV UP PARAM F/U: HCPCS | Performed by: STUDENT IN AN ORGANIZED HEALTH CARE EDUCATION/TRAINING PROGRAM

## 2019-06-10 PROCEDURE — 4040F PNEUMOC VAC/ADMIN/RCVD: CPT | Performed by: STUDENT IN AN ORGANIZED HEALTH CARE EDUCATION/TRAINING PROGRAM

## 2019-06-10 PROCEDURE — G8427 DOCREV CUR MEDS BY ELIG CLIN: HCPCS | Performed by: STUDENT IN AN ORGANIZED HEALTH CARE EDUCATION/TRAINING PROGRAM

## 2019-06-10 PROCEDURE — 1111F DSCHRG MED/CURRENT MED MERGE: CPT | Performed by: STUDENT IN AN ORGANIZED HEALTH CARE EDUCATION/TRAINING PROGRAM

## 2019-06-10 PROCEDURE — 85027 COMPLETE CBC AUTOMATED: CPT

## 2019-06-10 PROCEDURE — 36415 COLL VENOUS BLD VENIPUNCTURE: CPT

## 2019-06-10 PROCEDURE — 83036 HEMOGLOBIN GLYCOSYLATED A1C: CPT

## 2019-06-10 PROCEDURE — 81002 URINALYSIS NONAUTO W/O SCOPE: CPT | Performed by: STUDENT IN AN ORGANIZED HEALTH CARE EDUCATION/TRAINING PROGRAM

## 2019-06-10 PROCEDURE — G8598 ASA/ANTIPLAT THER USED: HCPCS | Performed by: STUDENT IN AN ORGANIZED HEALTH CARE EDUCATION/TRAINING PROGRAM

## 2019-06-10 PROCEDURE — 4004F PT TOBACCO SCREEN RCVD TLK: CPT | Performed by: STUDENT IN AN ORGANIZED HEALTH CARE EDUCATION/TRAINING PROGRAM

## 2019-06-10 PROCEDURE — 99212 OFFICE O/P EST SF 10 MIN: CPT | Performed by: STUDENT IN AN ORGANIZED HEALTH CARE EDUCATION/TRAINING PROGRAM

## 2019-06-10 PROCEDURE — 1123F ACP DISCUSS/DSCN MKR DOCD: CPT | Performed by: STUDENT IN AN ORGANIZED HEALTH CARE EDUCATION/TRAINING PROGRAM

## 2019-06-10 RX ORDER — SENNA AND DOCUSATE SODIUM 50; 8.6 MG/1; MG/1
1 TABLET, FILM COATED ORAL DAILY
Qty: 28 TABLET | Refills: 0 | Status: SHIPPED | OUTPATIENT
Start: 2019-06-10 | End: 2019-08-26

## 2019-06-10 ASSESSMENT — PATIENT HEALTH QUESTIONNAIRE - PHQ9: DEPRESSION UNABLE TO ASSESS: PT REFUSES

## 2019-06-10 NOTE — PATIENT INSTRUCTIONS
Patient Education   Patient Education      Increase water and fiber in your diet. Keep a sleep diary for the next week. Constipation: Care Instructions  Your Care Instructions    Constipation means that you have a hard time passing stools (bowel movements). People pass stools from 3 times a day to once every 3 days. What is normal for you may be different. Constipation may occur with pain in the rectum and cramping. The pain may get worse when you try to pass stools. Sometimes there are small amounts of bright red blood on toilet paper or the surface of stools. This is because of enlarged veins near the rectum (hemorrhoids). A few changes in your diet and lifestyle may help you avoid ongoing constipation. Your doctor may also prescribe medicine to help loosen your stool. Some medicines can cause constipation. These include pain medicines and antidepressants. Tell your doctor about all the medicines you take. Your doctor may want to make a medicine change to ease your symptoms. Follow-up care is a key part of your treatment and safety. Be sure to make and go to all appointments, and call your doctor if you are having problems. It's also a good idea to know your test results and keep a list of the medicines you take. How can you care for yourself at home? · Drink plenty of fluids, enough so that your urine is light yellow or clear like water. If you have kidney, heart, or liver disease and have to limit fluids, talk with your doctor before you increase the amount of fluids you drink. · Include high-fiber foods in your diet each day. These include fruits, vegetables, beans, and whole grains. · Get at least 30 minutes of exercise on most days of the week. Walking is a good choice. You also may want to do other activities, such as running, swimming, cycling, or playing tennis or team sports. · Take a fiber supplement, such as Citrucel or Metamucil, every day.  Read and follow all instructions on the during sleep. You can treat sleep apnea at home by making lifestyle changes. You also can use a CPAP breathing machine that keeps tissues in the throat from blocking your airway. Or your doctor may suggest that you use a breathing device while you sleep. It helps keep your airway open. This could be a device that you put in your mouth. In some cases, surgery may be needed to remove enlarged tissues in the throat. Follow-up care is a key part of your treatment and safety. Be sure to make and go to all appointments, and call your doctor if you are having problems. It's also a good idea to know your test results and keep a list of the medicines you take. How can you care for yourself at home? · Lose weight, if needed. It may reduce the number of times you stop breathing or have slowed breathing. · Sleep on your side. It may stop mild apnea. If you tend to roll onto your back, sew a pocket in the back of your pajama top. Put a tennis ball into the pocket, and stitch the pocket shut. This will help keep you from sleeping on your back. · Avoid alcohol and medicines such as sleeping pills and sedatives before bed. · Do not smoke. Smoking can make sleep apnea worse. If you need help quitting, talk to your doctor about stop-smoking programs and medicines. These can increase your chances of quitting for good. · Prop up the head of your bed 4 to 6 inches by putting bricks under the legs of the bed. · Treat breathing problems, such as a stuffy nose, caused by a cold or allergies. · Try a continuous positive airway pressure (CPAP) breathing machine if your doctor recommends it. The machine keeps your airway open when you sleep. · If CPAP does not work for you, ask your doctor if you can try other breathing machines. A bilevel positive airway pressure machine uses one type of air pressure for breathing in and another type for breathing out.  Another device raises or lowers air pressure as needed while you breathe. · Talk to your doctor if:  ? Your nose feels dry or bleeds when you use one of these machines. You may need to increase moisture in the air. A humidifier may help. ? Your nose is runny or stuffy from using a breathing machine. Decongestants or a corticosteroid nasal spray may help. ? You are sleepy during the day and it gets in the way of the normal things you do. Do not drive when you are drowsy. When should you call for help? Watch closely for changes in your health, and be sure to contact your doctor if:    · You still have sleep apnea even though you have made lifestyle changes.     · You are thinking of trying a device such as CPAP.     · You are having problems using a CPAP or similar machine. Where can you learn more? Go to https://[employer]. org and sign in to your [employer] account. Enter C566 in the [employer] box to learn more about \"Sleep Apnea: Care Instructions. \"     If you do not have an account, please click on the \"Sign Up Now\" link. Current as of: September 5, 2018  Content Version: 12.0  © 1801-9725 [employer]. Care instructions adapted under license by [facility] ([facility]). If you have questions about a medical condition or this instruction, always ask your healthcare professional. [address] any warranty or liability for your use of this information.

## 2019-06-10 NOTE — PROGRESS NOTES
6/10/2019     Chastity [address] [place] [address] (:  1950) is a 81 y.o. male, here for evaluation of the following medical concerns:    HPI  Mr. Edna Aparicio is here for follow up following hernia repair surgery. He is recovering well and reports his pain is manageable and progressively improving. Denies fever or chills. Endorses passing flatus and has a hard BM every two to three days. Does not drink a lot of water and little/no fiber in his diet. Also with complaints of polyuria and nocturia. Denies hesitancy, decreased flow or quantity of urine. 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 Denies fever, chills or suprapubic pain. Denies nausea, vomiting, weight changes. Fasting blood sugars were elevated during his post-operative stay. Complaints of unfulfilling sleep. Reports he tries to sleep around 8 pm and he'll fall asleep within 30 minutes. However, on most nights of the week he awaks after a few hours to either urinate or he is hungry for a snack. He reports morning headaches and reports sleepiness in the day time. He cannot speak to if he snores or not. Review of Systems   Constitutional: Negative for activity change, fatigue, fever and unexpected weight change. HENT: Negative for congestion, rhinorrhea and tinnitus. Eyes: Negative for pain, itching and visual disturbance. Respiratory: Negative for cough, chest tightness, shortness of breath and wheezing. Cardiovascular: Negative for chest pain, palpitations and leg swelling. Gastrointestinal: Positive for abdominal pain and constipation. Negative for abdominal distention, blood in stool, diarrhea, nausea and vomiting. Endocrine: Positive for polyuria. Negative for cold intolerance, heat intolerance and polydipsia. Genitourinary: Positive for frequency. Negative for decreased urine volume, difficulty urinating, dysuria, flank pain, hematuria and urgency.    Musculoskeletal: Negative for arthralgias, back pain, gait problem and myalgias. Skin: Negative for color change, pallor, rash and wound. Neurological: Negative for dizziness, weakness, light-headedness and headaches. Psychiatric/Behavioral: Positive for sleep disturbance. Negative for agitation and suicidal ideas. The patient is not nervous/anxious. Prior to Visit Medications    Medication Sig Taking? Authorizing Provider   sennosides-docusate sodium (SENOKOT-S) 8.6-50 MG tablet Take 1 tablet by mouth daily Yes Yolanda Graff MD   atorvastatin (LIPITOR) 10 MG tablet take 1 tablet by mouth at bedtime Yes Yolanda Graff MD   pantoprazole (PROTONIX) 40 MG tablet Take 1 tablet by mouth daily Yes Yolanda Graff MD   amLODIPine (NORVASC) 10 MG tablet Take 1 tablet by mouth every morning (before breakfast) Yes Yolanda Graff MD   hydrochlorothiazide (HYDRODIURIL) 12.5 MG tablet Take 1 tablet by mouth daily Yes Yolanda Graff MD   metoprolol succinate (TOPROL XL) 50 MG extended release tablet Take 1 tablet by mouth every morning Yes Yolanda Graff MD   cloNIDine (CATAPRES) 0.2 MG tablet Take 1 tablet by mouth 2 times daily Yes Yolanda Graff MD   allopurinol (ZYLOPRIM) 100 MG tablet Take 1 tablet by mouth 2 times daily Yes Yolanda Grfaf MD   [facility] 4 MG/0.1ML LIQD nasal spray instill 1 spray in 1 NOSTRIL if needed for opioid overdose may re. ..  (REFER TO PRESCRIPTION NOTES).  Yes Historical Provider, MD   losartan (COZAAR) 100 MG tablet Take 1 tablet by mouth every morning Yes Yolanda Graff MD   famotidine (PEPCID) 20 MG tablet Take 1 tablet by mouth 2 times daily as needed (acid reflux) Yes Yolanda Graff MD   albuterol sulfate HFA (PROVENTIL HFA) 108 (90 Base) MCG/ACT inhaler Inhale 2 puffs into the lungs 4 times daily Yes Yolanda Graff MD   Dextromethorphan-Guaifenesin (MUCINEX DM MAXIMUM STRENGTH)  MG TB12 Take 1 tablet by mouth 2 times daily  Patient taking differently: Take 1 tablet by mouth as needed  Yes He exhibits no distension. There is no tenderness. There is no rebound and no guarding. No hernia. 5 port sites with routine healing, non-tender to palpation. Abdomen is tense without distension. Bowel sounds normoactive. Musculoskeletal: He exhibits no edema, tenderness or deformity. Neurological: He is alert and oriented to person, place, and time. No cranial nerve deficit. Skin: Skin is warm and dry. No rash noted. He is not diaphoretic. No erythema. Vitals reviewed. ASSESSMENT/PLAN:  1. Screening for diabetes mellitus  - fasting blood sugars elevated post operatively  - polyuria and nocturia  - HEMOGLOBIN A1C; Future    2. Leukocytosis, unspecified type  - likely reactive  - CBC; Future    3. Sleep disturbance  - disturbed sleep with naps in the day  - Recommend sleep diary for the next month. - Baseline Diagnostic Sleep Study; Future    4. Urinary frequency  - rule out infection and DM  - POCT Urinalysis no Micro    5. Constipation, unspecified constipation type  - secondary to opiates and lack of hydration  - sennosides-docusate sodium (SENOKOT-S) 8.6-50 MG tablet; Take 1 tablet by mouth daily  Dispense: 28 tablet; Refill: 0      Return in about 3 months (around 9/10/2019). An electronic signature was used to authenticate this note.     --Yumiko Lew MD on 6/11/2019 at 2:15 PM

## 2019-06-11 ASSESSMENT — ENCOUNTER SYMPTOMS
EYE ITCHING: 0
CONSTIPATION: 1
ABDOMINAL DISTENTION: 0
BLOOD IN STOOL: 0
EYE PAIN: 0
RHINORRHEA: 0
SHORTNESS OF BREATH: 0
WHEEZING: 0
COUGH: 0
COLOR CHANGE: 0
CHEST TIGHTNESS: 0
VOMITING: 0
DIARRHEA: 0
BACK PAIN: 0
NAUSEA: 0
ABDOMINAL PAIN: 1

## 2019-06-26 ENCOUNTER — OFFICE VISIT (OUTPATIENT)
Dept: SURGERY | Age: 69
End: 2019-06-26
Payer: MEDICARE

## 2019-06-26 VITALS
HEIGHT: 72 IN | OXYGEN SATURATION: 97 % | BODY MASS INDEX: 28.99 KG/M2 | DIASTOLIC BLOOD PRESSURE: 84 MMHG | HEART RATE: 63 BPM | WEIGHT: 214 LBS | SYSTOLIC BLOOD PRESSURE: 137 MMHG

## 2019-06-26 DIAGNOSIS — K44.9 HIATAL HERNIA WITH GASTROESOPHAGEAL REFLUX DISEASE AND ESOPHAGITIS: ICD-10-CM

## 2019-06-26 DIAGNOSIS — K21.00 HIATAL HERNIA WITH GASTROESOPHAGEAL REFLUX DISEASE AND ESOPHAGITIS: ICD-10-CM

## 2019-06-26 PROCEDURE — 99211 OFF/OP EST MAY X REQ PHY/QHP: CPT | Performed by: SURGERY

## 2019-06-26 PROCEDURE — 99024 POSTOP FOLLOW-UP VISIT: CPT | Performed by: SURGERY

## 2019-07-09 DIAGNOSIS — Z87.39 HISTORY OF GOUT: ICD-10-CM

## 2019-07-09 RX ORDER — ALLOPURINOL 100 MG/1
100 TABLET ORAL 2 TIMES DAILY
Qty: 180 TABLET | Refills: 0 | Status: SHIPPED | OUTPATIENT
Start: 2019-07-09 | End: 2019-10-10 | Stop reason: SDUPTHER

## 2019-07-18 DIAGNOSIS — I10 ESSENTIAL HYPERTENSION: ICD-10-CM

## 2019-07-18 RX ORDER — CLONIDINE HYDROCHLORIDE 0.2 MG/1
0.2 TABLET ORAL 2 TIMES DAILY
Qty: 180 TABLET | Refills: 0 | Status: CANCELLED | OUTPATIENT
Start: 2019-07-18

## 2019-07-19 RX ORDER — CLONIDINE HYDROCHLORIDE 0.2 MG/1
0.2 TABLET ORAL 2 TIMES DAILY
Qty: 180 TABLET | Refills: 0 | Status: SHIPPED | OUTPATIENT
Start: 2019-07-19 | End: 2019-10-10 | Stop reason: SDUPTHER

## 2019-07-24 DIAGNOSIS — I10 ESSENTIAL HYPERTENSION: ICD-10-CM

## 2019-07-26 ENCOUNTER — TELEPHONE (OUTPATIENT)
Dept: FAMILY MEDICINE CLINIC | Age: 69
End: 2019-07-26

## 2019-07-26 RX ORDER — METOPROLOL SUCCINATE 50 MG/1
50 TABLET, EXTENDED RELEASE ORAL EVERY MORNING
Qty: 90 TABLET | Refills: 5 | Status: SHIPPED | OUTPATIENT
Start: 2019-07-26

## 2019-08-26 ENCOUNTER — OFFICE VISIT (OUTPATIENT)
Dept: FAMILY MEDICINE CLINIC | Age: 69
End: 2019-08-26
Payer: MEDICARE

## 2019-08-26 ENCOUNTER — HOSPITAL ENCOUNTER (OUTPATIENT)
Age: 69
Discharge: HOME | End: 2019-08-28
Payer: MEDICARE

## 2019-08-26 VITALS
HEIGHT: 72 IN | WEIGHT: 213 LBS | SYSTOLIC BLOOD PRESSURE: 122 MMHG | OXYGEN SATURATION: 98 % | RESPIRATION RATE: 16 BRPM | BODY MASS INDEX: 28.85 KG/M2 | TEMPERATURE: 98 F | DIASTOLIC BLOOD PRESSURE: 80 MMHG | HEART RATE: 86 BPM

## 2019-08-26 DIAGNOSIS — Z87.39 HISTORY OF GOUT: ICD-10-CM

## 2019-08-26 DIAGNOSIS — J40 BRONCHITIS: ICD-10-CM

## 2019-08-26 DIAGNOSIS — Z87.891 PERSONAL HISTORY OF TOBACCO USE: ICD-10-CM

## 2019-08-26 DIAGNOSIS — B37.0 THRUSH: ICD-10-CM

## 2019-08-26 DIAGNOSIS — R06.02 SHORTNESS OF BREATH: ICD-10-CM

## 2019-08-26 DIAGNOSIS — R05.9 COUGH: Primary | ICD-10-CM

## 2019-08-26 DIAGNOSIS — K21.9 GASTROESOPHAGEAL REFLUX DISEASE, ESOPHAGITIS PRESENCE NOT SPECIFIED: ICD-10-CM

## 2019-08-26 LAB — URIC ACID, SERUM: 4.9 MG/DL (ref 3.4–7)

## 2019-08-26 PROCEDURE — 36415 COLL VENOUS BLD VENIPUNCTURE: CPT | Performed by: FAMILY MEDICINE

## 2019-08-26 PROCEDURE — 3017F COLORECTAL CA SCREEN DOC REV: CPT | Performed by: STUDENT IN AN ORGANIZED HEALTH CARE EDUCATION/TRAINING PROGRAM

## 2019-08-26 PROCEDURE — G8598 ASA/ANTIPLAT THER USED: HCPCS | Performed by: STUDENT IN AN ORGANIZED HEALTH CARE EDUCATION/TRAINING PROGRAM

## 2019-08-26 PROCEDURE — 99213 OFFICE O/P EST LOW 20 MIN: CPT | Performed by: STUDENT IN AN ORGANIZED HEALTH CARE EDUCATION/TRAINING PROGRAM

## 2019-08-26 PROCEDURE — G8417 CALC BMI ABV UP PARAM F/U: HCPCS | Performed by: STUDENT IN AN ORGANIZED HEALTH CARE EDUCATION/TRAINING PROGRAM

## 2019-08-26 PROCEDURE — 84550 ASSAY OF BLOOD/URIC ACID: CPT

## 2019-08-26 PROCEDURE — 1123F ACP DISCUSS/DSCN MKR DOCD: CPT | Performed by: STUDENT IN AN ORGANIZED HEALTH CARE EDUCATION/TRAINING PROGRAM

## 2019-08-26 PROCEDURE — G8428 CUR MEDS NOT DOCUMENT: HCPCS | Performed by: STUDENT IN AN ORGANIZED HEALTH CARE EDUCATION/TRAINING PROGRAM

## 2019-08-26 PROCEDURE — 99212 OFFICE O/P EST SF 10 MIN: CPT | Performed by: STUDENT IN AN ORGANIZED HEALTH CARE EDUCATION/TRAINING PROGRAM

## 2019-08-26 PROCEDURE — 4040F PNEUMOC VAC/ADMIN/RCVD: CPT | Performed by: STUDENT IN AN ORGANIZED HEALTH CARE EDUCATION/TRAINING PROGRAM

## 2019-08-26 PROCEDURE — 4004F PT TOBACCO SCREEN RCVD TLK: CPT | Performed by: STUDENT IN AN ORGANIZED HEALTH CARE EDUCATION/TRAINING PROGRAM

## 2019-08-26 RX ORDER — PANTOPRAZOLE SODIUM 40 MG/1
40 TABLET, DELAYED RELEASE ORAL DAILY
Qty: 90 TABLET | Refills: 3 | Status: SHIPPED
Start: 2019-08-26 | End: 2020-06-03 | Stop reason: SDUPTHER

## 2019-08-26 RX ORDER — CLOTRIMAZOLE 10 MG/1
10 LOZENGE ORAL; TOPICAL
Qty: 50 TABLET | Refills: 0 | Status: SHIPPED | OUTPATIENT
Start: 2019-08-26 | End: 2019-09-05

## 2019-08-26 RX ORDER — ALBUTEROL SULFATE 90 UG/1
2 AEROSOL, METERED RESPIRATORY (INHALATION) 4 TIMES DAILY
Qty: 1 INHALER | Refills: 3 | Status: SHIPPED
Start: 2019-08-26 | End: 2020-02-25 | Stop reason: SDUPTHER

## 2019-08-26 ASSESSMENT — ENCOUNTER SYMPTOMS
ABDOMINAL PAIN: 0
SHORTNESS OF BREATH: 1
CONSTIPATION: 0
EYE ITCHING: 0
NAUSEA: 0
CHEST TIGHTNESS: 1
BLOOD IN STOOL: 0
DIARRHEA: 0
ABDOMINAL DISTENTION: 0
COLOR CHANGE: 0
BACK PAIN: 1
WHEEZING: 0
COUGH: 1
EYE PAIN: 0
RHINORRHEA: 0
VOMITING: 0

## 2019-08-26 NOTE — PROGRESS NOTES
S: 81 y.o. male here for cough. Smoker. Cough is chronic, seems worse after getting hiatal hernia surgery. Pain in R upper back inferomedial to scapula only bought on when cough is severe. Cough is dry. Does not have albuterol anymore. No hemoptysis. Sob only w/ the cough. No edema. H/o tonsillar cancer in 2014, s/p chemo and radiation and surgery. Has reservations about getting low dose CT, so declined. Agreeable to get PFTs. O: VS: /80   Pulse 86   Temp 98 °F (36.7 °C)   Resp 16   Ht 6' (1.829 m)   Wt 213 lb (96.6 kg)   SpO2 98%   BMI 28.89 kg/m²    General: NAD, alert and interacting appropriately. Oral thrush. No LAD   CV:  RRR, no gallops, rubs, or murmurs    Resp: wheezing b/l. No R scapular ttp   Abd:  Soft, nontender   Ext:  No edema    Impression: chronic cough 2/2 copd vs lung cancer vs GERD vs PND. HTN. CAD. Thrush. Gout. Plan:   Refill protonix and albuterol  Refill BP meds  Cont BB, losartan, statin and asa  Clotrimazole   Recheck uric acid. Cont allopurinol   rtc 6 wks for sob      Attending Physician Statement  I have discussed the case, including pertinent history and exam findings with the resident. I agree with the documented assessment and plan.

## 2019-09-17 DIAGNOSIS — I10 ESSENTIAL HYPERTENSION: ICD-10-CM

## 2019-09-17 RX ORDER — LOSARTAN POTASSIUM 100 MG/1
100 TABLET ORAL EVERY MORNING
Qty: 90 TABLET | Refills: 1 | Status: SHIPPED
Start: 2019-09-17 | End: 2020-03-16

## 2019-10-10 ENCOUNTER — OFFICE VISIT (OUTPATIENT)
Dept: FAMILY MEDICINE CLINIC | Age: 69
End: 2019-10-10
Payer: MEDICARE

## 2019-10-10 VITALS
HEART RATE: 84 BPM | RESPIRATION RATE: 18 BRPM | WEIGHT: 212 LBS | TEMPERATURE: 97.9 F | BODY MASS INDEX: 28.71 KG/M2 | HEIGHT: 72 IN | SYSTOLIC BLOOD PRESSURE: 123 MMHG | DIASTOLIC BLOOD PRESSURE: 77 MMHG | OXYGEN SATURATION: 97 %

## 2019-10-10 DIAGNOSIS — Z72.0 TOBACCO ABUSE: ICD-10-CM

## 2019-10-10 DIAGNOSIS — I10 ESSENTIAL HYPERTENSION: Primary | ICD-10-CM

## 2019-10-10 DIAGNOSIS — Z23 NEED FOR INFLUENZA VACCINATION: ICD-10-CM

## 2019-10-10 DIAGNOSIS — Z23 NEED FOR PROPHYLACTIC VACCINATION AGAINST STREPTOCOCCUS PNEUMONIAE (PNEUMOCOCCUS): ICD-10-CM

## 2019-10-10 DIAGNOSIS — Z87.39 HISTORY OF GOUT: ICD-10-CM

## 2019-10-10 DIAGNOSIS — R05.9 COUGH: ICD-10-CM

## 2019-10-10 PROCEDURE — 99213 OFFICE O/P EST LOW 20 MIN: CPT | Performed by: STUDENT IN AN ORGANIZED HEALTH CARE EDUCATION/TRAINING PROGRAM

## 2019-10-10 PROCEDURE — G8427 DOCREV CUR MEDS BY ELIG CLIN: HCPCS | Performed by: STUDENT IN AN ORGANIZED HEALTH CARE EDUCATION/TRAINING PROGRAM

## 2019-10-10 PROCEDURE — 90732 PPSV23 VACC 2 YRS+ SUBQ/IM: CPT

## 2019-10-10 PROCEDURE — 4040F PNEUMOC VAC/ADMIN/RCVD: CPT | Performed by: STUDENT IN AN ORGANIZED HEALTH CARE EDUCATION/TRAINING PROGRAM

## 2019-10-10 PROCEDURE — G0008 ADMIN INFLUENZA VIRUS VAC: HCPCS

## 2019-10-10 PROCEDURE — G8417 CALC BMI ABV UP PARAM F/U: HCPCS | Performed by: STUDENT IN AN ORGANIZED HEALTH CARE EDUCATION/TRAINING PROGRAM

## 2019-10-10 PROCEDURE — 6360000002 HC RX W HCPCS

## 2019-10-10 PROCEDURE — 90686 IIV4 VACC NO PRSV 0.5 ML IM: CPT

## 2019-10-10 PROCEDURE — G8598 ASA/ANTIPLAT THER USED: HCPCS | Performed by: STUDENT IN AN ORGANIZED HEALTH CARE EDUCATION/TRAINING PROGRAM

## 2019-10-10 PROCEDURE — 1123F ACP DISCUSS/DSCN MKR DOCD: CPT | Performed by: STUDENT IN AN ORGANIZED HEALTH CARE EDUCATION/TRAINING PROGRAM

## 2019-10-10 PROCEDURE — G0009 ADMIN PNEUMOCOCCAL VACCINE: HCPCS

## 2019-10-10 PROCEDURE — 4004F PT TOBACCO SCREEN RCVD TLK: CPT | Performed by: STUDENT IN AN ORGANIZED HEALTH CARE EDUCATION/TRAINING PROGRAM

## 2019-10-10 PROCEDURE — 99212 OFFICE O/P EST SF 10 MIN: CPT | Performed by: STUDENT IN AN ORGANIZED HEALTH CARE EDUCATION/TRAINING PROGRAM

## 2019-10-10 PROCEDURE — 3017F COLORECTAL CA SCREEN DOC REV: CPT | Performed by: STUDENT IN AN ORGANIZED HEALTH CARE EDUCATION/TRAINING PROGRAM

## 2019-10-10 PROCEDURE — G8482 FLU IMMUNIZE ORDER/ADMIN: HCPCS | Performed by: STUDENT IN AN ORGANIZED HEALTH CARE EDUCATION/TRAINING PROGRAM

## 2019-10-10 RX ORDER — HYDROCHLOROTHIAZIDE 12.5 MG/1
12.5 TABLET ORAL DAILY
Qty: 90 TABLET | Refills: 0 | Status: SHIPPED | OUTPATIENT
Start: 2019-10-10 | End: 2020-01-03

## 2019-10-10 RX ORDER — ATORVASTATIN CALCIUM 10 MG/1
TABLET, FILM COATED ORAL
Qty: 90 TABLET | Refills: 3 | Status: SHIPPED | OUTPATIENT
Start: 2019-10-10

## 2019-10-10 RX ORDER — ALLOPURINOL 100 MG/1
100 TABLET ORAL 2 TIMES DAILY
Qty: 180 TABLET | Refills: 0 | Status: SHIPPED | OUTPATIENT
Start: 2019-10-10 | End: 2020-01-03

## 2019-10-10 RX ORDER — CLONIDINE HYDROCHLORIDE 0.2 MG/1
0.2 TABLET ORAL 2 TIMES DAILY
Qty: 180 TABLET | Refills: 0 | Status: SHIPPED | OUTPATIENT
Start: 2019-10-10 | End: 2020-01-03

## 2019-10-10 RX ORDER — AMLODIPINE BESYLATE 10 MG/1
10 TABLET ORAL
Qty: 90 TABLET | Refills: 0 | Status: SHIPPED | OUTPATIENT
Start: 2019-10-10 | End: 2020-01-03

## 2019-10-12 ASSESSMENT — ENCOUNTER SYMPTOMS
BLOOD IN STOOL: 0
COLOR CHANGE: 0
EYE PAIN: 0
DIARRHEA: 0
ABDOMINAL DISTENTION: 0
BACK PAIN: 0
COUGH: 1
NAUSEA: 0
WHEEZING: 0
ABDOMINAL PAIN: 0
SHORTNESS OF BREATH: 0
CHEST TIGHTNESS: 0
RHINORRHEA: 0
VOMITING: 0
EYE ITCHING: 0
CONSTIPATION: 0

## 2019-11-27 ENCOUNTER — TELEPHONE (OUTPATIENT)
Dept: SURGERY | Age: 69
End: 2019-11-27

## 2019-12-11 ENCOUNTER — OFFICE VISIT (OUTPATIENT)
Dept: SURGERY | Age: 69
End: 2019-12-11
Payer: MEDICARE

## 2019-12-11 VITALS
HEIGHT: 72 IN | OXYGEN SATURATION: 97 % | TEMPERATURE: 97.9 F | SYSTOLIC BLOOD PRESSURE: 143 MMHG | RESPIRATION RATE: 16 BRPM | BODY MASS INDEX: 29.12 KG/M2 | HEART RATE: 82 BPM | WEIGHT: 215 LBS | DIASTOLIC BLOOD PRESSURE: 86 MMHG

## 2019-12-11 DIAGNOSIS — Z98.890 HISTORY OF REPAIR OF HIATAL HERNIA: Primary | ICD-10-CM

## 2019-12-11 DIAGNOSIS — Z87.19 HISTORY OF REPAIR OF HIATAL HERNIA: Primary | ICD-10-CM

## 2019-12-11 PROCEDURE — G8427 DOCREV CUR MEDS BY ELIG CLIN: HCPCS | Performed by: SURGERY

## 2019-12-11 PROCEDURE — 99211 OFF/OP EST MAY X REQ PHY/QHP: CPT | Performed by: SURGERY

## 2019-12-11 PROCEDURE — G8417 CALC BMI ABV UP PARAM F/U: HCPCS | Performed by: SURGERY

## 2019-12-11 RX ORDER — OXYCODONE AND ACETAMINOPHEN 7.5; 325 MG/1; MG/1
TABLET ORAL
Refills: 0 | COMMUNITY
Start: 2019-11-11

## 2020-01-02 ENCOUNTER — TELEPHONE (OUTPATIENT)
Dept: FAMILY MEDICINE CLINIC | Age: 70
End: 2020-01-02

## 2020-01-02 RX ORDER — CLOTRIMAZOLE 10 MG/1
10 LOZENGE ORAL; TOPICAL
Qty: 50 TABLET | Refills: 3 | Status: SHIPPED | OUTPATIENT
Start: 2020-01-02 | End: 2020-01-12

## 2020-01-02 NOTE — TELEPHONE ENCOUNTER
Pt called in requesting Clotriamozole Trioche 10mg refilled but I did not see it in his med list to pend for you.

## 2020-01-03 RX ORDER — ALLOPURINOL 100 MG/1
TABLET ORAL
Qty: 180 TABLET | Refills: 0 | Status: SHIPPED
Start: 2020-01-03 | End: 2020-04-01 | Stop reason: SDUPTHER

## 2020-01-03 RX ORDER — CLONIDINE HYDROCHLORIDE 0.2 MG/1
TABLET ORAL
Qty: 180 TABLET | Refills: 0 | Status: SHIPPED
Start: 2020-01-03 | End: 2020-04-01 | Stop reason: SDUPTHER

## 2020-01-03 RX ORDER — AMLODIPINE BESYLATE 10 MG/1
TABLET ORAL
Qty: 90 TABLET | Refills: 0 | Status: SHIPPED
Start: 2020-01-03 | End: 2020-04-01 | Stop reason: SDUPTHER

## 2020-01-03 RX ORDER — HYDROCHLOROTHIAZIDE 12.5 MG/1
TABLET ORAL
Qty: 90 TABLET | Refills: 0 | Status: SHIPPED
Start: 2020-01-03 | End: 2020-04-01 | Stop reason: SDUPTHER

## 2020-01-09 ENCOUNTER — OFFICE VISIT (OUTPATIENT)
Dept: FAMILY MEDICINE CLINIC | Age: 70
End: 2020-01-09
Payer: MEDICARE

## 2020-01-09 VITALS
BODY MASS INDEX: 28.99 KG/M2 | RESPIRATION RATE: 26 BRPM | WEIGHT: 214 LBS | SYSTOLIC BLOOD PRESSURE: 134 MMHG | OXYGEN SATURATION: 95 % | HEART RATE: 80 BPM | DIASTOLIC BLOOD PRESSURE: 86 MMHG | TEMPERATURE: 97.8 F | HEIGHT: 72 IN

## 2020-01-09 PROCEDURE — 99213 OFFICE O/P EST LOW 20 MIN: CPT | Performed by: STUDENT IN AN ORGANIZED HEALTH CARE EDUCATION/TRAINING PROGRAM

## 2020-01-09 PROCEDURE — 6370000000 HC RX 637 (ALT 250 FOR IP)

## 2020-01-09 PROCEDURE — G8482 FLU IMMUNIZE ORDER/ADMIN: HCPCS | Performed by: STUDENT IN AN ORGANIZED HEALTH CARE EDUCATION/TRAINING PROGRAM

## 2020-01-09 PROCEDURE — G8427 DOCREV CUR MEDS BY ELIG CLIN: HCPCS | Performed by: STUDENT IN AN ORGANIZED HEALTH CARE EDUCATION/TRAINING PROGRAM

## 2020-01-09 PROCEDURE — G8926 SPIRO NO PERF OR DOC: HCPCS | Performed by: STUDENT IN AN ORGANIZED HEALTH CARE EDUCATION/TRAINING PROGRAM

## 2020-01-09 PROCEDURE — 1123F ACP DISCUSS/DSCN MKR DOCD: CPT | Performed by: STUDENT IN AN ORGANIZED HEALTH CARE EDUCATION/TRAINING PROGRAM

## 2020-01-09 PROCEDURE — 3023F SPIROM DOC REV: CPT | Performed by: STUDENT IN AN ORGANIZED HEALTH CARE EDUCATION/TRAINING PROGRAM

## 2020-01-09 PROCEDURE — 3017F COLORECTAL CA SCREEN DOC REV: CPT | Performed by: STUDENT IN AN ORGANIZED HEALTH CARE EDUCATION/TRAINING PROGRAM

## 2020-01-09 PROCEDURE — G8417 CALC BMI ABV UP PARAM F/U: HCPCS | Performed by: STUDENT IN AN ORGANIZED HEALTH CARE EDUCATION/TRAINING PROGRAM

## 2020-01-09 PROCEDURE — 4040F PNEUMOC VAC/ADMIN/RCVD: CPT | Performed by: STUDENT IN AN ORGANIZED HEALTH CARE EDUCATION/TRAINING PROGRAM

## 2020-01-09 PROCEDURE — 6360000002 HC RX W HCPCS

## 2020-01-09 PROCEDURE — 4004F PT TOBACCO SCREEN RCVD TLK: CPT | Performed by: STUDENT IN AN ORGANIZED HEALTH CARE EDUCATION/TRAINING PROGRAM

## 2020-01-09 RX ORDER — IPRATROPIUM BROMIDE AND ALBUTEROL SULFATE 2.5; .5 MG/3ML; MG/3ML
1 SOLUTION RESPIRATORY (INHALATION) EVERY 4 HOURS
Qty: 360 ML | Refills: 5 | Status: ON HOLD
Start: 2020-01-09 | End: 2020-02-21 | Stop reason: SDUPTHER

## 2020-01-09 RX ORDER — DOXYCYCLINE HYCLATE 100 MG
100 TABLET ORAL 2 TIMES DAILY
Qty: 14 TABLET | Refills: 0 | Status: SHIPPED | OUTPATIENT
Start: 2020-01-09 | End: 2020-01-16

## 2020-01-09 RX ORDER — PREDNISONE 20 MG/1
40 TABLET ORAL DAILY
Qty: 10 TABLET | Refills: 2 | Status: SHIPPED | OUTPATIENT
Start: 2020-01-09 | End: 2020-01-14

## 2020-01-09 RX ORDER — ALBUTEROL SULFATE 2.5 MG/3ML
2.5 SOLUTION RESPIRATORY (INHALATION) ONCE
Status: COMPLETED | OUTPATIENT
Start: 2020-01-09 | End: 2020-01-09

## 2020-01-09 RX ADMIN — ALBUTEROL SULFATE 2.5 MG: 2.5 SOLUTION RESPIRATORY (INHALATION) at 10:33

## 2020-01-09 RX ADMIN — Medication 0.5 MG: at 10:34

## 2020-01-09 NOTE — PROGRESS NOTES
2020     Mamta Martinez (:  1950) is a 81 y.o. male, here for evaluation of the following medical concerns:    HPI    COPD/Asthma Exacerbation: He has previously been evaluated here for SOB and dyspnea with restrictive disease. He now presents with an exacerbation. This exacerbation began a few days ago. Associated symptoms include dyspnea, productive cough with sputum described as yellow and wheezing. Suspected precipitants include cold air. Symptoms have been gradually worsening since their onset. This is the first evaluation that has occurred during this exacerbation. He has treated this current exacerbation with short-acting inhaled beta-adrenergic agonists. The patient reports adherence to this regimen although he continues to smoke. Review of Systems   Constitutional: Positive for chills, diaphoresis, fatigue and fever. Negative for activity change and unexpected weight change. HENT: Positive for congestion and sore throat. Negative for ear discharge, ear pain, rhinorrhea, sinus pressure, sinus pain, tinnitus and trouble swallowing. Eyes: Negative for pain, itching and visual disturbance. Respiratory: Positive for cough, chest tightness, shortness of breath and wheezing. Cardiovascular: Negative for chest pain, palpitations and leg swelling. Gastrointestinal: Negative for abdominal distention, abdominal pain, blood in stool, constipation, diarrhea, nausea and vomiting. Endocrine: Negative for cold intolerance, heat intolerance, polydipsia and polyuria. Genitourinary: Negative for frequency, hematuria and urgency. Musculoskeletal: Negative for arthralgias, back pain, gait problem and myalgias. Skin: Negative for color change, pallor, rash and wound. Neurological: Negative for dizziness, weakness, light-headedness and headaches. Psychiatric/Behavioral: Negative for agitation and suicidal ideas. The patient is not nervous/anxious.         Prior to Visit Medications    Medication Sig Taking? Authorizing Provider   ipratropium-albuterol (DUONEB) 0.5-2.5 (3) MG/3ML SOLN nebulizer solution Inhale 3 mLs into the lungs every 4 hours Yes Anuradha Barahona MD   doxycycline hyclate (VIBRA-TABS) 100 MG tablet Take 1 tablet by mouth 2 times daily for 7 days Yes Anuradha Barahona MD   predniSONE (DELTASONE) 20 MG tablet Take 2 tablets by mouth daily for 5 days Yes Anuradha Barahona MD   cloNIDine (CATAPRES) 0.2 MG tablet take 1 tablet by mouth twice a day Yes Anuradha Barahona MD   amLODIPine (NORVASC) 10 MG tablet take 1 tablet by mouth every morning BEFORE BREAKFAST Yes Anuradha Barahona MD   allopurinol (ZYLOPRIM) 100 MG tablet take 1 tablet by mouth twice a day Yes Anuradha Barahona MD   hydrochlorothiazide (HYDRODIURIL) 12.5 MG tablet take 1 tablet by mouth once daily Yes Anuradha Barahona MD   oxyCODONE-acetaminophen (PERCOCET) 7.5-325 MG per tablet take 1 tablet by mouth every 8 hours AS NEEDED FOR PAIN Yes Historical Provider, MD   atorvastatin (LIPITOR) 10 MG tablet take 1 tablet by mouth at bedtime Yes Anuradha Barahona MD   losartan (COZAAR) 100 MG tablet take 1 tablet by mouth every morning Yes Anuradha Barahona MD   pantoprazole (PROTONIX) 40 MG tablet Take 1 tablet by mouth daily Yes Anuradha Barahona MD   albuterol sulfate HFA (PROVENTIL HFA) 108 (90 Base) MCG/ACT inhaler Inhale 2 puffs into the lungs 4 times daily Yes Anuradha Barahona MD   fluticasone propionate (FLOVENT DISKUS) 100 MCG/BLIST AEPB inhaler Inhale 1 puff into the lungs daily Rinse mouth after using. Yes Anuradha Barahona MD   metoprolol succinate (TOPROL XL) 50 MG extended release tablet Take 1 tablet by mouth every morning Yes Anuradha Barahona MD   [facility] 4 MG/0.1ML LIQD nasal spray instill 1 spray in 1 NOSTRIL if needed for opioid overdose may re. ..  (REFER TO PRESCRIPTION NOTES).  Yes Historical Provider, MD   aspirin 81 MG EC tablet Take 1 tablet by mouth daily Yes Dory Velez Horace Cr MD   naproxen (NAPROXEN) 500 MG EC tablet Take 1 tablet by mouth 2 times daily (with meals) Yes Tong Murphy MD   sildenafil (VIAGRA) 100 MG tablet Take 1 tablet by mouth as needed for Erectile Dysfunction Yes Jose Raul Rodriguez MD   sodium chloride (OCEAN) 0.65 % nasal spray 1 spray by Nasal route as needed for Congestion Yes Dany Hummel,         Social History     Tobacco Use    Smoking status: Current Every Day Smoker     Packs/day: 1.00     Years: 31.00     Pack years: 31.00     Types: Cigarettes    Smokeless tobacco: Never Used   Substance Use Topics    Alcohol use: No        Vitals:    01/09/20 1018 01/09/20 1116   BP: (!) 139/97 134/86   Site: Right Upper Arm Right Upper Arm   Position: Sitting Sitting   Cuff Size: Medium Adult Medium Adult   Pulse: 80    Resp: 26    Temp: 97.8 °F (36.6 °C)    TempSrc: Oral    SpO2: 95%    Weight: 214 lb (97.1 kg)    Height: 6' (1.829 m)      Estimated body mass index is 29.02 kg/m² as calculated from the following:    Height as of this encounter: 6' (1.829 m). Weight as of this encounter: 214 lb (97.1 kg). Physical Exam  Constitutional:       General: He is in acute distress. Appearance: He is ill-appearing. HENT:      Head: Normocephalic and atraumatic. Right Ear: Tympanic membrane normal. Tympanic membrane is not injected, erythematous or bulging. Left Ear: Tympanic membrane normal. Tympanic membrane is not injected, erythematous or bulging. Nose: Rhinorrhea present. No congestion. Right Sinus: No maxillary sinus tenderness or frontal sinus tenderness. Left Sinus: No maxillary sinus tenderness or frontal sinus tenderness. Mouth/Throat:      Mouth: Mucous membranes are moist.      Pharynx: Posterior oropharyngeal erythema present. No oropharyngeal exudate. Eyes:      General:         Right eye: No discharge. Left eye: No discharge. Extraocular Movements: Extraocular movements intact. Conjunctiva/sclera: Conjunctivae normal.   Neck:      Musculoskeletal: No neck rigidity or muscular tenderness. Cardiovascular:      Rate and Rhythm: Normal rate. Pulses: Normal pulses. Heart sounds: No murmur. Pulmonary:      Effort: Tachypnea, accessory muscle usage and respiratory distress present. Breath sounds: Decreased air movement present. Examination of the right-upper field reveals wheezing. Examination of the left-upper field reveals wheezing. Examination of the right-middle field reveals wheezing. Examination of the left-middle field reveals wheezing. Examination of the right-lower field reveals wheezing. Examination of the left-lower field reveals wheezing. Wheezing present. No rhonchi or rales. Comments: Improved aeration following duoneb treatment  Abdominal:      General: Abdomen is flat. Bowel sounds are normal. There is no distension. Tenderness: There is no tenderness. Lymphadenopathy:      Cervical: Cervical adenopathy present. Skin:     General: Skin is warm. Capillary Refill: Capillary refill takes less than 2 seconds. Coloration: Skin is not pale. Findings: No erythema. Neurological:      General: No focal deficit present. Mental Status: He is alert and oriented to person, place, and time. Cranial Nerves: No cranial nerve deficit. Sensory: No sensory deficit. Psychiatric:         Mood and Affect: Mood normal.         Behavior: Behavior normal.         Thought Content: Thought content normal.         Judgment: Judgment normal.         ASSESSMENT/PLAN:  1. COPD exacerbation ([address]  - Need for formal repeat PFTs  - Improved following duonebs in clinic  - Nebulizer prescription and supplies given  - Treat with antibiotics and steroids as listed below  - ipratropium-albuterol (DUONEB) 0.5-2.5 (3) MG/3ML SOLN nebulizer solution;  Inhale 3 mLs into the lungs every 4 hours  Dispense: 360 mL; Refill: 5  - doxycycline hyclate (VIBRA-TABS) 100 MG tablet; Take 1 tablet by mouth 2 times daily for 7 days  Dispense: 14 tablet; Refill: 0  - predniSONE (DELTASONE) 20 MG tablet; Take 2 tablets by mouth daily for 5 days  Dispense: 10 tablet; Refill: 2  - DME Order for Nebulizer as OP      Return in about 1 week (around 1/16/2020) for f/u exacerbation. An electronic signature was used to authenticate this note.     --Prashant Lomas MD on 1/13/2020 at 10:05 AM

## 2020-01-09 NOTE — PROGRESS NOTES
S: 81 y.o. male with history of throat CA, lung nodules declining further evaluation. Feeling very sick for the past week, short of breath, productive cough, no F/C. No aches. No ear pain. Nasal congestion noted. Sick contacts. Using rescue inhaler daily without relief. Smoking less due to cough. Using OTC decongestants, not helping. Feels short of breath. O: VS: BP (!) 139/97 (Site: Right Upper Arm, Position: Sitting, Cuff Size: Medium Adult)   Pulse 80   Temp 97.8 °F (36.6 °C) (Oral)   Resp 26   Ht 6' (1.829 m)   Wt 214 lb (97.1 kg)   SpO2 95%   BMI 29.02 kg/m²    General: NAD, respirations somewhat labored, frequent cough    Neck:  Shoddy lymph nodes anterior cervical    ENT:  Mild erythema    CV:  RRR, no gallops, rubs, or murmurs   Resp: diminished, crackles bilateral, wheezing   Ext:  No edema  Impression: COPD exacerbation   Plan: DuoNeb in office, follow closely. Consider hospital admission. Consider CXR. Antibiotics, doxy. Prednisone 40 mg. Neb for home. Reconsider home medications. Patient responded very well to neb treatment in office. Plan to treat and follow closely for outpatient; caution on warning signs/symptoms for which to seek treatment urgently. Attending Physician Statement  I have discussed the case, including pertinent history and exam findings with the resident. I agree with the documented assessment and plan.

## 2020-01-13 ASSESSMENT — ENCOUNTER SYMPTOMS
ABDOMINAL DISTENTION: 0
WHEEZING: 1
EYE ITCHING: 0
SHORTNESS OF BREATH: 1
DIARRHEA: 0
CONSTIPATION: 0
SORE THROAT: 1
BLOOD IN STOOL: 0
COLOR CHANGE: 0
CHEST TIGHTNESS: 1
SINUS PRESSURE: 0
TROUBLE SWALLOWING: 0
NAUSEA: 0
BACK PAIN: 0
RHINORRHEA: 0
SINUS PAIN: 0
ABDOMINAL PAIN: 0
COUGH: 1
EYE PAIN: 0
VOMITING: 0

## 2020-02-05 ENCOUNTER — OFFICE VISIT (OUTPATIENT)
Dept: CARDIOLOGY CLINIC | Age: 70
End: 2020-02-05
Payer: MEDICARE

## 2020-02-05 VITALS
WEIGHT: 208.6 LBS | HEIGHT: 72 IN | HEART RATE: 80 BPM | SYSTOLIC BLOOD PRESSURE: 138 MMHG | BODY MASS INDEX: 28.25 KG/M2 | RESPIRATION RATE: 18 BRPM | DIASTOLIC BLOOD PRESSURE: 68 MMHG

## 2020-02-05 PROCEDURE — 4004F PT TOBACCO SCREEN RCVD TLK: CPT | Performed by: INTERNAL MEDICINE

## 2020-02-05 PROCEDURE — 3023F SPIROM DOC REV: CPT | Performed by: INTERNAL MEDICINE

## 2020-02-05 PROCEDURE — 93000 ELECTROCARDIOGRAM COMPLETE: CPT | Performed by: INTERNAL MEDICINE

## 2020-02-05 PROCEDURE — 1123F ACP DISCUSS/DSCN MKR DOCD: CPT | Performed by: INTERNAL MEDICINE

## 2020-02-05 PROCEDURE — 3017F COLORECTAL CA SCREEN DOC REV: CPT | Performed by: INTERNAL MEDICINE

## 2020-02-05 PROCEDURE — 99213 OFFICE O/P EST LOW 20 MIN: CPT | Performed by: INTERNAL MEDICINE

## 2020-02-05 PROCEDURE — G8482 FLU IMMUNIZE ORDER/ADMIN: HCPCS | Performed by: INTERNAL MEDICINE

## 2020-02-05 PROCEDURE — 4040F PNEUMOC VAC/ADMIN/RCVD: CPT | Performed by: INTERNAL MEDICINE

## 2020-02-05 PROCEDURE — G8427 DOCREV CUR MEDS BY ELIG CLIN: HCPCS | Performed by: INTERNAL MEDICINE

## 2020-02-05 PROCEDURE — G8417 CALC BMI ABV UP PARAM F/U: HCPCS | Performed by: INTERNAL MEDICINE

## 2020-02-05 PROCEDURE — G8926 SPIRO NO PERF OR DOC: HCPCS | Performed by: INTERNAL MEDICINE

## 2020-02-06 NOTE — PROGRESS NOTES
sodium chloride (OCEAN) 0.65 % nasal spray, 1 spray by Nasal route as needed for Congestion, Disp: 1 Bottle, Rfl: 0    NARCAN 4 MG/0.1ML LIQD nasal spray, instill 1 spray in 1 NOSTRIL if needed for opioid overdose may re. ..  (REFER TO PRESCRIPTION NOTES). , Disp: , Rfl: 0      S: REASON FOR VISIT:    Coronary artery disease/ischemic cardiomyopathy. Zackary Charles is a pleasant, 61-year-old gentleman with cardiovascular history as described below. He underwent laparoscopic hiatal hernia repair with fundoplication on 3/07/8818 without any perioperative complications and reports decrease in his acid reflux symptoms. He has been suffering from an upper respiratory tract infection and COPD exacerbation for which he was seen by Family medicine on 1/9/2019 and for which he was treated with oral antibiotics, doxycycline, DUONEB and Prednisone with improvement in his symptoms. He, however, does still complain of some congestion, cough productive of whitish/yellowish sputum and mild shortness of breath. He unfortunately continues to smoke 1/2 pack of cigarettes a day. He denies chest pain. He denies orthopnea or PND's. He reports occasional mild lower extremity swelling at the end of the day after being on his feet for an extended period of time, which resolves after an overnight sleep. He denies palpitations, dizziness, presyncope or syncope. REVIEW OF SYSTEMS:    CONSTITUTIONAL:  Denies fevers, chills, night sweats or fatigue. HEENT:  Denies any unusual headaches.  Denies any recent changes in hearing or vision.  Denies dysphagia, hoarseness, hemoptysis, hematemesis or any recent epistaxis. ENDOCRINE:  Denies polyphagia, polydipsia or polyuria.  Denies heat or cold intolerance. MUSCULOSKELETAL:  Denies any significant arthralgias or myalgias. SKIN:  Denies rashes, ulcers or itching. HEME/LYMPH:  Denies any palpable lymph nodes, bleeding or easy bruisability. HEART:  Denies chest discomfort or palpitations. Lower extremity edema as described above. LUNGS:  He has been complaining of chest congestion, cough productive of whitish to yellowish sputum and exertional dyspnea and continues to smoke at least 1/2 pack of cigarettes a day. GI: He has acid reflux symptoms, but denies other types of abdominal pain. He denies nausea, vomiting, diarrhea, constipation, rectal bleeding or tarry stools. :  Denies hematuria or dysuria. PSYCHIATRIC:  Denies mood changes, anxiety or depression. NEUROLOGIC:  Denies dizziness, syncope, numbness or tingling.         CARDIOVASCULAR HISTORY:    1. Hypertension. 2. Tobacco abuse. 3. Coronary artery disease:   a. Treadmill nuclear stress test done on 11/25/12: Michael protocol. 6 minutes. 88% of the maximum-predicted heart rate. Physiologic blood pressure response. No chest pain. No ischemic EKG changes. The nuclear images were read, however, as showing mild reversible defect involving the inferior wall with mild hypokinesis of the inferior wall and ejection fraction of 43%. b. 11/27/12, Cardiac catheterization/angioplasty: Left main no significant disease. LAD 50% mid vessel narrowing. Intermediate ramus 30% ostial vessel narrowing. LCX large codominant vessel with 80% stenosis of a large first marginal branch and 70% eccentric stenosis of the distal left circumflex. RCA is small-to-moderate size codominant vessel with no significant disease. Mildly dilated left ventricle with no regional wall motion abnormality with an estimated ejection fraction of 55% with post PVC mitral regurgitation. Systemic hypertension.  Elevated left ventricular end-diastolic pressure suggestive of diastolic dysfunction.   -Successful primary stenting with a drug-eluting coronary stent to the obtuse marginal branch of the left circumflex with excellent results.   -Successful primary stenting to the distal left circumflex with a drug-eluting coronary stent with excellent results.   -Closure of the right femoral artery access site with Angio-Seal device. c. 07/17/13 Treadmill nuclear stress test: Michael protocol. 6 minutes. 92% of the maximum-predicted heart rate. Physiologic BP response. No chest pain. No ischemic EKG changes. Nuclear images were abnormal suggesting the presence of an infarction involving the inferior wall of the left ventricle extending to the basal inferoseptal wall but with no evidence of significant residual stress-induced ischemia with the gated views showing hypokinesis of the inferior wall and the basal septum with mildly impaired global left ventricular systolic function with a computer-calculated ejection fraction of 45%.   d. Exercise nuclear stress test July 15, 2015 with no significant ST-T wave changes and achieved 91% of maximum predicted heart rate and 7 METS,nuclear study within normal limits showing mild soft tissue attenuation artifact with no convincing evidence of scars or stress-induced ischemia and no regional wall motion abnormality and EF 47% and the reversible defect of the inferior wall on the previous study was not seen on this study. e.  [place] nuclear stress test done on 6/11/2018 showed a mild, small-to-moderate sized, fixed mid inferior wall defect suggestive of a prior myocardial infarction with no evidence of stress-induced ischemia with the gated views showing hypokinesis of the inferior wall, with a calculated ejection fraction of 44%. 4. 6/11/2018 Echocardiogram showed a mildly dilated left ventricle with borderline concentric left ventricular hypertrophy with possible basal inferior wall, basal septal wall and basal lateral wall hypokinesis with an estimated ejection fraction of 50% with stage 1 left ventricular diastolic dysfunction, normal right ventricular size and function, mildly dilated aortic root. No significant valvular abnormalities noted. 5. Erectile dysfunction.      PAST MEDICAL HISTORY:   1. As under cardiovascular history.    2. Degenerative disc disease of the lumbar spine. 3. Left tonsillar squamous cell carcinoma, status post radiation and chemotherapy. 4. Status post hernia repair. 5. Status post full mouth dental extraction. 6. History of psychiatric problems: Unspecified. 7. Status post colonoscopy. 8.  S/P excision of a right supraorbital sebaceous cyst on 3/11/2015.  9. EGD 2015: mild gastritis  10. Gout  11. Laparoscopic left inguinal hernia repair with mesh 2017  12. EGD 3/16/2018: Hiatal Hernia with esophagitis, gastritis. 15. S/P laparoscopic hiatal hernia repair/fundoplication, . 15. COPD.      FAMILY HISTORY:   Mother  from lung cancer in her mid 52's. Father  of lung cancer in his mid 42's.      SOCIAL HISTORY:   Patient smokes 1 pack of cigarettes daily; he has bee a smoker most of his life and has smoked between 1/3 and 1 PPD. He denies alcohol or illicit drug use.        O:  COMPLETE PHYSICAL EXAM:      /68   Pulse 80   Resp 18   Ht 6' (1.829 m)   Wt 208 lb 9.6 oz (94.6 kg)   BMI 28.29 kg/m²      General:          Middle-aged -American male in no acute distress. HEENT:           Normocephalic and atraumatic head. No JVD. No carotid bruits. Carotid upstrokes normal bilaterally.  Mucous membranes moist.  Chest:              Symmetrical and nontender.  No deformities. Lungs:             Occasional end expiratory wheezes. Heart:              Normal S1 and S2.  No S3 or S4.  No murmurs or rubs. Abdomen:        Soft, nontender without organomegaly or masses.  No bruits.  Normal bowel sounds. Extremities:     No edema. Pulses normal. Moves all extremities   Skin:                Normal turgor. No rashes or ulcers noted. Neurologic:      Oriented x3.  No motor or sensory deficits detected. REVIEW OF DIAGNOSTIC TESTS:    1. EKG done today showed sinus rhythm with frequent PAC's, but was otherwise unremarkable. ASSESSMENT / DIAGNOSIS:   1.  Coronary artery disease: Clinically stable. 2. Mild LV dysfunction with no signs of fluid overload. 3. Hypertension: Borderline control. 4. Tobacco abuse  5. Erectile dysfunction  6. Degenerative disc disease of the lumbar spine  7. History of left tonsillar squamous cell carcinoma, status post radiation and chemotherapy  8. Obesity  9. History of gout  10. Hiatal hernia and GERD with history of esophagitis and gastritis. S/P laparoscopic hiatal hernia repair/fundoplication in 4/7338. 11. Upper respiratory tract infection/COPD exacerbation. TREATMENT PLAN:  1. Continue current cardiac medication. 2.  Patient strongly advised smoking cessation. 3.  Follow up with Cardiology in 1 year or on a prn basis. Manny Hernandez.  [place] [id number] [phone]

## 2020-02-13 ENCOUNTER — OFFICE VISIT (OUTPATIENT)
Dept: FAMILY MEDICINE CLINIC | Age: 70
End: 2020-02-13
Payer: MEDICARE

## 2020-02-13 VITALS
HEART RATE: 86 BPM | DIASTOLIC BLOOD PRESSURE: 70 MMHG | WEIGHT: 211.8 LBS | RESPIRATION RATE: 18 BRPM | HEIGHT: 72 IN | OXYGEN SATURATION: 96 % | BODY MASS INDEX: 28.69 KG/M2 | TEMPERATURE: 98 F | SYSTOLIC BLOOD PRESSURE: 110 MMHG

## 2020-02-13 PROCEDURE — 99212 OFFICE O/P EST SF 10 MIN: CPT | Performed by: STUDENT IN AN ORGANIZED HEALTH CARE EDUCATION/TRAINING PROGRAM

## 2020-02-13 PROCEDURE — 4004F PT TOBACCO SCREEN RCVD TLK: CPT | Performed by: STUDENT IN AN ORGANIZED HEALTH CARE EDUCATION/TRAINING PROGRAM

## 2020-02-13 PROCEDURE — 3017F COLORECTAL CA SCREEN DOC REV: CPT | Performed by: STUDENT IN AN ORGANIZED HEALTH CARE EDUCATION/TRAINING PROGRAM

## 2020-02-13 PROCEDURE — G8926 SPIRO NO PERF OR DOC: HCPCS | Performed by: STUDENT IN AN ORGANIZED HEALTH CARE EDUCATION/TRAINING PROGRAM

## 2020-02-13 PROCEDURE — 99213 OFFICE O/P EST LOW 20 MIN: CPT | Performed by: STUDENT IN AN ORGANIZED HEALTH CARE EDUCATION/TRAINING PROGRAM

## 2020-02-13 PROCEDURE — 1123F ACP DISCUSS/DSCN MKR DOCD: CPT | Performed by: STUDENT IN AN ORGANIZED HEALTH CARE EDUCATION/TRAINING PROGRAM

## 2020-02-13 PROCEDURE — G8482 FLU IMMUNIZE ORDER/ADMIN: HCPCS | Performed by: STUDENT IN AN ORGANIZED HEALTH CARE EDUCATION/TRAINING PROGRAM

## 2020-02-13 PROCEDURE — 3023F SPIROM DOC REV: CPT | Performed by: STUDENT IN AN ORGANIZED HEALTH CARE EDUCATION/TRAINING PROGRAM

## 2020-02-13 PROCEDURE — G8427 DOCREV CUR MEDS BY ELIG CLIN: HCPCS | Performed by: STUDENT IN AN ORGANIZED HEALTH CARE EDUCATION/TRAINING PROGRAM

## 2020-02-13 PROCEDURE — 4040F PNEUMOC VAC/ADMIN/RCVD: CPT | Performed by: STUDENT IN AN ORGANIZED HEALTH CARE EDUCATION/TRAINING PROGRAM

## 2020-02-13 PROCEDURE — G8417 CALC BMI ABV UP PARAM F/U: HCPCS | Performed by: STUDENT IN AN ORGANIZED HEALTH CARE EDUCATION/TRAINING PROGRAM

## 2020-02-13 ASSESSMENT — PATIENT HEALTH QUESTIONNAIRE - PHQ9
2. FEELING DOWN, DEPRESSED OR HOPELESS: 0
1. LITTLE INTEREST OR PLEASURE IN DOING THINGS: 0
SUM OF ALL RESPONSES TO PHQ QUESTIONS 1-9: 0
SUM OF ALL RESPONSES TO PHQ9 QUESTIONS 1 & 2: 0
SUM OF ALL RESPONSES TO PHQ QUESTIONS 1-9: 0

## 2020-02-13 NOTE — PROGRESS NOTES
(INCRUSE ELLIPTA) 62.5 MCG/INH AEPB Inhale 1 puff into the lungs daily Yes Missy Lance MD   ipratropium-albuterol (DUONEB) 0.5-2.5 (3) MG/3ML SOLN nebulizer solution Inhale 3 mLs into the lungs every 4 hours Yes Missy Lance MD   cloNIDine (CATAPRES) 0.2 MG tablet take 1 tablet by mouth twice a day Yes Missy Lance MD   amLODIPine (NORVASC) 10 MG tablet take 1 tablet by mouth every morning BEFORE BREAKFAST Yes Missy Lance MD   allopurinol (ZYLOPRIM) 100 MG tablet take 1 tablet by mouth twice a day Yes Missy Lance MD   hydrochlorothiazide (HYDRODIURIL) 12.5 MG tablet take 1 tablet by mouth once daily Yes Missy Lance MD   oxyCODONE-acetaminophen (PERCOCET) 7.5-325 MG per tablet take 1 tablet by mouth every 8 hours AS NEEDED FOR PAIN Yes Historical Provider, MD   atorvastatin (LIPITOR) 10 MG tablet take 1 tablet by mouth at bedtime Yes Missy Lance MD   losartan (COZAAR) 100 MG tablet take 1 tablet by mouth every morning Yes Missy Lance MD   pantoprazole (PROTONIX) 40 MG tablet Take 1 tablet by mouth daily Yes Missy Lance MD   albuterol sulfate HFA (PROVENTIL HFA) 108 (90 Base) MCG/ACT inhaler Inhale 2 puffs into the lungs 4 times daily Yes Missy Lance MD   metoprolol succinate (TOPROL XL) 50 MG extended release tablet Take 1 tablet by mouth every morning Yes Missy Lance MD   NARCAN 4 MG/0.1ML LIQD nasal spray instill 1 spray in 1 NOSTRIL if needed for opioid overdose may re. ..  (REFER TO PRESCRIPTION NOTES).  Yes Historical Provider, MD   aspirin 81 MG EC tablet Take 1 tablet by mouth daily Yes Missy Lance MD   naproxen (NAPROXEN) 500 MG EC tablet Take 1 tablet by mouth 2 times daily (with meals) Yes Missy Lance MD   sildenafil (VIAGRA) 100 MG tablet Take 1 tablet by mouth as needed for Erectile Dysfunction Yes Racquel Sterling MD   sodium chloride (OCEAN) 0.65 % nasal spray 1 spray by Nasal route as needed for Congestion Yes Larry Montalvo Harley Hendrix DO        Social History     Tobacco Use    Smoking status: Current Every Day Smoker     Packs/day: 0.50     Years: 31.00     Pack years: 15.50     Types: Cigarettes    Smokeless tobacco: Never Used   Substance Use Topics    Alcohol use: No        Vitals:    02/13/20 1059   BP: 110/70   Site: Right Upper Arm   Position: Sitting   Cuff Size: Large Adult   Pulse: 86   Resp: 18   Temp: 98 °F (36.7 °C)   TempSrc: Oral   SpO2: 96%   Weight: 211 lb 12.8 oz (96.1 kg)   Height: 6' (1.829 m)     Estimated body mass index is 28.73 kg/m² as calculated from the following:    Height as of this encounter: 6' (1.829 m). Weight as of this encounter: 211 lb 12.8 oz (96.1 kg). Physical Exam  Vitals signs reviewed. Constitutional:       General: He is not in acute distress. Appearance: He is well-developed. He is not diaphoretic. HENT:      Head: Normocephalic. Right Ear: Tympanic membrane, ear canal and external ear normal.      Left Ear: Tympanic membrane, ear canal and external ear normal.      Nose: Nose normal. No congestion or rhinorrhea. Mouth/Throat:      Mouth: Mucous membranes are moist.      Pharynx: Posterior oropharyngeal erythema present. No oropharyngeal exudate. Eyes:      General: No scleral icterus. Right eye: No discharge. Left eye: No discharge. Conjunctiva/sclera: Conjunctivae normal.   Neck:      Musculoskeletal: Normal range of motion and neck supple. Thyroid: No thyromegaly. Vascular: No JVD. Cardiovascular:      Rate and Rhythm: Normal rate and regular rhythm. Heart sounds: Normal heart sounds. No murmur. Pulmonary:      Effort: Pulmonary effort is normal. No respiratory distress. Breath sounds: No stridor, decreased air movement or transmitted upper airway sounds. Examination of the right-lower field reveals wheezing. Wheezing present. No decreased breath sounds, rhonchi or rales. Chest:      Chest wall: Tenderness present.

## 2020-02-16 ASSESSMENT — ENCOUNTER SYMPTOMS
DIARRHEA: 0
SINUS PRESSURE: 0
COUGH: 1
VOICE CHANGE: 0
NAUSEA: 0
WHEEZING: 0
CHEST TIGHTNESS: 1
COLOR CHANGE: 0
SORE THROAT: 0
TROUBLE SWALLOWING: 0
CONSTIPATION: 0
CHOKING: 0
RHINORRHEA: 0
SHORTNESS OF BREATH: 1
ABDOMINAL PAIN: 0
VOMITING: 0
BLOOD IN STOOL: 0
EYE ITCHING: 0
ABDOMINAL DISTENTION: 0
SINUS PAIN: 0
EYE PAIN: 0
BACK PAIN: 0

## 2020-02-19 ENCOUNTER — APPOINTMENT (OUTPATIENT)
Dept: CT IMAGING | Age: 70
DRG: 204 | End: 2020-02-19
Payer: MEDICARE

## 2020-02-19 ENCOUNTER — HOSPITAL ENCOUNTER (INPATIENT)
Age: 70
LOS: 1 days | Discharge: ANOTHER HOSPITAL | DRG: 204 | End: 2020-02-20
Attending: EMERGENCY MEDICINE | Admitting: INTERNAL MEDICINE
Payer: MEDICARE

## 2020-02-19 PROBLEM — R91.8 LUNG MASS: Status: ACTIVE | Noted: 2020-02-19

## 2020-02-19 LAB
ALBUMIN SERPL-MCNC: 4.1 G/DL (ref 3.5–5.2)
ALP BLD-CCNC: 98 U/L (ref 40–129)
ALT SERPL-CCNC: 7 U/L (ref 0–40)
ANION GAP SERPL CALCULATED.3IONS-SCNC: 11 MMOL/L (ref 7–16)
AST SERPL-CCNC: 11 U/L (ref 0–39)
BASOPHILS ABSOLUTE: 0.02 E9/L (ref 0–0.2)
BASOPHILS RELATIVE PERCENT: 0.2 % (ref 0–2)
BILIRUB SERPL-MCNC: 0.2 MG/DL (ref 0–1.2)
BILIRUBIN URINE: NEGATIVE
BLOOD, URINE: NEGATIVE
BUN BLDV-MCNC: 15 MG/DL (ref 8–23)
CALCIUM SERPL-MCNC: 9.5 MG/DL (ref 8.6–10.2)
CHLORIDE BLD-SCNC: 104 MMOL/L (ref 98–107)
CLARITY: CLEAR
CO2: 26 MMOL/L (ref 22–29)
COLOR: YELLOW
CREAT SERPL-MCNC: 1.2 MG/DL (ref 0.7–1.2)
EKG ATRIAL RATE: 85 BPM
EKG P AXIS: 76 DEGREES
EKG P-R INTERVAL: 134 MS
EKG Q-T INTERVAL: 382 MS
EKG QRS DURATION: 98 MS
EKG QTC CALCULATION (BAZETT): 454 MS
EKG R AXIS: 27 DEGREES
EKG T AXIS: 99 DEGREES
EKG VENTRICULAR RATE: 85 BPM
EOSINOPHILS ABSOLUTE: 0.13 E9/L (ref 0.05–0.5)
EOSINOPHILS RELATIVE PERCENT: 1.3 % (ref 0–6)
GFR AFRICAN AMERICAN: >60
GFR NON-AFRICAN AMERICAN: >60 ML/MIN/1.73
GLUCOSE BLD-MCNC: 90 MG/DL (ref 74–99)
GLUCOSE URINE: NEGATIVE MG/DL
HCT VFR BLD CALC: 42 % (ref 37–54)
HEMOGLOBIN: 13.7 G/DL (ref 12.5–16.5)
IMMATURE GRANULOCYTES #: 0.02 E9/L
IMMATURE GRANULOCYTES %: 0.2 % (ref 0–5)
KETONES, URINE: NEGATIVE MG/DL
LACTIC ACID: 0.7 MMOL/L (ref 0.5–2.2)
LEUKOCYTE ESTERASE, URINE: NEGATIVE
LYMPHOCYTES ABSOLUTE: 2.23 E9/L (ref 1.5–4)
LYMPHOCYTES RELATIVE PERCENT: 22.3 % (ref 20–42)
MCH RBC QN AUTO: 29.8 PG (ref 26–35)
MCHC RBC AUTO-ENTMCNC: 32.6 % (ref 32–34.5)
MCV RBC AUTO: 91.3 FL (ref 80–99.9)
MONOCYTES ABSOLUTE: 0.79 E9/L (ref 0.1–0.95)
MONOCYTES RELATIVE PERCENT: 7.9 % (ref 2–12)
NEUTROPHILS ABSOLUTE: 6.81 E9/L (ref 1.8–7.3)
NEUTROPHILS RELATIVE PERCENT: 68.1 % (ref 43–80)
NITRITE, URINE: NEGATIVE
PDW BLD-RTO: 14.5 FL (ref 11.5–15)
PH UA: 7.5 (ref 5–9)
PLATELET # BLD: 314 E9/L (ref 130–450)
PMV BLD AUTO: 9.3 FL (ref 7–12)
POTASSIUM SERPL-SCNC: 3.8 MMOL/L (ref 3.5–5)
PRO-BNP: 192 PG/ML (ref 0–125)
PROTEIN UA: NEGATIVE MG/DL
RBC # BLD: 4.6 E12/L (ref 3.8–5.8)
SODIUM BLD-SCNC: 141 MMOL/L (ref 132–146)
SPECIFIC GRAVITY UA: <=1.005 (ref 1–1.03)
TOTAL PROTEIN: 6.6 G/DL (ref 6.4–8.3)
TROPONIN: <0.01 NG/ML (ref 0–0.03)
UROBILINOGEN, URINE: 1 E.U./DL
WBC # BLD: 10 E9/L (ref 4.5–11.5)

## 2020-02-19 PROCEDURE — 84484 ASSAY OF TROPONIN QUANT: CPT

## 2020-02-19 PROCEDURE — 6370000000 HC RX 637 (ALT 250 FOR IP): Performed by: INTERNAL MEDICINE

## 2020-02-19 PROCEDURE — 2580000003 HC RX 258: Performed by: INTERNAL MEDICINE

## 2020-02-19 PROCEDURE — 71275 CT ANGIOGRAPHY CHEST: CPT

## 2020-02-19 PROCEDURE — 83605 ASSAY OF LACTIC ACID: CPT

## 2020-02-19 PROCEDURE — 80053 COMPREHEN METABOLIC PANEL: CPT

## 2020-02-19 PROCEDURE — 93005 ELECTROCARDIOGRAM TRACING: CPT | Performed by: EMERGENCY MEDICINE

## 2020-02-19 PROCEDURE — 6370000000 HC RX 637 (ALT 250 FOR IP): Performed by: EMERGENCY MEDICINE

## 2020-02-19 PROCEDURE — 2060000000 HC ICU INTERMEDIATE R&B

## 2020-02-19 PROCEDURE — 94664 DEMO&/EVAL PT USE INHALER: CPT

## 2020-02-19 PROCEDURE — 85025 COMPLETE CBC W/AUTO DIFF WBC: CPT

## 2020-02-19 PROCEDURE — 83880 ASSAY OF NATRIURETIC PEPTIDE: CPT

## 2020-02-19 PROCEDURE — 99223 1ST HOSP IP/OBS HIGH 75: CPT | Performed by: INTERNAL MEDICINE

## 2020-02-19 PROCEDURE — 74177 CT ABD & PELVIS W/CONTRAST: CPT

## 2020-02-19 PROCEDURE — 6360000004 HC RX CONTRAST MEDICATION: Performed by: RADIOLOGY

## 2020-02-19 PROCEDURE — 94640 AIRWAY INHALATION TREATMENT: CPT

## 2020-02-19 PROCEDURE — 81003 URINALYSIS AUTO W/O SCOPE: CPT

## 2020-02-19 PROCEDURE — 99285 EMERGENCY DEPT VISIT HI MDM: CPT

## 2020-02-19 PROCEDURE — 93010 ELECTROCARDIOGRAM REPORT: CPT | Performed by: INTERNAL MEDICINE

## 2020-02-19 RX ORDER — HYDROCHLOROTHIAZIDE 12.5 MG/1
12.5 TABLET ORAL DAILY
Status: DISCONTINUED | OUTPATIENT
Start: 2020-02-20 | End: 2020-02-20 | Stop reason: HOSPADM

## 2020-02-19 RX ORDER — PANTOPRAZOLE SODIUM 40 MG/1
40 TABLET, DELAYED RELEASE ORAL DAILY
Status: DISCONTINUED | OUTPATIENT
Start: 2020-02-20 | End: 2020-02-20 | Stop reason: HOSPADM

## 2020-02-19 RX ORDER — ONDANSETRON 2 MG/ML
4 INJECTION INTRAMUSCULAR; INTRAVENOUS EVERY 6 HOURS PRN
Status: DISCONTINUED | OUTPATIENT
Start: 2020-02-19 | End: 2020-02-20 | Stop reason: HOSPADM

## 2020-02-19 RX ORDER — AMLODIPINE BESYLATE 10 MG/1
10 TABLET ORAL DAILY
Status: DISCONTINUED | OUTPATIENT
Start: 2020-02-20 | End: 2020-02-20 | Stop reason: HOSPADM

## 2020-02-19 RX ORDER — ACETAMINOPHEN 325 MG/1
650 TABLET ORAL EVERY 6 HOURS PRN
Status: DISCONTINUED | OUTPATIENT
Start: 2020-02-19 | End: 2020-02-20 | Stop reason: HOSPADM

## 2020-02-19 RX ORDER — CLONIDINE HYDROCHLORIDE 0.2 MG/1
0.2 TABLET ORAL 2 TIMES DAILY
Status: DISCONTINUED | OUTPATIENT
Start: 2020-02-19 | End: 2020-02-20 | Stop reason: HOSPADM

## 2020-02-19 RX ORDER — SODIUM CHLORIDE 0.9 % (FLUSH) 0.9 %
10 SYRINGE (ML) INJECTION EVERY 12 HOURS SCHEDULED
Status: DISCONTINUED | OUTPATIENT
Start: 2020-02-19 | End: 2020-02-20 | Stop reason: HOSPADM

## 2020-02-19 RX ORDER — IPRATROPIUM BROMIDE AND ALBUTEROL SULFATE 2.5; .5 MG/3ML; MG/3ML
1 SOLUTION RESPIRATORY (INHALATION)
Status: DISCONTINUED | OUTPATIENT
Start: 2020-02-20 | End: 2020-02-20 | Stop reason: HOSPADM

## 2020-02-19 RX ORDER — METOPROLOL SUCCINATE 50 MG/1
50 TABLET, EXTENDED RELEASE ORAL EVERY MORNING
Status: DISCONTINUED | OUTPATIENT
Start: 2020-02-20 | End: 2020-02-20 | Stop reason: HOSPADM

## 2020-02-19 RX ORDER — ACETAMINOPHEN 650 MG/1
650 SUPPOSITORY RECTAL EVERY 6 HOURS PRN
Status: DISCONTINUED | OUTPATIENT
Start: 2020-02-19 | End: 2020-02-20 | Stop reason: HOSPADM

## 2020-02-19 RX ORDER — PROMETHAZINE HYDROCHLORIDE 25 MG/1
12.5 TABLET ORAL EVERY 6 HOURS PRN
Status: DISCONTINUED | OUTPATIENT
Start: 2020-02-19 | End: 2020-02-20 | Stop reason: HOSPADM

## 2020-02-19 RX ORDER — IPRATROPIUM BROMIDE AND ALBUTEROL SULFATE 2.5; .5 MG/3ML; MG/3ML
1 SOLUTION RESPIRATORY (INHALATION)
Status: COMPLETED | OUTPATIENT
Start: 2020-02-19 | End: 2020-02-19

## 2020-02-19 RX ORDER — SODIUM CHLORIDE 0.9 % (FLUSH) 0.9 %
10 SYRINGE (ML) INJECTION PRN
Status: DISCONTINUED | OUTPATIENT
Start: 2020-02-19 | End: 2020-02-20 | Stop reason: HOSPADM

## 2020-02-19 RX ORDER — LOSARTAN POTASSIUM 50 MG/1
100 TABLET ORAL EVERY MORNING
Status: DISCONTINUED | OUTPATIENT
Start: 2020-02-20 | End: 2020-02-20 | Stop reason: HOSPADM

## 2020-02-19 RX ORDER — ASPIRIN 81 MG/1
81 TABLET ORAL DAILY
Status: DISCONTINUED | OUTPATIENT
Start: 2020-02-20 | End: 2020-02-20 | Stop reason: HOSPADM

## 2020-02-19 RX ORDER — POLYETHYLENE GLYCOL 3350 17 G/17G
17 POWDER, FOR SOLUTION ORAL DAILY PRN
Status: DISCONTINUED | OUTPATIENT
Start: 2020-02-19 | End: 2020-02-20 | Stop reason: HOSPADM

## 2020-02-19 RX ORDER — ATORVASTATIN CALCIUM 10 MG/1
10 TABLET, FILM COATED ORAL DAILY
Status: DISCONTINUED | OUTPATIENT
Start: 2020-02-20 | End: 2020-02-20 | Stop reason: HOSPADM

## 2020-02-19 RX ADMIN — IPRATROPIUM BROMIDE AND ALBUTEROL SULFATE 1 AMPULE: .5; 3 SOLUTION RESPIRATORY (INHALATION) at 14:50

## 2020-02-19 RX ADMIN — CLONIDINE HYDROCHLORIDE 0.2 MG: 0.2 TABLET ORAL at 23:09

## 2020-02-19 RX ADMIN — IPRATROPIUM BROMIDE AND ALBUTEROL SULFATE 1 AMPULE: .5; 3 SOLUTION RESPIRATORY (INHALATION) at 14:55

## 2020-02-19 RX ADMIN — ACETAMINOPHEN 650 MG: 325 TABLET ORAL at 23:09

## 2020-02-19 RX ADMIN — IOHEXOL 50 ML: 240 INJECTION, SOLUTION INTRATHECAL; INTRAVASCULAR; INTRAVENOUS; ORAL at 15:49

## 2020-02-19 RX ADMIN — IOPAMIDOL 110 ML: 755 INJECTION, SOLUTION INTRAVENOUS at 15:51

## 2020-02-19 RX ADMIN — IPRATROPIUM BROMIDE AND ALBUTEROL SULFATE 1 AMPULE: .5; 3 SOLUTION RESPIRATORY (INHALATION) at 15:00

## 2020-02-19 RX ADMIN — SODIUM CHLORIDE, PRESERVATIVE FREE 10 ML: 5 INJECTION INTRAVENOUS at 23:09

## 2020-02-19 ASSESSMENT — PAIN SCALES - GENERAL
PAINLEVEL_OUTOF10: 4
PAINLEVEL_OUTOF10: 9

## 2020-02-19 ASSESSMENT — PAIN DESCRIPTION - LOCATION
LOCATION: ABDOMEN
LOCATION: ABDOMEN

## 2020-02-19 ASSESSMENT — PAIN DESCRIPTION - FREQUENCY: FREQUENCY: INTERMITTENT

## 2020-02-19 ASSESSMENT — PAIN DESCRIPTION - DESCRIPTORS: DESCRIPTORS: ACHING

## 2020-02-19 ASSESSMENT — PAIN DESCRIPTION - PAIN TYPE: TYPE: ACUTE PAIN

## 2020-02-19 NOTE — ED PROVIDER NOTES
ED Attending  CC: No      HPI:  2/19/20,   Time: 1:45 PM         Daniel Mullen is a 81 y.o. male presenting to the ED for cough, dizziness and SOB, beginning 2 months ago. The complaint has been worsening, moderate in severity, and worsened by moderate exertion. The patient is a 51-year-old male with a known history of hypertension, coronary artery disease, primary tonsillar squamous cell carcinoma and hiatal hernia with gastroesophageal reflux disease. He is also a chronic smoker with COPD. The patient states that he had surgery in December by Dr. Lorenza Kearns for repair of his hiatal hernia. He states the surgery went generally well though ever since he has been bothered by an annoying cough. The patient states that the cough is so severe that he feels like he may have popped a new hernia in his abdomen. He states that every time he coughs now he gets severe pain in the upper abdomen and around his umbilicus. Reports he feels like his abdomen is bloated. The patient states that he is eating and drinking well. He reports that it does not feel like food is sticking. He denies any reflux symptoms. The patient states that the cough has been treated with 4 times daily breathing treatments and several new inhalers by his PCP. He started noticing increasing amounts of shortness of breath lately. The patient states that he has had a sharp pain around his right scapula since the surgery. His shortness of breath seems to be getting worse. Today he got into a severe coughing fit while drinking coffee and driving his daughter to school. He states that it was so severe that he felt dizzy and lightheaded and thought he might pass out. Ever since this morning's incident the patient states that he feels lightheaded anytime he goes to stand or walk. The patient denies any anterior chest pain. He has had no lower extremity swelling. No reported fever or chills. Patient states he does not feel ill.   The cough is generally nonproductive. He states that his breathing has been so bad that he has had almost completely stop smoking because he is unable to take a pull on his cigarettes. ROS:     Constitutional: Negative for fever and chills  HENT: See HPI  Eyes: Negative for pain, discharge and redness  Respiratory:  See HPI  Cardiovascular: See HPI  Gastrointestinal: See HPI  Genitourinary: Negative for dysuria and frequency  Musculoskeletal: Negative for back pain and arthralgia  Skin: Negative for rash and wound  Neurological: Negative for weakness and headaches  Hematological: Negative for adenopathy    All other systems reviewed and are negative      -------------------------------- PAST HISTORY ----------------------------------  Past Medical History:  has a past medical history of CAD (coronary artery disease), DDD (degenerative disc disease), Hiatal hernia with gastroesophageal reflux disease and esophagitis, Hypertension, Inguinal hernia, Intervertebral disk disease, MI (myocardial infarction) ([facility] [address] Primary tonsillar squamous cell carcinoma ([facility] [address] and Psychiatric problem. Past Surgical History:  has a past surgical history that includes Abdomen surgery; hernia repair; Colonoscopy; Dental surgery (04/04/2012); ECHO Compl W Dop Color Flow (11/26/2012); Coronary angioplasty with stent (11/27/2012); cardiovascular stress test (11/25/2012); Diagnostic Cardiac Cath Lab Procedure; Coronary angioplasty; cyst removal (03/11/2015); Upper gastrointestinal endoscopy (7/23/15); Inguinal hernia repair (11/29/2017); Upper gastrointestinal endoscopy (N/A, 3/16/2018); and Gastric fundoplication (N/A, 3/54/0624). Social History:  reports that he has been smoking cigarettes. He has a 15.50 pack-year smoking history. He has never used smokeless tobacco. He reports that he does not drink alcohol or use drugs.     Family History: family history includes Cancer in his father and mother; Diabetes in his brother; High Blood Pressure in his brother and brother. The patients home medications have been reviewed. Allergies: Patient has no known allergies.     --------------------------------- RESULTS ------------------------------------------  All laboratory and radiology results have been personally reviewed by myself   LABS:  Results for orders placed or performed during the hospital encounter of 02/19/20   Gram stain   Result Value Ref Range    Gram Stain Orderable       Gram stain performed on unspun fluid  Few Polymorphonuclear leukocytes  Epithelial cells not seen  No organisms seen     Culture, Body Fluid   Result Value Ref Range    Gram Stain Result Refer to ordered Gram stain for results    CBC Auto Differential   Result Value Ref Range    WBC 10.0 4.5 - 11.5 E9/L    RBC 4.60 3.80 - 5.80 E12/L    Hemoglobin 13.7 12.5 - 16.5 g/dL    Hematocrit 42.0 37.0 - 54.0 %    MCV 91.3 80.0 - 99.9 fL    MCH 29.8 26.0 - 35.0 pg    MCHC 32.6 32.0 - 34.5 %    RDW 14.5 11.5 - 15.0 fL    Platelets 483 417 - 688 E9/L    MPV 9.3 7.0 - 12.0 fL    Neutrophils % 68.1 43.0 - 80.0 %    Immature Granulocytes % 0.2 0.0 - 5.0 %    Lymphocytes % 22.3 20.0 - 42.0 %    Monocytes % 7.9 2.0 - 12.0 %    Eosinophils % 1.3 0.0 - 6.0 %    Basophils % 0.2 0.0 - 2.0 %    Neutrophils Absolute 6.81 1.80 - 7.30 E9/L    Immature Granulocytes # 0.02 E9/L    Lymphocytes Absolute 2.23 1.50 - 4.00 E9/L    Monocytes Absolute 0.79 0.10 - 0.95 E9/L    Eosinophils Absolute 0.13 0.05 - 0.50 E9/L    Basophils Absolute 0.02 0.00 - 0.20 E9/L   Comprehensive Metabolic Panel   Result Value Ref Range    Sodium 141 132 - 146 mmol/L    Potassium 3.8 3.5 - 5.0 mmol/L    Chloride 104 98 - 107 mmol/L    CO2 26 22 - 29 mmol/L    Anion Gap 11 7 - 16 mmol/L    Glucose 90 74 - 99 mg/dL    BUN 15 8 - 23 mg/dL    CREATININE 1.2 0.7 - 1.2 mg/dL    GFR Non-African American >60 >=60 mL/min/1.73    GFR African American >60     Calcium 9.5 8.6 - 10.2 mg/dL    Total Protein 6.6 6.4 - 8.3 g/dL    Alb 4.1 3.5 - 5.2 g/dL    Total Bilirubin 0.2 0.0 - 1.2 mg/dL    Alkaline Phosphatase 98 40 - 129 U/L    ALT 7 0 - 40 U/L    AST 11 0 - 39 U/L   Troponin   Result Value Ref Range    Troponin <0.01 0.00 - 0.03 ng/mL   Lactic Acid, Plasma   Result Value Ref Range    Lactic Acid 0.7 0.5 - 2.2 mmol/L   Urinalysis   Result Value Ref Range    Color, UA Yellow Straw/Yellow    Clarity, UA Clear Clear    Glucose, Ur Negative Negative mg/dL    Bilirubin Urine Negative Negative    Ketones, Urine Negative Negative mg/dL    Specific Gravity, UA <=1.005 1.005 - 1.030    Blood, Urine Negative Negative    pH, UA 7.5 5.0 - 9.0    Protein, UA Negative Negative mg/dL    Urobilinogen, Urine 1.0 <2.0 E.U./dL    Nitrite, Urine Negative Negative    Leukocyte Esterase, Urine Negative Negative   Brain Natriuretic Peptide   Result Value Ref Range    Pro- (H) 0 - 125 pg/mL   CBC Auto Differential   Result Value Ref Range    WBC 9.5 4.5 - 11.5 E9/L    RBC 4.18 3.80 - 5.80 E12/L    Hemoglobin 12.5 12.5 - 16.5 g/dL    Hematocrit 38.1 37.0 - 54.0 %    MCV 91.1 80.0 - 99.9 fL    MCH 29.9 26.0 - 35.0 pg    MCHC 32.8 32.0 - 34.5 %    RDW 14.5 11.5 - 15.0 fL    Platelets 575 243 - 042 E9/L    MPV 9.5 7.0 - 12.0 fL    Neutrophils % 71.9 43.0 - 80.0 %    Immature Granulocytes % 0.3 0.0 - 5.0 %    Lymphocytes % 18.3 (L) 20.0 - 42.0 %    Monocytes % 8.1 2.0 - 12.0 %    Eosinophils % 1.2 0.0 - 6.0 %    Basophils % 0.2 0.0 - 2.0 %    Neutrophils Absolute 6.81 1.80 - 7.30 E9/L    Immature Granulocytes # 0.03 E9/L    Lymphocytes Absolute 1.73 1.50 - 4.00 E9/L    Monocytes Absolute 0.77 0.10 - 0.95 E9/L    Eosinophils Absolute 0.11 0.05 - 0.50 E9/L    Basophils Absolute 0.02 0.00 - 0.20 E9/L   Comprehensive Metabolic Panel   Result Value Ref Range    Sodium 139 132 - 146 mmol/L    Potassium 3.6 3.5 - 5.0 mmol/L    Chloride 104 98 - 107 mmol/L    CO2 24 22 - 29 mmol/L    Anion Gap 11 7 - 16 mmol/L    Glucose 111 (H) 74 - 99 mg/dL    BUN 15 8 - 23 mg/dL    CREATININE 1.1 0.7 - 1.2 mg/dL    GFR Non-African American >60 >=60 mL/min/1.73    GFR African American >60     Calcium 8.8 8.6 - 10.2 mg/dL    Total Protein 6.0 (L) 6.4 - 8.3 g/dL    Alb 3.5 3.5 - 5.2 g/dL    Total Bilirubin 0.3 0.0 - 1.2 mg/dL    Alkaline Phosphatase 90 40 - 129 U/L    ALT 5 0 - 40 U/L    AST 10 0 - 39 U/L   Magnesium   Result Value Ref Range    Magnesium 1.9 1.6 - 2.6 mg/dL   Body Fluid Cell Count with Differential   Result Value Ref Range    Cell Count Fluid Type Pleural     Color, Fluid Yellow     Appearance, Fluid Cloudy     Nucl Cell, Fluid 2,356 /uL    RBC, Fluid 2,000 /uL    Neutrophil Count, Fluid 25 %    Monocyte Count, Fluid 75 %   Glucose, body fluid   Result Value Ref Range    Glucose, Fluid 143 Not Established mg/dL    Fluid Type Pleural    Lactate dehydrogenase, body fluid   Result Value Ref Range    LD, Fluid 208 Not Established U/L   pH, body fluid   Result Value Ref Range    pH, Body Fluid 7.72     Fluid Type Pleural    Protein, body fluid   Result Value Ref Range    Protein, Fluid 4.0 Not Established g/dL   Procalcitonin   Result Value Ref Range    Procalcitonin 0.09 (H) 0.00 - 0.08 ng/mL   EKG 12 Lead   Result Value Ref Range    Ventricular Rate 85 BPM    Atrial Rate 85 BPM    P-R Interval 134 ms    QRS Duration 98 ms    Q-T Interval 382 ms    QTc Calculation (Bazett) 454 ms    P Axis 76 degrees    R Axis 27 degrees    T Axis 99 degrees       RADIOLOGY:  Interpreted by Radiologist.  XR CHEST PORTABLE   Final Result      Airspace consolidation in right upper lung with loss of right lung   volume      No pneumothorax postthoracentesis         US THORACENTESIS   Final Result   Ultrasound was used for localization of right-sided   thoracentesis. .         CTA CHEST W CONTRAST   Final Result   Large infiltrative mass in the right hilum extending to the right apex   concerning for malignancy with  narrowing of the right upper lobe   bronchus and segmental branches of right padmini Barrera PA-C  02/21/20 [id number]

## 2020-02-20 ENCOUNTER — APPOINTMENT (OUTPATIENT)
Dept: ULTRASOUND IMAGING | Age: 70
DRG: 204 | End: 2020-02-20
Payer: MEDICARE

## 2020-02-20 ENCOUNTER — HOSPITAL ENCOUNTER (INPATIENT)
Age: 70
LOS: 1 days | Discharge: HOME | DRG: 204 | End: 2020-02-21
Attending: FAMILY MEDICINE | Admitting: STUDENT IN AN ORGANIZED HEALTH CARE EDUCATION/TRAINING PROGRAM
Payer: MEDICARE

## 2020-02-20 ENCOUNTER — APPOINTMENT (OUTPATIENT)
Dept: GENERAL RADIOLOGY | Age: 70
DRG: 204 | End: 2020-02-20
Payer: MEDICARE

## 2020-02-20 VITALS
OXYGEN SATURATION: 97 % | SYSTOLIC BLOOD PRESSURE: 137 MMHG | RESPIRATION RATE: 26 BRPM | HEART RATE: 92 BPM | HEIGHT: 72 IN | DIASTOLIC BLOOD PRESSURE: 80 MMHG | WEIGHT: 208.7 LBS | TEMPERATURE: 98.8 F | BODY MASS INDEX: 28.27 KG/M2

## 2020-02-20 LAB
ALBUMIN SERPL-MCNC: 3.5 G/DL (ref 3.5–5.2)
ALP BLD-CCNC: 90 U/L (ref 40–129)
ALT SERPL-CCNC: 5 U/L (ref 0–40)
ANION GAP SERPL CALCULATED.3IONS-SCNC: 11 MMOL/L (ref 7–16)
AST SERPL-CCNC: 10 U/L (ref 0–39)
BASOPHILS ABSOLUTE: 0.02 E9/L (ref 0–0.2)
BASOPHILS RELATIVE PERCENT: 0.2 % (ref 0–2)
BILIRUB SERPL-MCNC: 0.3 MG/DL (ref 0–1.2)
BUN BLDV-MCNC: 15 MG/DL (ref 8–23)
CALCIUM SERPL-MCNC: 8.8 MG/DL (ref 8.6–10.2)
CHLORIDE BLD-SCNC: 104 MMOL/L (ref 98–107)
CO2: 24 MMOL/L (ref 22–29)
CREAT SERPL-MCNC: 1.1 MG/DL (ref 0.7–1.2)
EOSINOPHILS ABSOLUTE: 0.11 E9/L (ref 0.05–0.5)
EOSINOPHILS RELATIVE PERCENT: 1.2 % (ref 0–6)
FLUID TYPE: NORMAL
FLUID TYPE: NORMAL
GFR AFRICAN AMERICAN: >60
GFR NON-AFRICAN AMERICAN: >60 ML/MIN/1.73
GLUCOSE BLD-MCNC: 111 MG/DL (ref 74–99)
GLUCOSE, FLUID: 143 MG/DL
HCT VFR BLD CALC: 38.1 % (ref 37–54)
HEMOGLOBIN: 12.5 G/DL (ref 12.5–16.5)
IMMATURE GRANULOCYTES #: 0.03 E9/L
IMMATURE GRANULOCYTES %: 0.3 % (ref 0–5)
LD, FLUID: 208 U/L
LYMPHOCYTES ABSOLUTE: 1.73 E9/L (ref 1.5–4)
LYMPHOCYTES RELATIVE PERCENT: 18.3 % (ref 20–42)
MAGNESIUM: 1.9 MG/DL (ref 1.6–2.6)
MCH RBC QN AUTO: 29.9 PG (ref 26–35)
MCHC RBC AUTO-ENTMCNC: 32.8 % (ref 32–34.5)
MCV RBC AUTO: 91.1 FL (ref 80–99.9)
MONOCYTES ABSOLUTE: 0.77 E9/L (ref 0.1–0.95)
MONOCYTES RELATIVE PERCENT: 8.1 % (ref 2–12)
NEUTROPHILS ABSOLUTE: 6.81 E9/L (ref 1.8–7.3)
NEUTROPHILS RELATIVE PERCENT: 71.9 % (ref 43–80)
PDW BLD-RTO: 14.5 FL (ref 11.5–15)
PH, BODY FLUID: 7.72
PLATELET # BLD: 274 E9/L (ref 130–450)
PMV BLD AUTO: 9.5 FL (ref 7–12)
POTASSIUM SERPL-SCNC: 3.6 MMOL/L (ref 3.5–5)
PROCALCITONIN: 0.09 NG/ML (ref 0–0.08)
PROTEIN FLUID: 4 G/DL
RBC # BLD: 4.18 E12/L (ref 3.8–5.8)
SODIUM BLD-SCNC: 139 MMOL/L (ref 132–146)
TOTAL PROTEIN: 6 G/DL (ref 6.4–8.3)
WBC # BLD: 9.5 E9/L (ref 4.5–11.5)

## 2020-02-20 PROCEDURE — 6360000002 HC RX W HCPCS: Performed by: INTERNAL MEDICINE

## 2020-02-20 PROCEDURE — 99239 HOSP IP/OBS DSCHRG MGMT >30: CPT | Performed by: INTERNAL MEDICINE

## 2020-02-20 PROCEDURE — 80053 COMPREHEN METABOLIC PANEL: CPT

## 2020-02-20 PROCEDURE — 87116 MYCOBACTERIA CULTURE: CPT

## 2020-02-20 PROCEDURE — 32555 ASPIRATE PLEURA W/ IMAGING: CPT

## 2020-02-20 PROCEDURE — 6370000000 HC RX 637 (ALT 250 FOR IP): Performed by: INTERNAL MEDICINE

## 2020-02-20 PROCEDURE — 82947 ASSAY GLUCOSE BLOOD QUANT: CPT

## 2020-02-20 PROCEDURE — 84157 ASSAY OF PROTEIN OTHER: CPT

## 2020-02-20 PROCEDURE — 87102 FUNGUS ISOLATION CULTURE: CPT

## 2020-02-20 PROCEDURE — 2500000003 HC RX 250 WO HCPCS: Performed by: INTERNAL MEDICINE

## 2020-02-20 PROCEDURE — 85025 COMPLETE CBC W/AUTO DIFF WBC: CPT

## 2020-02-20 PROCEDURE — 71045 X-RAY EXAM CHEST 1 VIEW: CPT

## 2020-02-20 PROCEDURE — 2580000003 HC RX 258: Performed by: STUDENT IN AN ORGANIZED HEALTH CARE EDUCATION/TRAINING PROGRAM

## 2020-02-20 PROCEDURE — 88112 CYTOPATH CELL ENHANCE TECH: CPT

## 2020-02-20 PROCEDURE — 88305 TISSUE EXAM BY PATHOLOGIST: CPT

## 2020-02-20 PROCEDURE — 87205 SMEAR GRAM STAIN: CPT

## 2020-02-20 PROCEDURE — 83615 LACTATE (LD) (LDH) ENZYME: CPT

## 2020-02-20 PROCEDURE — 36415 COLL VENOUS BLD VENIPUNCTURE: CPT

## 2020-02-20 PROCEDURE — 2140000000 HC CCU INTERMEDIATE R&B

## 2020-02-20 PROCEDURE — 94640 AIRWAY INHALATION TREATMENT: CPT

## 2020-02-20 PROCEDURE — 2580000003 HC RX 258: Performed by: INTERNAL MEDICINE

## 2020-02-20 PROCEDURE — 87206 SMEAR FLUORESCENT/ACID STAI: CPT

## 2020-02-20 PROCEDURE — 6360000002 HC RX W HCPCS: Performed by: STUDENT IN AN ORGANIZED HEALTH CARE EDUCATION/TRAINING PROGRAM

## 2020-02-20 PROCEDURE — 96366 THER/PROPH/DIAG IV INF ADDON: CPT

## 2020-02-20 PROCEDURE — 84145 PROCALCITONIN (PCT): CPT

## 2020-02-20 PROCEDURE — 0W993ZZ DRAINAGE OF RIGHT PLEURAL CAVITY, PERCUTANEOUS APPROACH: ICD-10-PCS | Performed by: INTERNAL MEDICINE

## 2020-02-20 PROCEDURE — 0W993ZZ DRAINAGE OF RIGHT PLEURAL CAVITY, PERCUTANEOUS APPROACH: ICD-10-PCS | Performed by: STUDENT IN AN ORGANIZED HEALTH CARE EDUCATION/TRAINING PROGRAM

## 2020-02-20 PROCEDURE — 89051 BODY FLUID CELL COUNT: CPT

## 2020-02-20 PROCEDURE — 83986 ASSAY PH BODY FLUID NOS: CPT

## 2020-02-20 PROCEDURE — APPSS60 APP SPLIT SHARED TIME 46-60 MINUTES: Performed by: NURSE PRACTITIONER

## 2020-02-20 PROCEDURE — 83735 ASSAY OF MAGNESIUM: CPT

## 2020-02-20 PROCEDURE — 87015 SPECIMEN INFECT AGNT CONCNTJ: CPT

## 2020-02-20 PROCEDURE — 87070 CULTURE OTHR SPECIMN AEROBIC: CPT

## 2020-02-20 PROCEDURE — 99223 1ST HOSP IP/OBS HIGH 75: CPT | Performed by: INTERNAL MEDICINE

## 2020-02-20 PROCEDURE — 6370000000 HC RX 637 (ALT 250 FOR IP): Performed by: STUDENT IN AN ORGANIZED HEALTH CARE EDUCATION/TRAINING PROGRAM

## 2020-02-20 PROCEDURE — 96365 THER/PROPH/DIAG IV INF INIT: CPT

## 2020-02-20 RX ORDER — POLYETHYLENE GLYCOL 3350 17 G/17G
17 POWDER, FOR SOLUTION ORAL DAILY PRN
Status: CANCELLED | OUTPATIENT
Start: 2020-02-20

## 2020-02-20 RX ORDER — AMLODIPINE BESYLATE 10 MG/1
10 TABLET ORAL DAILY
Status: DISCONTINUED | OUTPATIENT
Start: 2020-02-21 | End: 2020-02-21 | Stop reason: HOSPADM

## 2020-02-20 RX ORDER — METHYLPREDNISOLONE SODIUM SUCCINATE 125 MG/2ML
60 INJECTION, POWDER, LYOPHILIZED, FOR SOLUTION INTRAMUSCULAR; INTRAVENOUS EVERY 8 HOURS
Status: DISCONTINUED | OUTPATIENT
Start: 2020-02-21 | End: 2020-02-21 | Stop reason: HOSPADM

## 2020-02-20 RX ORDER — PANTOPRAZOLE SODIUM 40 MG/1
40 TABLET, DELAYED RELEASE ORAL DAILY
Status: DISCONTINUED | OUTPATIENT
Start: 2020-02-21 | End: 2020-02-21 | Stop reason: HOSPADM

## 2020-02-20 RX ORDER — LOSARTAN POTASSIUM 50 MG/1
100 TABLET ORAL EVERY MORNING
Status: CANCELLED | OUTPATIENT
Start: 2020-02-21

## 2020-02-20 RX ORDER — PANTOPRAZOLE SODIUM 40 MG/1
40 TABLET, DELAYED RELEASE ORAL DAILY
Status: CANCELLED | OUTPATIENT
Start: 2020-02-21

## 2020-02-20 RX ORDER — ACETAMINOPHEN 325 MG/1
650 TABLET ORAL EVERY 6 HOURS PRN
Status: DISCONTINUED | OUTPATIENT
Start: 2020-02-20 | End: 2020-02-21 | Stop reason: HOSPADM

## 2020-02-20 RX ORDER — ASPIRIN 81 MG/1
81 TABLET ORAL DAILY
Status: CANCELLED | OUTPATIENT
Start: 2020-02-21

## 2020-02-20 RX ORDER — ONDANSETRON 2 MG/ML
4 INJECTION INTRAMUSCULAR; INTRAVENOUS EVERY 6 HOURS PRN
Status: DISCONTINUED | OUTPATIENT
Start: 2020-02-20 | End: 2020-02-21 | Stop reason: HOSPADM

## 2020-02-20 RX ORDER — PROMETHAZINE HYDROCHLORIDE 25 MG/1
12.5 TABLET ORAL EVERY 6 HOURS PRN
Status: CANCELLED | OUTPATIENT
Start: 2020-02-20

## 2020-02-20 RX ORDER — METHYLPREDNISOLONE SODIUM SUCCINATE 125 MG/2ML
60 INJECTION, POWDER, LYOPHILIZED, FOR SOLUTION INTRAMUSCULAR; INTRAVENOUS EVERY 8 HOURS
Status: CANCELLED | OUTPATIENT
Start: 2020-02-20

## 2020-02-20 RX ORDER — AMLODIPINE BESYLATE 10 MG/1
10 TABLET ORAL DAILY
Status: CANCELLED | OUTPATIENT
Start: 2020-02-21

## 2020-02-20 RX ORDER — HYDROCHLOROTHIAZIDE 12.5 MG/1
12.5 TABLET ORAL DAILY
Status: DISCONTINUED | OUTPATIENT
Start: 2020-02-21 | End: 2020-02-21 | Stop reason: HOSPADM

## 2020-02-20 RX ORDER — ACETAMINOPHEN 650 MG/1
650 SUPPOSITORY RECTAL EVERY 6 HOURS PRN
Status: DISCONTINUED | OUTPATIENT
Start: 2020-02-20 | End: 2020-02-21 | Stop reason: HOSPADM

## 2020-02-20 RX ORDER — PROMETHAZINE HYDROCHLORIDE 25 MG/1
12.5 TABLET ORAL EVERY 6 HOURS PRN
Status: DISCONTINUED | OUTPATIENT
Start: 2020-02-20 | End: 2020-02-21 | Stop reason: HOSPADM

## 2020-02-20 RX ORDER — LIDOCAINE HYDROCHLORIDE 10 MG/ML
5 INJECTION, SOLUTION EPIDURAL; INFILTRATION; INTRACAUDAL; PERINEURAL ONCE
Status: COMPLETED | OUTPATIENT
Start: 2020-02-20 | End: 2020-02-20

## 2020-02-20 RX ORDER — CLONIDINE HYDROCHLORIDE 0.2 MG/1
0.2 TABLET ORAL 2 TIMES DAILY
Status: DISCONTINUED | OUTPATIENT
Start: 2020-02-20 | End: 2020-02-21 | Stop reason: HOSPADM

## 2020-02-20 RX ORDER — ACETAMINOPHEN 325 MG/1
650 TABLET ORAL EVERY 6 HOURS PRN
Status: CANCELLED | OUTPATIENT
Start: 2020-02-20

## 2020-02-20 RX ORDER — ATORVASTATIN CALCIUM 10 MG/1
10 TABLET, FILM COATED ORAL DAILY
Status: DISCONTINUED | OUTPATIENT
Start: 2020-02-21 | End: 2020-02-21 | Stop reason: HOSPADM

## 2020-02-20 RX ORDER — LOSARTAN POTASSIUM 50 MG/1
100 TABLET ORAL EVERY MORNING
Status: DISCONTINUED | OUTPATIENT
Start: 2020-02-21 | End: 2020-02-21 | Stop reason: HOSPADM

## 2020-02-20 RX ORDER — TRAMADOL HYDROCHLORIDE 50 MG/1
50 TABLET ORAL EVERY 8 HOURS PRN
Status: DISCONTINUED | OUTPATIENT
Start: 2020-02-20 | End: 2020-02-20 | Stop reason: HOSPADM

## 2020-02-20 RX ORDER — METOPROLOL SUCCINATE 50 MG/1
50 TABLET, EXTENDED RELEASE ORAL EVERY MORNING
Status: CANCELLED | OUTPATIENT
Start: 2020-02-21

## 2020-02-20 RX ORDER — ACETAMINOPHEN 650 MG/1
650 SUPPOSITORY RECTAL EVERY 6 HOURS PRN
Status: CANCELLED | OUTPATIENT
Start: 2020-02-20

## 2020-02-20 RX ORDER — CLONIDINE HYDROCHLORIDE 0.2 MG/1
0.2 TABLET ORAL 2 TIMES DAILY
Status: CANCELLED | OUTPATIENT
Start: 2020-02-20

## 2020-02-20 RX ORDER — HYDROCHLOROTHIAZIDE 12.5 MG/1
12.5 TABLET ORAL DAILY
Status: CANCELLED | OUTPATIENT
Start: 2020-02-21

## 2020-02-20 RX ORDER — ONDANSETRON 2 MG/ML
4 INJECTION INTRAMUSCULAR; INTRAVENOUS EVERY 6 HOURS PRN
Status: CANCELLED | OUTPATIENT
Start: 2020-02-20

## 2020-02-20 RX ORDER — BENZONATATE 100 MG/1
200 CAPSULE ORAL 3 TIMES DAILY PRN
Status: DISCONTINUED | OUTPATIENT
Start: 2020-02-20 | End: 2020-02-21 | Stop reason: HOSPADM

## 2020-02-20 RX ORDER — METHYLPREDNISOLONE SODIUM SUCCINATE 125 MG/2ML
60 INJECTION, POWDER, LYOPHILIZED, FOR SOLUTION INTRAMUSCULAR; INTRAVENOUS EVERY 8 HOURS
Status: DISCONTINUED | OUTPATIENT
Start: 2020-02-20 | End: 2020-02-20 | Stop reason: HOSPADM

## 2020-02-20 RX ORDER — ATORVASTATIN CALCIUM 10 MG/1
10 TABLET, FILM COATED ORAL DAILY
Status: CANCELLED | OUTPATIENT
Start: 2020-02-21

## 2020-02-20 RX ORDER — POLYETHYLENE GLYCOL 3350 17 G/17G
17 POWDER, FOR SOLUTION ORAL DAILY PRN
Status: DISCONTINUED | OUTPATIENT
Start: 2020-02-20 | End: 2020-02-21 | Stop reason: HOSPADM

## 2020-02-20 RX ORDER — TRAMADOL HYDROCHLORIDE 50 MG/1
50 TABLET ORAL EVERY 8 HOURS PRN
Status: CANCELLED | OUTPATIENT
Start: 2020-02-20

## 2020-02-20 RX ORDER — ASPIRIN 81 MG/1
81 TABLET ORAL DAILY
Status: DISCONTINUED | OUTPATIENT
Start: 2020-02-21 | End: 2020-02-21 | Stop reason: HOSPADM

## 2020-02-20 RX ORDER — IPRATROPIUM BROMIDE AND ALBUTEROL SULFATE 2.5; .5 MG/3ML; MG/3ML
1 SOLUTION RESPIRATORY (INHALATION)
Status: DISCONTINUED | OUTPATIENT
Start: 2020-02-21 | End: 2020-02-21 | Stop reason: HOSPADM

## 2020-02-20 RX ORDER — TRAMADOL HYDROCHLORIDE 50 MG/1
50 TABLET ORAL EVERY 8 HOURS PRN
Status: DISCONTINUED | OUTPATIENT
Start: 2020-02-20 | End: 2020-02-21 | Stop reason: HOSPADM

## 2020-02-20 RX ORDER — IPRATROPIUM BROMIDE AND ALBUTEROL SULFATE 2.5; .5 MG/3ML; MG/3ML
1 SOLUTION RESPIRATORY (INHALATION)
Status: CANCELLED | OUTPATIENT
Start: 2020-02-20

## 2020-02-20 RX ORDER — METOPROLOL SUCCINATE 50 MG/1
50 TABLET, EXTENDED RELEASE ORAL EVERY MORNING
Status: DISCONTINUED | OUTPATIENT
Start: 2020-02-21 | End: 2020-02-21 | Stop reason: HOSPADM

## 2020-02-20 RX ADMIN — METHYLPREDNISOLONE SODIUM SUCCINATE 60 MG: 125 INJECTION, POWDER, LYOPHILIZED, FOR SOLUTION INTRAMUSCULAR; INTRAVENOUS at 11:42

## 2020-02-20 RX ADMIN — AMLODIPINE BESYLATE 10 MG: 10 TABLET ORAL at 09:03

## 2020-02-20 RX ADMIN — CLONIDINE HYDROCHLORIDE 0.2 MG: 0.2 TABLET ORAL at 23:04

## 2020-02-20 RX ADMIN — METHYLPREDNISOLONE SODIUM SUCCINATE 60 MG: 125 INJECTION, POWDER, LYOPHILIZED, FOR SOLUTION INTRAMUSCULAR; INTRAVENOUS at 18:12

## 2020-02-20 RX ADMIN — HYDROCHLOROTHIAZIDE 12.5 MG: 12.5 TABLET ORAL at 09:03

## 2020-02-20 RX ADMIN — METOPROLOL SUCCINATE 50 MG: 50 TABLET, EXTENDED RELEASE ORAL at 09:03

## 2020-02-20 RX ADMIN — ENOXAPARIN SODIUM 40 MG: 40 INJECTION SUBCUTANEOUS at 09:03

## 2020-02-20 RX ADMIN — BENZONATATE 200 MG: 100 CAPSULE ORAL at 23:36

## 2020-02-20 RX ADMIN — IPRATROPIUM BROMIDE AND ALBUTEROL SULFATE 1 AMPULE: .5; 3 SOLUTION RESPIRATORY (INHALATION) at 15:29

## 2020-02-20 RX ADMIN — TRAMADOL HYDROCHLORIDE 50 MG: 50 TABLET, FILM COATED ORAL at 23:04

## 2020-02-20 RX ADMIN — PANTOPRAZOLE SODIUM 40 MG: 40 TABLET, DELAYED RELEASE ORAL at 09:03

## 2020-02-20 RX ADMIN — IPRATROPIUM BROMIDE AND ALBUTEROL SULFATE 1 AMPULE: .5; 3 SOLUTION RESPIRATORY (INHALATION) at 12:03

## 2020-02-20 RX ADMIN — LIDOCAINE HYDROCHLORIDE 5 ML: 10 INJECTION, SOLUTION EPIDURAL; INFILTRATION; INTRACAUDAL; PERINEURAL at 18:04

## 2020-02-20 RX ADMIN — IPRATROPIUM BROMIDE AND ALBUTEROL SULFATE 1 AMPULE: .5; 3 SOLUTION RESPIRATORY (INHALATION) at 09:14

## 2020-02-20 RX ADMIN — LOSARTAN POTASSIUM 100 MG: 50 TABLET, FILM COATED ORAL at 09:03

## 2020-02-20 RX ADMIN — AMPICILLIN SODIUM AND SULBACTAM SODIUM 3 G: 2; 1 INJECTION, POWDER, FOR SOLUTION INTRAMUSCULAR; INTRAVENOUS at 23:12

## 2020-02-20 RX ADMIN — CLONIDINE HYDROCHLORIDE 0.2 MG: 0.2 TABLET ORAL at 09:03

## 2020-02-20 RX ADMIN — SODIUM CHLORIDE, PRESERVATIVE FREE 10 ML: 5 INJECTION INTRAVENOUS at 09:03

## 2020-02-20 RX ADMIN — ASPIRIN 81 MG: 81 TABLET, COATED ORAL at 09:03

## 2020-02-20 RX ADMIN — ATORVASTATIN CALCIUM 10 MG: 10 TABLET, FILM COATED ORAL at 09:03

## 2020-02-20 RX ADMIN — TRAMADOL HYDROCHLORIDE 50 MG: 50 TABLET, FILM COATED ORAL at 09:25

## 2020-02-20 ASSESSMENT — PAIN SCALES - GENERAL
PAINLEVEL_OUTOF10: 6
PAINLEVEL_OUTOF10: 3
PAINLEVEL_OUTOF10: 4
PAINLEVEL_OUTOF10: 8
PAINLEVEL_OUTOF10: 8
PAINLEVEL_OUTOF10: 5
PAINLEVEL_OUTOF10: 6

## 2020-02-20 ASSESSMENT — PAIN DESCRIPTION - PROGRESSION
CLINICAL_PROGRESSION: NOT CHANGED

## 2020-02-20 ASSESSMENT — PAIN DESCRIPTION - LOCATION
LOCATION: BUTTOCKS
LOCATION: SHOULDER
LOCATION: FLANK
LOCATION: SHOULDER

## 2020-02-20 ASSESSMENT — PAIN DESCRIPTION - DESCRIPTORS
DESCRIPTORS: ACHING
DESCRIPTORS: DISCOMFORT
DESCRIPTORS: NAGGING;JABBING
DESCRIPTORS: ACHING;DISCOMFORT

## 2020-02-20 ASSESSMENT — PAIN DESCRIPTION - ORIENTATION
ORIENTATION: RIGHT

## 2020-02-20 ASSESSMENT — PAIN DESCRIPTION - FREQUENCY
FREQUENCY: INTERMITTENT

## 2020-02-20 ASSESSMENT — PAIN DESCRIPTION - ONSET
ONSET: ON-GOING
ONSET: ON-GOING
ONSET: UNABLE TO TELL

## 2020-02-20 ASSESSMENT — PAIN - FUNCTIONAL ASSESSMENT: PAIN_FUNCTIONAL_ASSESSMENT: ACTIVITIES ARE NOT PREVENTED

## 2020-02-20 ASSESSMENT — PAIN DESCRIPTION - PAIN TYPE
TYPE: REFERRED PAIN
TYPE: ACUTE PAIN

## 2020-02-20 NOTE — PROCEDURES
Ultrasound guided Thoracentesis Procedure Note    PATIENT: Homer Stallworth    [facility] # [id number],    DATE:  02/20/ 2020    INDICTATIONS: pleural effusion found on imaging. PRE - OPERATIVE DIAGNOSIS:  Pleural Effusion    POST - OPERATIVE DIAGNOSIS:  Right side Pleural Effusion    PERFORMED By:   Dr Karyn Searles MD Cordelia Rubinstein, MD    ASSISTANT(S):  US Technician     CONSCENT:  Verbal consent obtained. Written consent obtained. After informed consent & appropriate time out protocol was noted & obtained. Risks/Benefits/Alternatives of the procedure were discussed including: infection, bleeding, pain, & pneumothorax. THORACENTESIS PROCEDURE DETAILS:  Patient understanding: patient states understanding of the procedure being performed. Time out: Immediately prior to procedure a \"time out\" was called to verify the correct. Patient was placed in a sitting position and ultrasound was done and site of maximum fluid collection was marked. PREPARATION: Patient was prepped and draped in the usual sterile fashion. ANESTHESIA: Lidocaine 1% without epinephrine, amount varied for local control. PROCEDURE NOTE: The patient was placed in the sitting position. US was then used to rosy the fluid and depth was determined. Then the skin was prepped with Chloraprep solution and draped with sterile covers. For the procedure we used 1% buffered lidocaine to anesthetize the skin, subcutaneous tissue and parietal pleura. After adequate anesthesia was accomplished. The plural catheter was advanced over a guide into the pleural space. The fluid was obtained without any difficulties and minimal blood loss. FINDINGS/SAMPLES: We removed 100 ml of straw colored/clear clear pleural fluid. The samples was sent for analysis. COMPLICATIONS:  None; patient tolerated the procedure well. PLAN: Care will be taken to review the post procedure radiograph for pneumothorax & testing when available.     [id number] [facility]

## 2020-02-20 NOTE — ED NOTES
Assume care of pt at this time, Pt free of distress at this time, VSS. Will continue to monitor.      Thierry Tapia RN  02/19/20 1924

## 2020-02-20 NOTE — H&P
(NORVASC) 10 MG tablet take 1 tablet by mouth every morning BEFORE BREAKFAST 1/3/20  Yes Prashant Lomas MD   allopurinol (ZYLOPRIM) 100 MG tablet take 1 tablet by mouth twice a day 1/3/20  Yes Prashant Lomas MD   hydrochlorothiazide (HYDRODIURIL) 12.5 MG tablet take 1 tablet by mouth once daily 1/3/20  Yes Prashant Lomas MD   oxyCODONE-acetaminophen (PERCOCET) 7.5-325 MG per tablet take 1 tablet by mouth every 8 hours AS NEEDED FOR PAIN 11/11/19  Yes Historical Provider, MD   atorvastatin (LIPITOR) 10 MG tablet take 1 tablet by mouth at bedtime 10/10/19  Yes Prashant Lomas MD   losartan (COZAAR) 100 MG tablet take 1 tablet by mouth every morning 9/17/19  Yes Prashant Lomas MD   pantoprazole (PROTONIX) 40 MG tablet Take 1 tablet by mouth daily 8/26/19  Yes Prashant Lomas MD   albuterol sulfate HFA (PROVENTIL HFA) 108 (90 Base) MCG/ACT inhaler Inhale 2 puffs into the lungs 4 times daily 8/26/19  Yes Prashant Lomas MD   metoprolol succinate (TOPROL XL) 50 MG extended release tablet Take 1 tablet by mouth every morning 7/26/19  Yes Prashant Lomas MD   aspirin 81 MG EC tablet Take 1 tablet by mouth daily 12/27/18  Yes Prashant Lomas MD   NARCAN 4 MG/0.1ML LIQD nasal spray instill 1 spray in 1 NOSTRIL if needed for opioid overdose may re. ..  (REFER TO PRESCRIPTION NOTES). 1/3/19   Historical Provider, MD   sildenafil (VIAGRA) 100 MG tablet Take 1 tablet by mouth as needed for Erectile Dysfunction 7/31/18   Sofie Barry MD   sodium chloride (OCEAN) 0.65 % nasal spray 1 spray by Nasal route as needed for Congestion 12/21/16   Rikki Pineda DO       Allergies:    Patient has no known allergies. Social History:    reports that he has been smoking cigarettes. He has a 15.50 pack-year smoking history. He has never used smokeless tobacco. He reports that he does not drink alcohol or use drugs.     Family History:       Problem Relation Age of Onset    Cancer Mother         lung cancer    Cancer Father         lung cancer    Diabetes Brother     High Blood Pressure Brother     High Blood Pressure Brother        PHYSICAL EXAM:  Vitals:  BP (!) 163/84   Pulse 92   Temp 97.6 °F (36.4 °C)   Resp 18   Ht 6' (1.829 m)   Wt 210 lb (95.3 kg)   SpO2 96%   BMI 28.48 kg/m²   General Appearance: alert and oriented to person, place and time, well developed and well- nourished, in no acute distress  Skin: warm and dry, no rash or erythema  Head: normocephalic and atraumatic  Eyes: pupils equal, round, and reactive to light, extraocular eye movements intact, conjunctivae normal  ENT: tympanic membrane, external ear and ear canal normal bilaterally, nose without deformity, nasal mucosa and turbinates normal without polyps  Neck: supple and non-tender without mass, no thyromegaly or thyroid nodules, no cervical lymphadenopathy  Pulmonary/Chest: clear to auscultation bilaterally-rhonchi bilaterally, diminished lung sounds on the right base  Cardiovascular: normal rate, regular rhythm, normal S1 and S2   Abdomen: soft, non-tender, non-distended, normal bowel sounds, no masses or organomegaly  Extremities: no cyanosis, clubbing   Musculoskeletal: normal range of motion, no joint swelling, deformity or tenderness  Neurologic: reflexes normal and symmetric, no cranial nerve deficit,coordination and speech normal      LABS:  Recent Labs     02/19/20  1422      K 3.8      CO2 26   BUN 15   CREATININE 1.2   GLUCOSE 90   CALCIUM 9.5       Recent Labs     02/19/20  1422   WBC 10.0   RBC 4.60   HGB 13.7   HCT 42.0   MCV 91.3   MCH 29.8   MCHC 32.6   RDW 14.5      MPV 9.3       No results for input(s): POCGLU in the last 72 hours.     CBC with Differential:    Lab Results   Component Value Date    WBC 10.0 02/19/2020    RBC 4.60 02/19/2020    HGB 13.7 02/19/2020    HCT 42.0 02/19/2020     02/19/2020    MCV 91.3 02/19/2020    MCH 29.8 02/19/2020    MCHC 32.6 02/19/2020    RDW 14.5 02/19/2020 SEGSPCT 65 2012    LYMPHOPCT 22.3 2020    MONOPCT 7.9 2020    BASOPCT 0.2 2020    MONOSABS 0.79 2020    LYMPHSABS 2.23 2020    EOSABS 0.13 2020    BASOSABS 0.02 2020     CMP:    Lab Results   Component Value Date     2020    K 3.8 2020    K 3.6 2019     2020    CO2 26 2020    BUN 15 2020    CREATININE 1.2 2020    GFRAA >60 2020    LABGLOM >60 2020    GLUCOSE 90 2020    GLUCOSE 73 2012    PROT 6.6 2020    LABALBU 4.1 2020    LABALBU 4.5 2012    CALCIUM 9.5 2020    BILITOT 0.2 2020    ALKPHOS 98 2020    AST 11 2020    ALT 7 2020       Radiology: Ct Abdomen Pelvis W Iv Contrast Additional Contrast? Oral    Result Date: 2020  Patient MRN:  [id number] : 1950 Age: 81 years Gender: Male Order Date:  2020 1:45 PM EXAM: CT ABDOMEN PELVIS W IV CONTRAST number of images 377 Contrast. Isovue-370, 110 mL intravenous. Omnipaque 240, 50 mL oral Technique: Low-dose CT  acquisition technique included one of following options; 1 . Automated exposure control, 2. Adjustment of MA and or KV according to patient's size or 3. Use of iterative reconstruction. INDICATION:  Recent hiatel hernia repair  Increased cough and pain Recent hiatel hernia repair  Increased cough and pain COMPARISON: Previous CT of the chest 2009 FINDINGS: The lung bases demonstrate large right pleural effusion and atelectasis and a small left pleural effusion. Tiny pericardial effusion is present. Moderately enlarged lymph nodes are identified in the right cardiophrenic angle. The previously described  nodules in the right upper lobe medially and right middle lobe are partially identified.  Multiple cystic lesions are identified in the liver with the largest one in the left hepatic lobe measuring 4.6 x 3.2 cm and similar one in the right hepatic lobe measuring up to 4 cm with

## 2020-02-20 NOTE — PROGRESS NOTES
Received consult  Reviewed CT in details   Patient will need bronch /ebus and thoracentesis ,EBUS not available in [facility] .   He is better evaluated main campus for possible intervention   Will ask primary team in am after evaluate patient

## 2020-02-20 NOTE — DISCHARGE SUMMARY
Appearance: alert and oriented to person, place and time and in no acute distress  Skin: warm and dry  Head: normocephalic and atraumatic  Eyes: pupils equal, round, and reactive to light, extraocular eye movements intact, conjunctivae normal  Neck: neck supple and non tender without mass   Pulmonary/Chest: clear to auscultation bilaterally-  Wheezes and rales or rhonchi. Cardiovascular: normal rate, normal S1 and S2 and no carotid bruits  Abdomen: soft, non-tender, non-distended, normal bowel sounds, no masses or organomegaly  Extremities: no cyanosis, no clubbing and no edema  Neurologic: no cranial nerve deficit and speech normal    I/O last 3 completed shifts: In: 300 [P.O.:300]  Out: -   No intake/output data recorded. LABS:  Recent Labs     20  1422 20  0350    139   K 3.8 3.6    104   CO2 26 24   BUN 15 15   CREATININE 1.2 1.1   GLUCOSE 90 111*   CALCIUM 9.5 8.8       Recent Labs     20  1422 20  0350   WBC 10.0 9.5   RBC 4.60 4.18   HGB 13.7 12.5   HCT 42.0 38.1   MCV 91.3 91.1   MCH 29.8 29.9   MCHC 32.6 32.8   RDW 14.5 14.5    274   MPV 9.3 9.5       No results for input(s): POCGLU in the last 72 hours. Imaging:  Ct Abdomen Pelvis W Iv Contrast Additional Contrast? Oral    Result Date: 2020  Patient MRN:  [id number] : 1950 Age: 81 years Gender: Male Order Date:  2020 1:45 PM EXAM: CT ABDOMEN PELVIS W IV CONTRAST number of images 377 Contrast. Isovue-370, 110 mL intravenous. Omnipaque 240, 50 mL oral Technique: Low-dose CT  acquisition technique included one of following options; 1 . Automated exposure control, 2. Adjustment of MA and or KV according to patient's size or 3. Use of iterative reconstruction.  INDICATION:  Recent hiatel hernia repair  Increased cough and pain Recent hiatel hernia repair  Increased cough and pain COMPARISON: Previous CT of the chest 2009 FINDINGS: The lung bases demonstrate large right pleural effusion and atelectasis and a small left pleural effusion. Tiny pericardial effusion is present. Moderately enlarged lymph nodes are identified in the right cardiophrenic angle. The previously described  nodules in the right upper lobe medially and right middle lobe are partially identified. Multiple cystic lesions are identified in the liver with the largest one in the left hepatic lobe measuring 4.6 x 3.2 cm and similar one in the right hepatic lobe measuring up to 4 cm with additional smaller 1's which are increased since the previous examination. Gallbladder is partially distended. Spleen, pancreas, the adrenals and the kidneys are unremarkable except for multiple bilateral renal cysts measuring up to 3.4 cm. Moderately enlarged lymph nodes are identified in the upper abdomen with lymph nodes measuring up to 1 cm in the nasima liver and retroperitoneum. There is mild thickening of the GE junction. If patient has difficulty swallowing, consider endoscopy. There is vascular calcification of aorta which is ectatic measuring 2.3 cm and degenerative changes in the lumbar spine. Small fat-containing umbilical hernia is noted. Pelvis. Bladder is distended. Prostate gland is prominent measuring 4.2 x 5.3 cm with a mass effect on the bladder. There is constipation. The appendix is normal.     Large pleural effusion in right lung base with atelectasis in the right lower lobe and minimal pleural effusion in left base. Pneumonia is considered. Partially identified are multiple pulmonary nodules with  lymph nodes in the cardiophrenic angle and upper abdomen concerning for malignancy. Consider PET CT scan. Cystic lesions in liver and kidneys. There is no acute inflammation or bowel obstruction. There is thickening of the GE junction. See above.  The findings were telephoned to the ED physician     Cta Chest W Contrast    Result Date: 2020  Patient MRN:  [id number] : 1950 Age: 81 years Gender: Male Order Date:  2020 1:45 PM EXAM: CTA CHEST W CONTRAST number of images 465 including MIP coronal and sagittal reconstruction images. Contrast. Isovue-370, 110 mL intravenous. Omnipaque 240, 50 mL oral. Technique: Low-dose CT  acquisition technique included one of following options; 1 . Automated exposure control, 2. Adjustment of MA and or KV according to patient's size or 3. Use of iterative reconstruction. INDICATION:  Cough/SOB   Surgery in December Cough/SOB   Surgery in December COMPARISON: Previous examination 1/29/2009 FINDINGS: The heart and the great vessels are unremarkable. There is coronary artery calcification and a small pericardial effusion The trachea and major bronchi are patent. There are no filling defects in the main pulmonary artery and the central branches. A large infiltrative mass is identified in the right upper lobe extending to right hilum measuring 7.7 x 5.8 x 7 cm which is narrowing the right upper lobe bronchus and invading segmental branches of right upper lobe with some filling defect which is partially occluded likely due to tumor invasion. The remainder of the right lower lobe and left lower lobe and left upper lobe pulmonary arteries are free of filling defects to suggest intrinsic pulmonary embolism. Lymph nodes are identified in the mediastinum. Additionally, a 1 x 1.6 cm speculated nodule is identified in the right upper lobe and smaller 1's in measuring up to 7 mm in the right middle lobe and in the left upper lobe concerning for malignancy. Large pleural effusions and atelectasis are present in the right lower lobe. Small left pleural effusion is present. Lymph nodes are identified in the right cardiophrenic angle measuring up to 1.6 x 2.3 cm. Multiple cystic lesions are identified in the liver and kidneys. There is thickening of the GE junction. If the patient has difficulty swallowing, consider endoscopy.      Large infiltrative mass in the right hilum extending to the right apex concerning for malignancy with  narrowing of the right upper lobe bronchus and segmental branches of right upper lobe pulmonary artery which are occluded and invaded by tumor. No other central pulmonary embolism is identified. Multiple bilateral pulmonary nodules are identified which may represent metastasis. Further assessment by PET/CT scan and/or bronchoscopy is recommended. Large pleural effusion and atelectasis in the right lower lobe. The findings were telephoned to the ED physician.  ALERT:  THIS IS AN ABNORMAL REPORT       Patient Instructions:      Medication List      STOP taking these medications    naproxen 500 MG EC tablet  Commonly known as:  naproxen        ASK your doctor about these medications    albuterol sulfate  (90 Base) MCG/ACT inhaler  Commonly known as:  Proventil HFA  Inhale 2 puffs into the lungs 4 times daily     allopurinol 100 MG tablet  Commonly known as:  ZYLOPRIM  take 1 tablet by mouth twice a day     amLODIPine 10 MG tablet  Commonly known as:  NORVASC  take 1 tablet by mouth every morning BEFORE BREAKFAST     aspirin 81 MG EC tablet  Take 1 tablet by mouth daily     atorvastatin 10 MG tablet  Commonly known as:  LIPITOR  take 1 tablet by mouth at bedtime     cloNIDine 0.2 MG tablet  Commonly known as:  CATAPRES  take 1 tablet by mouth twice a day     hydrochlorothiazide 12.5 MG tablet  Commonly known as:  HYDRODIURIL  take 1 tablet by mouth once daily     ipratropium-albuterol 0.5-2.5 (3) MG/3ML Soln nebulizer solution  Commonly known as:  DUONEB  Inhale 3 mLs into the lungs every 4 hours     losartan 100 MG tablet  Commonly known as:  COZAAR  take 1 tablet by mouth every morning     metoprolol succinate 50 MG extended release tablet  Commonly known as:  TOPROL XL  Take 1 tablet by mouth every morning     Narcan 4 MG/0.1ML Liqd nasal spray  Generic drug:  naloxone     oxyCODONE-acetaminophen 7.5-325 MG per tablet  Commonly known as:  PERCOCET     pantoprazole 40 MG tablet  Commonly known

## 2020-02-20 NOTE — CONSULTS
Pulmonary [address] [facility]                             Pulmonary Consult/Progress Note :          Patient: Rajesh Flores. MRN: [id number]  : 1950      Date of Admission: .2020  1:19 PM    Consulting Physician:Dr Taqueria Cruz         Reason for Consultation: Lung mass, pleural effusion  CC :  Worsening shortness of breath and cough  HPI:   Rajesh Flores. is a 81y.o. year old with history of coronary artery disease status post stent, hypertension, tobacco abuse, he presented to the hospital with worsening cough and shortness of breath that has been going on for the last weeks    Patient states that he has been having coughing and feels dizzy, with no hemoptysis, he has also some abdominal pain,    He is known history of tonsillar cancer and he follows with ENT,    The patient has been feeling better since he was started on IV steroids here, had a CAT scan of the chest that shows right side hilar mass along with pleural effusion    Oncology was consulted      PAST MEDICAL HISTORY:   Past Medical History:   Diagnosis Date    CAD (coronary artery disease)     DDD (degenerative disc disease) 2011    Hiatal hernia with gastroesophageal reflux disease and esophagitis     Hypertension     Inguinal hernia     left  S/P repair 17    Intervertebral disk disease     [facility] [facility]     Primary tonsillar squamous cell carcinoma ([facility] [address] 2013    radiation to area    Psychiatric problem        PAST SURGICAL HISTORY:   Past Surgical History:   Procedure Laterality Date    ABDOMEN SURGERY      herni repair as child    CARDIOVASCULAR STRESS TEST  2012    abnormal stress test    COLONOSCOPY      CORONARY ANGIOPLASTY      stents x2 by Dr Olamide Davis to marginal and circumflex    CORONARY ANGIOPLASTY WITH STENT PLACEMENT  2012    primary stenting  with NAZANIN  to 1st OMB of Left CX and pimary NAZANIN to distal  Left CX    CYST REMOVAL file     Attends meetings of clubs or organizations: Not on file     Relationship status: Not on file    Intimate partner violence:     Fear of current or ex partner: Not on file     Emotionally abused: Not on file     Physically abused: Not on file     Forced sexual activity: Not on file   Other Topics Concern    Not on file   Social History Narrative    Drinks 1-2 cups of coffee daily. Social History     Tobacco Use   Smoking Status Current Every Day Smoker    Packs/day: 0.50    Years: 31.00    Pack years: 15.50    Types: Cigarettes   Smokeless Tobacco Never Used     Social History     Substance and Sexual Activity   Alcohol Use No     Social History     Substance and Sexual Activity   Drug Use No    Comment: past marijuana  use;  last used 2010               HOME MEDICATIONS:  Prior to Admission medications    Medication Sig Start Date End Date Taking?  Authorizing Provider   Umeclidinium Bromide (INCRUSE ELLIPTA) 62.5 MCG/INH AEPB Inhale 1 puff into the lungs daily 2/16/20  Yes Nica Domingo MD   ipratropium-albuterol (DUONEB) 0.5-2.5 (3) MG/3ML SOLN nebulizer solution Inhale 3 mLs into the lungs every 4 hours 1/9/20  Yes Nica Domingo MD   cloNIDine (CATAPRES) 0.2 MG tablet take 1 tablet by mouth twice a day 1/3/20  Yes Nica Domingo MD   amLODIPine (NORVASC) 10 MG tablet take 1 tablet by mouth every morning BEFORE BREAKFAST 1/3/20  Yes Nica Domingo MD   allopurinol (ZYLOPRIM) 100 MG tablet take 1 tablet by mouth twice a day 1/3/20  Yes Ncia Domingo MD   hydrochlorothiazide (HYDRODIURIL) 12.5 MG tablet take 1 tablet by mouth once daily 1/3/20  Yes Nica Domingo MD   oxyCODONE-acetaminophen (PERCOCET) 7.5-325 MG per tablet take 1 tablet by mouth every 8 hours AS NEEDED FOR PAIN 11/11/19  Yes Historical Provider, MD   atorvastatin (LIPITOR) 10 MG tablet take 1 tablet by mouth at bedtime 10/10/19  Yes Nica Domingo MD   losartan (COZAAR) 100 MG tablet take 1 tablet by 650 mg, Rectal, Q6H PRN  polyethylene glycol (GLYCOLAX) packet 17 g, 17 g, Oral, Daily PRN  promethazine (PHENERGAN) tablet 12.5 mg, 12.5 mg, Oral, Q6H PRN  ondansetron (ZOFRAN) injection 4 mg, 4 mg, Intravenous, Q6H PRN  enoxaparin (LOVENOX) injection 40 mg, 40 mg, Subcutaneous, Daily  ipratropium-albuterol (DUONEB) nebulizer solution 1 ampule, 1 ampule, Inhalation, Q4H WA    IV MEDICATIONS:      ALLERGIES:  No Known Allergies    REVIEW OF SYSTEMS:  General ROS:  No weight loss ,no fatigue     ENT ROS:   No Sore throat ,no lymphoadenopathy,no nasal stuffiness     Hematological and Lymphatic ROS:   No ecchymosis ,no tendency to bleed  Respiratory ROS:   Shortness of breath, cough  Cardiovascular ROS:   No CP,No Palpitation   Gastrointestinal ROS:   No Gi bleed,no nausea or vomiting, does have some abdominal pain     - Musculoskeletal ROS:      - no joint swelling ,no joint pain   Neurological ROS:     -no weakness or numbness    Dermatological ROS:   No skin rash ,no urticaria     PHYSICAL EXAMINATION:     VITAL SIGNS:  /80   Pulse 92   Temp 98.8 °F (37.1 °C) (Oral)   Resp 26   Ht 6' (1.829 m)   Wt 208 lb 11.2 oz (94.7 kg)   SpO2 97%   BMI 28.30 kg/m²   Wt Readings from Last 3 Encounters:   02/20/20 208 lb 11.2 oz (94.7 kg)   02/13/20 211 lb 12.8 oz (96.1 kg)   02/05/20 208 lb 9.6 oz (94.6 kg)     Temp Readings from Last 3 Encounters:   02/20/20 98.8 °F (37.1 °C) (Oral)   02/13/20 98 °F (36.7 °C) (Oral)   01/09/20 97.8 °F (36.6 °C) (Oral)     TMAX:  BP Readings from Last 3 Encounters:   02/20/20 137/80   02/13/20 110/70   02/05/20 138/68     Pulse Readings from Last 3 Encounters:   02/20/20 92   02/13/20 86   02/05/20 80           INTAKE/OUTPUTS:  I/O last 3 completed shifts:   In: 300 [P.O.:300]  Out: -     Intake/Output Summary (Last 24 hours) at 2/20/2020 1491  Last data filed at 2/20/2020 0916  Gross per 24 hour   Intake 300 ml   Output --   Net 300 ml       General Appearance: alert and oriented to person, place and time, well-developed and   well-nourished, in no acute distress   Eyes: pupils equal, round, and reactive to light, extraocular eye movements intact, conjunctivae normal and sclera anicteric   Neck: neck supple and non tender without mass, no thyromegaly, no thyroid nodules and no cervical adenopathy   Pulmonary/Chest: Rhonchi bilaterally decreased breath sounds in the right side  Cardiovascular: normal rate, regular rhythm, normal S1 and S2, no murmurs, rubs, clicks or gallops, distal pulses intact, no carotid bruits, no murmurs, no gallops, no carotid bruits and no JVD   Abdomen: obese, soft, non-tender, non-distended, normal bowel sounds, no masses or organomegaly   Extremities:*No edema or cyanosis  Musculoskeletal: normal range of motion, no joint swelling, deformity or tenderness   Neurologic: reflexes normal and symmetric, no cranial nerve deficit noted    LABS/IMAGING:    CBC:  Lab Results   Component Value Date    WBC 9.5 02/20/2020    HGB 12.5 02/20/2020    HCT 38.1 02/20/2020    MCV 91.1 02/20/2020     02/20/2020    LYMPHOPCT 18.3 (L) 02/20/2020    RBC 4.18 02/20/2020    MCH 29.9 02/20/2020    MCHC 32.8 02/20/2020    RDW 14.5 02/20/2020    NEUTOPHILPCT 71.9 02/20/2020    MONOPCT 8.1 02/20/2020    BASOPCT 0.2 02/20/2020    NEUTROABS 6.81 02/20/2020    LYMPHSABS 1.73 02/20/2020    MONOSABS 0.77 02/20/2020    EOSABS 0.11 02/20/2020    BASOSABS 0.02 02/20/2020       Recent Labs     02/20/20  0350 02/19/20  1422   WBC 9.5 10.0   HGB 12.5 13.7   HCT 38.1 42.0   MCV 91.1 91.3    314       BMP:   Recent Labs     02/19/20  1422 02/20/20  0350    139   K 3.8 3.6    104   CO2 26 24   BUN 15 15   CREATININE 1.2 1.1       MG:   Lab Results   Component Value Date    MG 1.9 02/20/2020     Ca/Phos:   Lab Results   Component Value Date    CALCIUM 8.8 02/20/2020    PHOS 3.3 05/24/2019     Amylase: No results found for: AMYLASE  Lipase: No results found for: LIPASE  LIVER PROFILE: Recent Labs     02/19/20  1422 02/20/20  0350   AST 11 10   ALT 7 5   BILITOT 0.2 0.3   ALKPHOS 98 90       PT/INR: No results for input(s): PROTIME, INR in the last 72 hours. APTT: No results for input(s): APTT in the last 72 hours.     Cardiac Enzymes:  Lab Results   Component Value Date    CVHQPUY 487 (H) 05/24/2019    CKMB 5.9 05/24/2019    TROPONINI <0.01 02/19/2020               CT Chest:    PROBLEM LIST:  Patient Active Problem List   Diagnosis    Hypertension    ED (erectile dysfunction)    CAD (coronary artery disease)    Percutaneous transluminal coronary angioplasty status    Osteoarthritis of lumbar spine    Tobacco abuse    Primary [address]illar squamous cell carcinoma ([address]    Hiatal hernia with gastroesophageal reflux disease and esophagitis    Subcutaneous cyst    Cardiomyopathy ([address]    LV dysfunction    Dyslipidemia    Non morbid obesity due to excess calories    Left inguinal hernia    GERD with esophagitis    S/P laparoscopic fundoplication    Lung mass               ASSESSMENT:  1.)  Right hilar mass, obstructing the right upper lobe  2.)  Right-sided pleural effusion  3.)  COPD  4.)  Tonsillar cancer  5.)  Possible obstructive sleep apnea  6_possible postobstructive pneumonia we will do work-up      PLAN:  *-Patient is a 57-year-old male with a history about 60 pack/year smoking, history of tonsillar cancer, he presented to the hospital with worsening symptoms of shortness of breath and cough,    He was found to have right hilar mass and pleural effusion right side,    Status post thoracentesis diagnostic as he has very tiny space between ribs, we drained about 100 cc and send it for cytology    *--The patient need assessment with bronchoscopy to assess his airway and see if there is any blockage endobronchial,    *-He is to be transferred now to [facility], if there is no spot tomorrow then we will think maybe we can do it as outpatient  Check procalcitonin, if elevated may give

## 2020-02-20 NOTE — PROGRESS NOTES
P Quality Flow/Interdisciplinary Rounds Progress Note        Quality Flow Rounds held on February 20, 2020    Disciplines Attending:  Bedside Nurse, ,  and Nursing Unit Eloy Velez 14 Suzie Carrion. was admitted on 2/19/2020  1:19 PM    Anticipated Discharge Date:       Disposition:    Juan Score:  Juan Scale Score: 22    Readmission Risk              Risk of Unplanned Readmission:        13           Discussed patient goal for the day, patient clinical progression, and barriers to discharge.   The following Goal(s) of the Day/Commitment(s) have been identified:  Other  Transfer to [place], [place]. \Hospital Has a New Name and Outlook.\"" CTR THIEF RVR FALL February 20, 2020

## 2020-02-20 NOTE — CONSULTS
Inpatient Medical Oncology Consult Note      Reason for Visit: Consultation on a patient with Right Lung Mass     Referring Physician: Casa Astudillo MD    PCP:  Shahid Helton MD    History of Present Illness:  81 y.o. male with a past medical hx of MI, HTN, DDD, CAD, and tonsillar squamous cell carcinoma. He was treated for his tonsillar cacrcinoma by Dr. Fabien Meyers at the [facility] in 2009 with Cisplatin and 5-Fu and radiation. Hx smoking 0.52 packs of cigarettes per day for 31 years. Admitted to the ED on 02/19/2020 with lung mass after complaints of cough, dizziness, and SOB beginning 2 months ago. CTA chest 02/19/2020:  Large infiltrative mass in the right hilum extending to the right apex  concerning for malignancy with narrowing of the right upper lobe  bronchus and segmental branches of right upper lobe pulmonary artery which are occluded and invaded by tumor. No other central pulmonary embolism is identified. Multiple bilateral pulmonary nodules are identified which may represent metastasis. Further assessment by PET/CT scan and/or bronchoscopy is recommended.   Large pleural effusion and atelectasis in the right lower lobe. CT abdomen/pelvis 02/19/2020:  Large pleural effusion in right lung base with atelectasis in the  right lower lobe and minimal pleural effusion in left base. Pneumonia is considered. Partially identified are multiple pulmonary nodules with  lymph nodes  in the cardiophrenic angle and upper abdomen concerning for  malignancy. Consider PET CT scan. Cystic lesions in liver and kidneys. There is no acute inflammation or bowel obstruction. There is thickening of the GE junction. Review of Systems;  CONSTITUTIONAL: No fever, chills. Good appetite . Fatigue. ENMT: Eyes: No diplopia; Nose: No epistaxis. Mouth: No sore throat. RESPIRATORY: No hemoptysis, positive shortness of breath and cough.    CARDIOVASCULAR: No chest pain, reconciled. Social History:  Social History     Socioeconomic History    Marital status: Single     Spouse name: Not on file    Number of children: Not on file    Years of education: Not on file    Highest education level: Not on file   Occupational History    Not on file   Social Needs    Financial resource strain: Not on file    Food insecurity:     Worry: Not on file     Inability: Not on file    Transportation needs:     Medical: Not on file     Non-medical: Not on file   Tobacco Use    Smoking status: Current Every Day Smoker     Packs/day: 0.50     Years: 31.00     Pack years: 15.50     Types: Cigarettes    Smokeless tobacco: Never Used   Substance and Sexual Activity    Alcohol use: No    Drug use: No     Comment: past marijuana  use;  last used 2010    Sexual activity: Yes     Partners: Female   Lifestyle    Physical activity:     Days per week: Not on file     Minutes per session: Not on file    Stress: Not on file   Relationships    Social connections:     Talks on phone: Not on file     Gets together: Not on file     Attends [employer] service: Not on file     Active member of club or organization: Not on file     Attends meetings of clubs or organizations: Not on file     Relationship status: Not on file    Intimate partner violence:     Fear of current or ex partner: Not on file     Emotionally abused: Not on file     Physically abused: Not on file     Forced sexual activity: Not on file   Other Topics Concern    Not on file   Social History Narrative    Drinks 1-2 cups of coffee daily. Allergies:  No Known Allergies    Physical Exam:  /80   Pulse 92   Temp 98.8 °F (37.1 °C) (Oral)   Resp 26   Ht 6' (1.829 m)   Wt 208 lb 11.2 oz (94.7 kg)   SpO2 97%   BMI 28.30 kg/m²   GENERAL: Alert, oriented x 3, not in acute distress. HEENT: PERRLA; EOMI. Oropharynx clear. NECK: Supple. Without lymphadenopathy. LUNGS: Expiratory wheezing bilaterally. No crackles or ronchi. CARDIOVASCULAR: Regular rate. No murmurs, rubs or gallops. ABDOMEN: Soft. Non-tender, non-distended. Positive bowel sounds. EXTREMITIES: Without clubbing, cyanosis, or edema. NEUROLOGIC: No focal deficits. ECOG PS 1    Recent Labs     02/19/20  1422 02/20/20  0350   WBC 10.0 9.5   RBC 4.60 4.18   HGB 13.7 12.5   HCT 42.0 38.1   MCV 91.3 91.1   MCH 29.8 29.9   MCHC 32.6 32.8   RDW 14.5 14.5    274   MPV 9.3 9.5        Recent Labs     02/19/20  1422 02/20/20  0350    139   K 3.8 3.6    104   CO2 26 24   BUN 15 15   CREATININE 1.2 1.1   GLUCOSE 90 111*   CALCIUM 9.5 8.8   PROT 6.6 6.0*   LABALBU 4.1 3.5   BILITOT 0.2 0.3   ALKPHOS 98 90   AST 11 10   ALT 7 5        Impression/Plan:  81 y.o. male with a past medical hx of MI, HTN, DDD, CAD, and tonsillar squamous cell carcinoma. He was treated for his tonsillar cacrcinoma by Dr. Sandra Swain at the [facility] in 2009 with Cisplatin and 5-Fu and radiation. Hx smoking 0.52 packs of cigarettes per day for 31 years. Admitted to the ED on 02/19/2020 with lung mass after complaints of cough, dizziness, and SOB beginning 2 months ago. CTA chest 02/19/2020:  Large infiltrative mass in the right hilum extending to the right apex  concerning for malignancy with narrowing of the right upper lobe  bronchus and segmental branches of right upper lobe pulmonary artery which are occluded and invaded by tumor. No other central pulmonary embolism is identified. Multiple bilateral pulmonary nodules are identified which may represent metastasis. Further assessment by PET/CT scan and/or bronchoscopy is recommended.   Large pleural effusion and atelectasis in the right lower lobe. CT abdomen/pelvis 02/19/2020:  Large pleural effusion in right lung base with atelectasis in the  right lower lobe and minimal pleural effusion in left base. Pneumonia is considered.   Partially identified are multiple pulmonary nodules with  lymph nodes  in the cardiophrenic angle and upper abdomen concerning for  malignancy. Consider PET CT scan. Cystic lesions in liver and kidneys. There is no acute inflammation or bowel obstruction. There is thickening of the GE junction.     -Pulmonology team on board-Bronch/EBUS and thoracentesis to be done  (will need transported to [address] [place] [address])  -Will wait for pathology following procedures  -PET/CT as OP  -Possible MRI brain to complete staging  -Will continue to follow    Thank you for allowing us to participate in the care of [employer].      DAWSON Freitas CNP  [facility] MED ONCOLOGY  [address] [place] [address]  February 20, 2020

## 2020-02-21 ENCOUNTER — APPOINTMENT (OUTPATIENT)
Dept: MRI IMAGING | Age: 70
DRG: 204 | End: 2020-02-21
Attending: FAMILY MEDICINE
Payer: MEDICARE

## 2020-02-21 VITALS
WEIGHT: 204 LBS | TEMPERATURE: 97.6 F | RESPIRATION RATE: 18 BRPM | HEART RATE: 93 BPM | HEIGHT: 72 IN | OXYGEN SATURATION: 94 % | BODY MASS INDEX: 27.63 KG/M2 | SYSTOLIC BLOOD PRESSURE: 150 MMHG | DIASTOLIC BLOOD PRESSURE: 70 MMHG

## 2020-02-21 PROBLEM — Z85.818 HISTORY OF CANCER TONSIL: Status: ACTIVE | Noted: 2020-02-21

## 2020-02-21 PROBLEM — Z98.890 HX OF CARDIAC CATH: Status: ACTIVE | Noted: 2020-02-21

## 2020-02-21 PROBLEM — J90 PLEURAL EFFUSION: Status: ACTIVE | Noted: 2020-02-21

## 2020-02-21 PROBLEM — J18.9 PNEUMONIA: Status: ACTIVE | Noted: 2020-02-21

## 2020-02-21 LAB
GRAM STAIN ORDERABLE: NORMAL
LACTATE DEHYDROGENASE: 126 U/L (ref 135–225)

## 2020-02-21 PROCEDURE — 99223 1ST HOSP IP/OBS HIGH 75: CPT | Performed by: INTERNAL MEDICINE

## 2020-02-21 PROCEDURE — 2580000003 HC RX 258: Performed by: INTERNAL MEDICINE

## 2020-02-21 PROCEDURE — 6360000002 HC RX W HCPCS: Performed by: INTERNAL MEDICINE

## 2020-02-21 PROCEDURE — 6370000000 HC RX 637 (ALT 250 FOR IP): Performed by: INTERNAL MEDICINE

## 2020-02-21 PROCEDURE — 99235 HOSP IP/OBS SAME DATE MOD 70: CPT | Performed by: FAMILY MEDICINE

## 2020-02-21 PROCEDURE — 2580000003 HC RX 258: Performed by: STUDENT IN AN ORGANIZED HEALTH CARE EDUCATION/TRAINING PROGRAM

## 2020-02-21 PROCEDURE — 94640 AIRWAY INHALATION TREATMENT: CPT

## 2020-02-21 PROCEDURE — 6370000000 HC RX 637 (ALT 250 FOR IP): Performed by: STUDENT IN AN ORGANIZED HEALTH CARE EDUCATION/TRAINING PROGRAM

## 2020-02-21 PROCEDURE — 99232 SBSQ HOSP IP/OBS MODERATE 35: CPT | Performed by: INTERNAL MEDICINE

## 2020-02-21 PROCEDURE — 70553 MRI BRAIN STEM W/O & W/DYE: CPT

## 2020-02-21 PROCEDURE — 6360000004 HC RX CONTRAST MEDICATION: Performed by: RADIOLOGY

## 2020-02-21 PROCEDURE — 83615 LACTATE (LD) (LDH) ENZYME: CPT

## 2020-02-21 PROCEDURE — 36415 COLL VENOUS BLD VENIPUNCTURE: CPT

## 2020-02-21 PROCEDURE — 96361 HYDRATE IV INFUSION ADD-ON: CPT

## 2020-02-21 PROCEDURE — 6360000002 HC RX W HCPCS: Performed by: STUDENT IN AN ORGANIZED HEALTH CARE EDUCATION/TRAINING PROGRAM

## 2020-02-21 PROCEDURE — 96375 TX/PRO/DX INJ NEW DRUG ADDON: CPT

## 2020-02-21 PROCEDURE — 96376 TX/PRO/DX INJ SAME DRUG ADON: CPT

## 2020-02-21 PROCEDURE — A9579 GAD-BASE MR CONTRAST NOS,1ML: HCPCS | Performed by: RADIOLOGY

## 2020-02-21 RX ORDER — AMOXICILLIN AND CLAVULANATE POTASSIUM 250; 62.5 MG/5ML; MG/5ML
250 POWDER, FOR SUSPENSION ORAL 2 TIMES DAILY
Qty: 70 ML | Refills: 0 | Status: SHIPPED | OUTPATIENT
Start: 2020-02-21 | End: 2020-02-28

## 2020-02-21 RX ORDER — PREDNISONE 20 MG/1
40 TABLET ORAL DAILY
Qty: 10 TABLET | Refills: 0 | Status: SHIPPED | OUTPATIENT
Start: 2020-02-21 | End: 2020-02-26

## 2020-02-21 RX ORDER — NICOTINE 21 MG/24HR
1 PATCH, TRANSDERMAL 24 HOURS TRANSDERMAL DAILY
Qty: 30 PATCH | Refills: 3 | Status: SHIPPED | OUTPATIENT
Start: 2020-02-22 | End: 2020-07-07

## 2020-02-21 RX ORDER — NICOTINE 21 MG/24HR
1 PATCH, TRANSDERMAL 24 HOURS TRANSDERMAL DAILY
Status: DISCONTINUED | OUTPATIENT
Start: 2020-02-21 | End: 2020-02-21 | Stop reason: HOSPADM

## 2020-02-21 RX ORDER — SODIUM CHLORIDE 0.9 % (FLUSH) 0.9 %
10 SYRINGE (ML) INJECTION PRN
Status: DISCONTINUED | OUTPATIENT
Start: 2020-02-21 | End: 2020-02-21 | Stop reason: HOSPADM

## 2020-02-21 RX ORDER — BENZONATATE 200 MG/1
200 CAPSULE ORAL 3 TIMES DAILY PRN
Qty: 90 CAPSULE | Refills: 0 | Status: SHIPPED | OUTPATIENT
Start: 2020-02-21 | End: 2020-03-22

## 2020-02-21 RX ORDER — PREDNISONE 20 MG/1
20 TABLET ORAL 2 TIMES DAILY
Status: ON HOLD | COMMUNITY
Start: 2020-01-28 | End: 2020-02-21 | Stop reason: HOSPADM

## 2020-02-21 RX ORDER — IPRATROPIUM BROMIDE AND ALBUTEROL SULFATE 2.5; .5 MG/3ML; MG/3ML
1 SOLUTION RESPIRATORY (INHALATION) EVERY 4 HOURS PRN
Qty: 360 ML | Refills: 5 | Status: SHIPPED | OUTPATIENT
Start: 2020-02-21 | End: 2020-06-03 | Stop reason: SDUPTHER

## 2020-02-21 RX ORDER — SODIUM CHLORIDE 0.9 % (FLUSH) 0.9 %
10 SYRINGE (ML) INJECTION EVERY 12 HOURS SCHEDULED
Status: DISCONTINUED | OUTPATIENT
Start: 2020-02-21 | End: 2020-02-21 | Stop reason: HOSPADM

## 2020-02-21 RX ADMIN — LOSARTAN POTASSIUM 100 MG: 50 TABLET ORAL at 09:39

## 2020-02-21 RX ADMIN — ENOXAPARIN SODIUM 40 MG: 40 INJECTION SUBCUTANEOUS at 09:39

## 2020-02-21 RX ADMIN — SODIUM CHLORIDE, PRESERVATIVE FREE 10 ML: 5 INJECTION INTRAVENOUS at 10:55

## 2020-02-21 RX ADMIN — AMPICILLIN SODIUM AND SULBACTAM SODIUM 3 G: 2; 1 INJECTION, POWDER, FOR SOLUTION INTRAMUSCULAR; INTRAVENOUS at 02:57

## 2020-02-21 RX ADMIN — AMPICILLIN SODIUM AND SULBACTAM SODIUM 3 G: 2; 1 INJECTION, POWDER, FOR SOLUTION INTRAMUSCULAR; INTRAVENOUS at 10:52

## 2020-02-21 RX ADMIN — GADOTERIDOL 10 ML: 279.3 INJECTION, SOLUTION INTRAVENOUS at 16:24

## 2020-02-21 RX ADMIN — ATORVASTATIN CALCIUM 10 MG: 10 TABLET, FILM COATED ORAL at 09:39

## 2020-02-21 RX ADMIN — METHYLPREDNISOLONE SODIUM SUCCINATE 60 MG: 125 INJECTION, POWDER, FOR SOLUTION INTRAMUSCULAR; INTRAVENOUS at 10:52

## 2020-02-21 RX ADMIN — CLONIDINE HYDROCHLORIDE 0.2 MG: 0.2 TABLET ORAL at 09:39

## 2020-02-21 RX ADMIN — IPRATROPIUM BROMIDE AND ALBUTEROL SULFATE 1 AMPULE: 2.5; .5 SOLUTION RESPIRATORY (INHALATION) at 11:40

## 2020-02-21 RX ADMIN — AMLODIPINE BESYLATE 10 MG: 10 TABLET ORAL at 09:39

## 2020-02-21 RX ADMIN — ASPIRIN 81 MG: 81 TABLET, COATED ORAL at 09:39

## 2020-02-21 RX ADMIN — METHYLPREDNISOLONE SODIUM SUCCINATE 60 MG: 125 INJECTION, POWDER, FOR SOLUTION INTRAMUSCULAR; INTRAVENOUS at 01:27

## 2020-02-21 RX ADMIN — PANTOPRAZOLE SODIUM 40 MG: 40 TABLET, DELAYED RELEASE ORAL at 09:39

## 2020-02-21 RX ADMIN — IPRATROPIUM BROMIDE AND ALBUTEROL SULFATE 1 AMPULE: 2.5; .5 SOLUTION RESPIRATORY (INHALATION) at 08:09

## 2020-02-21 RX ADMIN — HYDROCHLOROTHIAZIDE 12.5 MG: 12.5 TABLET ORAL at 09:39

## 2020-02-21 RX ADMIN — METOPROLOL SUCCINATE 50 MG: 50 TABLET, EXTENDED RELEASE ORAL at 09:39

## 2020-02-21 ASSESSMENT — PAIN SCALES - GENERAL
PAINLEVEL_OUTOF10: 2
PAINLEVEL_OUTOF10: 0

## 2020-02-21 ASSESSMENT — PAIN DESCRIPTION - PROGRESSION: CLINICAL_PROGRESSION: GRADUALLY IMPROVING

## 2020-02-21 NOTE — CONSULTS
Inpatient Medical Oncology Consult Note      Reason for Visit: Consultation on a patient with Right Lung Mass     Referring Physician: Tacos Hdez MD    PCP:  Lia dEge MD    History of Present Illness:  81 y.o. male with a past medical hx of MI, HTN, DDD, CAD, and tonsillar squamous cell carcinoma. He was treated for his tonsillar cacrcinoma by Dr. Tha Bustamante at the [facility] in 2009 with Cisplatin and 5-Fu and radiation. Hx smoking 0.52 packs of cigarettes per day for 31 years. Admitted to the ED on 02/19/2020 with lung mass after complaints of cough, dizziness, and SOB beginning 2 months ago. CTA chest 02/19/2020:  Large infiltrative mass in the right hilum extending to the right apex  concerning for malignancy with narrowing of the right upper lobe  bronchus and segmental branches of right upper lobe pulmonary artery which are occluded and invaded by tumor. No other central pulmonary embolism is identified. Multiple bilateral pulmonary nodules are identified which may represent metastasis. Further assessment by PET/CT scan and/or bronchoscopy is recommended.   Large pleural effusion and atelectasis in the right lower lobe. CT abdomen/pelvis 02/19/2020:  Large pleural effusion in right lung base with atelectasis in the  right lower lobe and minimal pleural effusion in left base. Pneumonia is considered. Partially identified are multiple pulmonary nodules with  lymph nodes  in the cardiophrenic angle and upper abdomen concerning for  malignancy. Consider PET CT scan. Cystic lesions in liver and kidneys. There is no acute inflammation or bowel obstruction. There is thickening of the GE junction. He is s/p right thoracentesis on 2/20/2020, 100 mL of straw-colored/clear pleural fluid removed, await cytology results. Review of Systems;  CONSTITUTIONAL: No fever, chills. Good appetite . Fatigue. ENMT: Eyes: No diplopia; Nose: No epistaxis.  Mouth: No Brother     High Blood Pressure Brother     High Blood Pressure Brother        Medications:  Reviewed and reconciled. Social History:  Social History     Socioeconomic History    Marital status: Single     Spouse name: Not on file    Number of children: Not on file    Years of education: Not on file    Highest education level: Not on file   Occupational History    Not on file   Social Needs    Financial resource strain: Not on file    Food insecurity:     Worry: Not on file     Inability: Not on file    Transportation needs:     Medical: Not on file     Non-medical: Not on file   Tobacco Use    Smoking status: Current Every Day Smoker     Packs/day: 0.50     Years: 31.00     Pack years: 15.50     Types: Cigarettes    Smokeless tobacco: Never Used   Substance and Sexual Activity    Alcohol use: No    Drug use: No     Comment: past marijuana  use;  last used 2010    Sexual activity: Yes     Partners: Female   Lifestyle    Physical activity:     Days per week: Not on file     Minutes per session: Not on file    Stress: Not on file   Relationships    Social connections:     Talks on phone: Not on file     Gets together: Not on file     Attends Alevism service: Not on file     Active member of club or organization: Not on file     Attends meetings of clubs or organizations: Not on file     Relationship status: Not on file    Intimate partner violence:     Fear of current or ex partner: Not on file     Emotionally abused: Not on file     Physically abused: Not on file     Forced sexual activity: Not on file   Other Topics Concern    Not on file   Social History Narrative    Drinks 1-2 cups of coffee daily. Allergies:  No Known Allergies    Physical Exam:  BP (!) 150/70   Pulse 93   Temp 97.6 °F (36.4 °C) (Oral)   Resp 18   Ht 6' (1.829 m)   Wt 204 lb (92.5 kg)   BMI 27.67 kg/m²   GENERAL: Alert, oriented x 3, not in acute distress. HEENT: PERRLA; EOMI. Oropharynx clear.    NECK:

## 2020-02-21 NOTE — CARE COORDINATION
Lele. Update : rec'd order that pt needs a nebulizer. Pt had no preference for DME . Dung De La Cruz at [facility] and she will put in order for nebulizer but states on call will need to deliver due to pt. Going for testing and end of shift [place] [facility] Notifies nurse that pt will need to wait for delivery before discharge today.

## 2020-02-21 NOTE — H&P
instill 1 spray in 1 NOSTRIL if needed for opioid overdose may re. ..  (REFER TO PRESCRIPTION NOTES). 1/3/19   Historical Provider, MD   aspirin 81 MG EC tablet Take 1 tablet by mouth daily 12/27/18   Vangie Mcgowan MD   sildenafil (VIAGRA) 100 MG tablet Take 1 tablet by mouth as needed for Erectile Dysfunction 7/31/18   Magdalena Staples MD   sodium chloride (OCEAN) 0.65 % nasal spray 1 spray by Nasal route as needed for Congestion 12/21/16   Mira Adair DO       Allergies:  Patient has no known allergies. Social History:   TOBACCO:   reports that he has been smoking cigarettes. He has a 15.50 pack-year smoking history. He has never used smokeless tobacco.  ETOH:   reports no history of alcohol use. OCCUPATION:      Family History:       Problem Relation Age of Onset   [facility] Cancer Mother         lung cancer    Cancer Father         lung cancer    Diabetes Brother     High Blood Pressure Brother     High Blood Pressure Brother        REVIEW OF SYSTEMS:  · Constitutional: No fever, no chill or change in weight; good appetite  · HEENT: No blurred vision, no ear problems, no sore throat, no running nose. · Respiratory: As stated in HPI  · Cardiology: No angina, no dyspnea on exertion, no paroxysmal nocturnal dyspnea, no orthopnea, no palpitation, no leg swelling. · Gastroenterology: No dysphagia, no reflux; no abdominal pain, no nausea or vomiting; no constipation or diarrhea. No blood in stool. · Genitourinary: No dysuria, no frequency, hesitancy; no hematuria  · Musculoskeletal: no joint pain,  no change in range of movement. Pain in stomach, and back due to coughing and thoracentesis respectively. · Neurology: no focal weakness in extremities, no slurred speech, no double vision, no tingling or numbness sensation.    · Endocrinology: no temperature intolerance, no polyphagia, polydipsia or polyuria  · Hematology: no increased bleeding, no bruising, no lymphadenopathy  · Skin: no skin change noticed by Studies:    Xr Chest Portable    Result Date: 2020  Patient MRN: [id number] : 1950 Age:  81 years Gender: Male Order Date: 2020 5:45 PM Exam: XR CHEST PORTABLE Number of Images: 1 view Indication:   Thoracentesis Thoracentesis Comparison: Prior chest radiograph from 07/10/2018 is available. Findings: Study demonstrate a cardiac silhouettes to be within the limits of normal. There is airspace consolidation in the right upper lung. There is loss of right lung lung volume. The left lung appears to be clear. The bony thorax demonstrate no gross abnormality. There is no pneumothorax postthoracentesis. Airspace consolidation in right upper lung with loss of right lung volume No pneumothorax postthoracentesis     Us Thoracentesis    Result Date: 2020  Patient MRN:  [id number] : 1950 Age: 81 years Gender: Male Order Date:  2020 2:00 PM EXAM: US THORACENTESIS NUMBER OF IMAGES:  6 INDICATION:  Pleural Effusion Pleural Effusion please p Ultrasound was used for localization of right-sided thoracentesis which demonstrate moderate right-sided pleural effusion. [facility] Ultrasound was used for localization of right-sided thoracentesis. [facility] Resident's Assessment and PLan     Right lung mass w  Pleural Effusion   -CT scan positive for right lung mass with pleural effusion, and multiple bilateral pulmonary nodules which is possible metastasis, and mediastinal Lymphadenopathy  -Thoracentesis performed at [facility]-F/U pleural fluid studies  -Transferred to main [facility] for bronchoscopy and EBUS  -Continue Unasyn for possible postobstructive pneumonia. PCT was low so may be stopped by pulmonology.  -Pulmonolgy reconsulted  -Heme/Onc had been consulted. They suggest PET/CT as an outpatient, possible brain MRI. Consulted.   -Tessalon PRN for cough    Wheezing  Possible COPD exacerbation  -Continue Solumedrol and Duonebs for now     Tobacco Abuse  -Smokes about 10 cigarettes a day, and wants to quit

## 2020-02-22 NOTE — PROGRESS NOTES
[facility]  Family Medicine Attending    S: 81 y.o. male with history of tonsillar CA, HTN , chronic cough, cAD presenting to the [facility] ER due to Shortness of breath and presyncope following coughing fit. Was found int he ER to hve large right lung mass with pleural effusion. Is now s/p thoracentesis. Currently patient has mild sob. Denies any pain. Has persistent cough that is worse with movement and sitting up. O: VS- Blood pressure (!) 150/70, pulse 93, temperature 97.6 °F (36.4 °C), temperature source Oral, resp. rate 18, height 6' (1.829 m), weight 204 lb (92.5 kg), SpO2 94 %. Exam is as noted by resident with the following changes, additions or corrections:  Gen - lying in bed, NAD  Cardio - RRR, no murmur   Lungs - bilateral rhonchi and diffuse wheezing. Coughing with deep inspiration. Abd - BS+, soft, NT/ND  Ext- no peripheral edema     Impressions: Active Problems:    Hypertension    CAD (coronary artery disease)    Dyslipidemia    Mass of right lung    Pleural effusion    History of cancer tonsil    Hx of cardiac cath    Pneumonia  Resolved Problems:    * No resolved hospital problems. *      Plan:   Patient with large right lung mass s/p thoracentesis. Cytology pending. Currently on room air. Plan for bronch an EBUS as outpatient. D/c today. Continue Abx , augmentin for total of 7 days. Nebulizer rx given. Continue prednisone for 4 days. . Dr. Nikolai Cooney consulted. MRI brain pending. Plan for PET CT as outpatient. Attending Physician Statement  I have reviewed the chart, including any radiology or labs, and seen the patient with the resident(s). I personally reviewed and performed key elements of the history and exam.  I agree with the assessment, plan and orders as documented by the resident. Please refer to the resident note for additional information. Peace Flores.  Laura
New pulmonology consult placed to Dr. Julisa Posadas.
[facility] - Family Medicine Inpatient   Resident Progress Note    S:  Hospital day: 1    Brief Synopsis: Jazzmine Servin is a 81 y.o. man who was transferred from [facility] after being found to have a right pleural effusion and lung masses- sent to [facility] for EUS and Bronchoscopy. S/p thoracentesis (2/20).  Felt warm earlier this morning has fatigue. Pain at site of thoracentesis. Cont meds:   Scheduled meds:    enoxaparin  40 mg Subcutaneous Daily    amLODIPine  10 mg Oral Daily    aspirin  81 mg Oral Daily    atorvastatin  10 mg Oral Daily    cloNIDine  0.2 mg Oral BID    hydrochlorothiazide  12.5 mg Oral Daily    ipratropium-albuterol  1 ampule Inhalation Q4H WA    losartan  100 mg Oral QAM    methylPREDNISolone  60 mg Intravenous Q8H    metoprolol succinate  50 mg Oral QAM    pantoprazole  40 mg Oral Daily    ampicillin-sulbactam  3 g Intravenous Q6H     PRN meds: acetaminophen, acetaminophen, ondansetron, polyethylene glycol, promethazine, traMADol, benzonatate     I reviewed the patient's past medical and surgical history, Medications and Allergies. O:  BP (!) 160/88   Pulse 88   Temp 98.6 °F (37 °C) (Oral)   Resp 20   Ht 6' (1.829 m)   Wt 204 lb (92.5 kg)   BMI 27.67 kg/m²   24 hour I&O: I/O last 3 completed shifts: In: 504 [P.O.:504]  Out: 1200 [Urine:1200]  No intake/output data recorded. Physical Exam   Constitutional: He is oriented to person, place, and time. He appears well-developed and well-nourished. HENT:   Head: Normocephalic and atraumatic. Eyes: EOM are normal.   Neck: Neck supple. Pulmonary/Chest: Effort normal. He has wheezes. He has no rales. Diffuse wheezes. Minimal breath sounds jose francisco on right lobe   Abdominal: Bowel sounds are normal. He exhibits no distension. There is no abdominal tenderness. Musculoskeletal: Normal range of motion. General: No edema. Lymphadenopathy:     He has no cervical adenopathy.    Neurological: He is alert and
minutes
angioplasty status    Osteoarthritis of lumbar spine    Tobacco abuse    Primary tonsillar squamous cell carcinoma (HCC)    Hiatal hernia with gastroesophageal reflux disease and esophagitis    Subcutaneous cyst    Cardiomyopathy ([address]    LV dysfunction    Dyslipidemia    Non morbid obesity due to excess calories    Left inguinal hernia    GERD with esophagitis    S/P laparoscopic fundoplication    Mass of right lung    Pleural effusion    History of cancer tonsil    Hx of cardiac cath    Pneumonia    COPD exacerbation (HCC)               ASSESSMENT:  1.)  Right hilar mass, obstructing the right upper lobe  2.)  Right-sided pleural effusion  3.)  COPD  4.)  Tonsillar cancer  5.)  Possible obstructive sleep apnea  6_possible postobstructive pneumonia we will do work-up      PLAN:  *-Patient is a 57-year-old male with a history about 61 pack/year smoking, history of tonsillar cancer, he presented to the hospital with worsening symptoms of shortness of breath and cough,    He was found to have right hilar mass and pleural effusion right side,    Status post thoracentesis diagnostic as he has very tiny space between ribs, we drained about 100 cc and send it for cytology    *--The patient need assessment with bronchoscopy to assess his airway and see if there is any blockage endobronchial, as well on Friday, 28 February    Please follow-up on the fluid analysis as if it is positive we may not need to proceed with any further diagnostic tests unless just bronchoscopy to assess for endobronchial blockage and if needed intervention       [address]     NOTE: This report was transcribed using voice recognition software. Every effort was made to ensure accuracy; however, inadvertent computerized transcription errors may be present.

## 2020-02-23 LAB
BODY FLUID CULTURE, STERILE: NORMAL
GRAM STAIN RESULT: NORMAL

## 2020-02-24 LAB
APPEARANCE FLUID: NORMAL
CELL COUNT FLUID TYPE: NORMAL
COLOR FLUID: YELLOW
MONOCYTE, FLUID: 75 %
NEUTROPHIL, FLUID: 25 %
NUCLEATED CELLS FLUID: 2356 /UL
RBC FLUID: 2000 /UL

## 2020-02-25 RX ORDER — ALBUTEROL SULFATE 90 UG/1
2 AEROSOL, METERED RESPIRATORY (INHALATION) 4 TIMES DAILY
Qty: 1 INHALER | Refills: 3 | Status: SHIPPED | OUTPATIENT
Start: 2020-02-25 | End: 2020-06-03 | Stop reason: SDUPTHER

## 2020-02-25 NOTE — PROGRESS NOTES
are to spend the night in the hospital.     PARKING INSTRUCTIONS:   [x] Arrival Time:_____1300________  · [x] Parking lot '\"I\"  is located on [facility] (the corner of [facility] and [facility]). To enter, press the button and the gate will lift. A free token will be provided to exit the lot. One car per patient is allowed to park in this lot. All other cars are to park on [address] [place] [address] either in the parking garage or the handicap lot. [] To reach the [facility] lobby from [address] [place] [address], upon entering the hospital, take elevator B to the 3rd floor. EDUCATION INSTRUCTIONS:      [] Knee or hip replacement booklet & exercise pamphlets given. [] Carlos [address] placed in chart. [] Pre-admission Testing educational folder given  [] Incentive Spirometry,coughing & deep breathing exercises reviewed. []Medication information sheet(s)   [x]Fluoroscopy-Xray used in surgery reviewed with patient. Educational pamphlet placed in chart. [x]Pain: Post-op pain is normal and to be expected. You will be asked to rate your pain from 0-10(a zero is not acceptable-education is needed). Your post-op pain goal is:2[] Ask your nurse for your pain medication. [] Joint camp offered. [] Joint replacement booklets given. [] Other:___________________________    MEDICATION INSTRUCTIONS:   [x]Bring a complete list of your medications, please write the last time you took the medicine, give this list to the nurse. [x] Take the following medications the morning of surgery with 1-2 ounces of water:   [] Stop herbal supplements and vitamins 5 days before your surgery. [] DO NOT take any diabetic medicine the morning of surgery. Follow instructions for insulin the day before surgery. [] If you are diabetic and your blood sugar is low or you feel symptomatic, you may drink 1-2 ounces of apple juice or take a glucose tablet.   The morning of your procedure, you may call the pre-op area if you have concerns about your blood sugar [phone]. [] Use your inhalers the morning of surgery. Bring your emergency inhaler with you day of surgery. [] Follow physician instructions regarding any blood thinners you may be taking. WHAT TO EXPECT:  [x] The day of surgery you will be greeted and checked in by the [employer].  In addition, you will be registered in the [place] by a Patient Access Representative. Please bring your photo ID and insurance card. A nurse will greet you in accordance to the time you are needed in the pre-op area to prepare you for surgery. Please do not be discouraged if you are not greeted in the order you arrive as there are many variables that are involved in patient preparation. Your patience is greatly appreciated as you wait for your nurse. Please bring in items such as: books, magazines, newspapers, electronics, or any other items  to occupy your time in the waiting area. [x]  Delays may occur with surgery and staff will make a sincere effort to keep you informed of delays. If any delays occur with your procedure, we apologize ahead of time for your inconvenience as we recognize the value of your time.

## 2020-02-25 NOTE — TELEPHONE ENCOUNTER
Last Appointment:  2/13/2020  Future Appointments   Date Time Provider Lionel Harryi   2/27/2020  9:00 AM MD Amrita Rodriguez [facility]   3/27/2020  1:40 PM MD Amrita Rodriguez [facility]

## 2020-02-27 ENCOUNTER — OFFICE VISIT (OUTPATIENT)
Dept: FAMILY MEDICINE CLINIC | Age: 70
End: 2020-02-27
Payer: MEDICARE

## 2020-02-27 ENCOUNTER — TELEPHONE (OUTPATIENT)
Dept: ONCOLOGY | Age: 70
End: 2020-02-27

## 2020-02-27 VITALS
HEART RATE: 110 BPM | DIASTOLIC BLOOD PRESSURE: 72 MMHG | OXYGEN SATURATION: 95 % | SYSTOLIC BLOOD PRESSURE: 153 MMHG | WEIGHT: 208 LBS | TEMPERATURE: 97.5 F | BODY MASS INDEX: 28.17 KG/M2 | HEIGHT: 72 IN | RESPIRATION RATE: 18 BRPM

## 2020-02-27 PROCEDURE — G8482 FLU IMMUNIZE ORDER/ADMIN: HCPCS | Performed by: STUDENT IN AN ORGANIZED HEALTH CARE EDUCATION/TRAINING PROGRAM

## 2020-02-27 PROCEDURE — 1123F ACP DISCUSS/DSCN MKR DOCD: CPT | Performed by: STUDENT IN AN ORGANIZED HEALTH CARE EDUCATION/TRAINING PROGRAM

## 2020-02-27 PROCEDURE — G8417 CALC BMI ABV UP PARAM F/U: HCPCS | Performed by: STUDENT IN AN ORGANIZED HEALTH CARE EDUCATION/TRAINING PROGRAM

## 2020-02-27 PROCEDURE — 99212 OFFICE O/P EST SF 10 MIN: CPT | Performed by: STUDENT IN AN ORGANIZED HEALTH CARE EDUCATION/TRAINING PROGRAM

## 2020-02-27 PROCEDURE — 3017F COLORECTAL CA SCREEN DOC REV: CPT | Performed by: STUDENT IN AN ORGANIZED HEALTH CARE EDUCATION/TRAINING PROGRAM

## 2020-02-27 PROCEDURE — 4040F PNEUMOC VAC/ADMIN/RCVD: CPT | Performed by: STUDENT IN AN ORGANIZED HEALTH CARE EDUCATION/TRAINING PROGRAM

## 2020-02-27 PROCEDURE — G8427 DOCREV CUR MEDS BY ELIG CLIN: HCPCS | Performed by: STUDENT IN AN ORGANIZED HEALTH CARE EDUCATION/TRAINING PROGRAM

## 2020-02-27 PROCEDURE — 1036F TOBACCO NON-USER: CPT | Performed by: STUDENT IN AN ORGANIZED HEALTH CARE EDUCATION/TRAINING PROGRAM

## 2020-02-27 PROCEDURE — 1111F DSCHRG MED/CURRENT MED MERGE: CPT | Performed by: STUDENT IN AN ORGANIZED HEALTH CARE EDUCATION/TRAINING PROGRAM

## 2020-02-27 PROCEDURE — 99213 OFFICE O/P EST LOW 20 MIN: CPT | Performed by: STUDENT IN AN ORGANIZED HEALTH CARE EDUCATION/TRAINING PROGRAM

## 2020-02-27 NOTE — PROGRESS NOTES
Authorizing Provider   tiotropium (SPIRIVA HANDIHALER) 18 MCG inhalation capsule Inhale 18 mcg into the lungs daily Yes Historical Provider, MD   albuterol sulfate HFA (PROVENTIL HFA) 108 (90 Base) MCG/ACT inhaler Inhale 2 puffs into the lungs 4 times daily Yes Leonie Saha MD   benzonatate (TESSALON) 200 MG capsule Take 1 capsule by mouth 3 times daily as needed for Cough Yes Guillaume Pappas MD   nicotine (NICODERM CQ) 14 MG/24HR Place 1 patch onto the skin daily Yes Guillaume Pappas MD   tiotropium ([place] Johnathan) 18 MCG inhalation capsule Inhale 1 capsule into the lungs daily Yes Guillaume Pappas MD   ipratropium-albuterol (DUONEB) 0.5-2.5 (3) MG/3ML SOLN nebulizer solution Inhale 3 mLs into the lungs every 4 hours as needed for Shortness of Breath Yes Guillaume Pappas MD   cloNIDine (CATAPRES) 0.2 MG tablet take 1 tablet by mouth twice a day Yes Leonie Saha MD   amLODIPine (NORVASC) 10 MG tablet take 1 tablet by mouth every morning BEFORE BREAKFAST Yes Leonie Saha MD   allopurinol (ZYLOPRIM) 100 MG tablet take 1 tablet by mouth twice a day Yes Leonie Saha MD   hydrochlorothiazide (HYDRODIURIL) 12.5 MG tablet take 1 tablet by mouth once daily Yes Leonie Saha MD   oxyCODONE-acetaminophen (PERCOCET) 7.5-325 MG per tablet take 1 tablet by mouth every 8 hours AS NEEDED FOR PAIN Yes Historical Provider, MD   atorvastatin (LIPITOR) 10 MG tablet take 1 tablet by mouth at bedtime Yes Leonie Saha MD   losartan (COZAAR) 100 MG tablet take 1 tablet by mouth every morning Yes Leonie Saha MD   pantoprazole (PROTONIX) 40 MG tablet Take 1 tablet by mouth daily Yes Leonie Saha MD   metoprolol succinate (TOPROL XL) 50 MG extended release tablet Take 1 tablet by mouth every morning Yes Leonie Saha MD   NARCAN 4 MG/0.1ML LIQD nasal spray instill 1 spray in 1 NOSTRIL if needed for opioid overdose may re. ..  (REFER TO PRESCRIPTION NOTES).  Yes Historical

## 2020-02-28 ENCOUNTER — HOSPITAL ENCOUNTER (OUTPATIENT)
Age: 70
Setting detail: OUTPATIENT SURGERY
Discharge: HOME | End: 2020-02-28
Attending: INTERNAL MEDICINE | Admitting: INTERNAL MEDICINE
Payer: MEDICARE

## 2020-02-28 ENCOUNTER — APPOINTMENT (OUTPATIENT)
Dept: GENERAL RADIOLOGY | Age: 70
End: 2020-02-28
Attending: INTERNAL MEDICINE
Payer: MEDICARE

## 2020-02-28 ENCOUNTER — ANESTHESIA EVENT (OUTPATIENT)
Dept: ENDOSCOPY | Age: 70
End: 2020-02-28
Payer: MEDICARE

## 2020-02-28 ENCOUNTER — TELEPHONE (OUTPATIENT)
Dept: ONCOLOGY | Age: 70
End: 2020-02-28

## 2020-02-28 ENCOUNTER — ANESTHESIA (OUTPATIENT)
Dept: ENDOSCOPY | Age: 70
End: 2020-02-28
Payer: MEDICARE

## 2020-02-28 VITALS
OXYGEN SATURATION: 95 % | SYSTOLIC BLOOD PRESSURE: 145 MMHG | HEIGHT: 72 IN | DIASTOLIC BLOOD PRESSURE: 89 MMHG | TEMPERATURE: 98.2 F | RESPIRATION RATE: 20 BRPM | WEIGHT: 208 LBS | BODY MASS INDEX: 28.17 KG/M2 | HEART RATE: 92 BPM

## 2020-02-28 VITALS
SYSTOLIC BLOOD PRESSURE: 146 MMHG | DIASTOLIC BLOOD PRESSURE: 106 MMHG | RESPIRATION RATE: 1 BRPM | TEMPERATURE: 97.2 F | OXYGEN SATURATION: 95 %

## 2020-02-28 PROCEDURE — 7100000000 HC PACU RECOVERY - FIRST 15 MIN: Performed by: INTERNAL MEDICINE

## 2020-02-28 PROCEDURE — 6360000002 HC RX W HCPCS: Performed by: ANESTHESIOLOGY

## 2020-02-28 PROCEDURE — 94640 AIRWAY INHALATION TREATMENT: CPT

## 2020-02-28 PROCEDURE — 94664 DEMO&/EVAL PT USE INHALER: CPT

## 2020-02-28 PROCEDURE — 6370000000 HC RX 637 (ALT 250 FOR IP): Performed by: ANESTHESIOLOGY

## 2020-02-28 PROCEDURE — C1725 CATH, TRANSLUMIN NON-LASER: HCPCS | Performed by: INTERNAL MEDICINE

## 2020-02-28 PROCEDURE — 3700000000 HC ANESTHESIA ATTENDED CARE: Performed by: INTERNAL MEDICINE

## 2020-02-28 PROCEDURE — 7100000010 HC PHASE II RECOVERY - FIRST 15 MIN: Performed by: INTERNAL MEDICINE

## 2020-02-28 PROCEDURE — 2580000003 HC RX 258: Performed by: ANESTHESIOLOGY

## 2020-02-28 PROCEDURE — 7100000001 HC PACU RECOVERY - ADDTL 15 MIN: Performed by: INTERNAL MEDICINE

## 2020-02-28 PROCEDURE — 88173 CYTOPATH EVAL FNA REPORT: CPT

## 2020-02-28 PROCEDURE — 3700000001 HC ADD 15 MINUTES (ANESTHESIA): Performed by: INTERNAL MEDICINE

## 2020-02-28 PROCEDURE — 3609011200 HC BRONCHOSCOPY W/TRANSBRONCL NDL ASPIR BX EA ADDL LOBE: Performed by: INTERNAL MEDICINE

## 2020-02-28 PROCEDURE — 2500000003 HC RX 250 WO HCPCS

## 2020-02-28 PROCEDURE — 2720000010 HC SURG SUPPLY STERILE: Performed by: INTERNAL MEDICINE

## 2020-02-28 PROCEDURE — 3603165200 HC BRNCHSC EBUS GUIDED SAMPL 1/2 NODE STATION/STRUX: Performed by: INTERNAL MEDICINE

## 2020-02-28 PROCEDURE — 6360000002 HC RX W HCPCS

## 2020-02-28 PROCEDURE — 3609011900 HC BRONCHOSCOPY NEEDLE BX TRACHEA MAIN STEM&/BRON: Performed by: INTERNAL MEDICINE

## 2020-02-28 PROCEDURE — 7100000011 HC PHASE II RECOVERY - ADDTL 15 MIN: Performed by: INTERNAL MEDICINE

## 2020-02-28 PROCEDURE — 88305 TISSUE EXAM BY PATHOLOGIST: CPT

## 2020-02-28 PROCEDURE — 31652 BRONCH EBUS SAMPLNG 1/2 NODE: CPT | Performed by: INTERNAL MEDICINE

## 2020-02-28 PROCEDURE — 2709999900 HC NON-CHARGEABLE SUPPLY: Performed by: INTERNAL MEDICINE

## 2020-02-28 PROCEDURE — 71045 X-RAY EXAM CHEST 1 VIEW: CPT

## 2020-02-28 RX ORDER — OXYCODONE HYDROCHLORIDE AND ACETAMINOPHEN 5; 325 MG/1; MG/1
1 TABLET ORAL
Status: DISCONTINUED | OUTPATIENT
Start: 2020-02-28 | End: 2020-02-28 | Stop reason: HOSPADM

## 2020-02-28 RX ORDER — DIPHENHYDRAMINE HYDROCHLORIDE 50 MG/ML
12.5 INJECTION INTRAMUSCULAR; INTRAVENOUS
Status: DISCONTINUED | OUTPATIENT
Start: 2020-02-28 | End: 2020-02-28 | Stop reason: HOSPADM

## 2020-02-28 RX ORDER — SODIUM CHLORIDE 9 MG/ML
INJECTION, SOLUTION INTRAVENOUS CONTINUOUS
Status: DISCONTINUED | OUTPATIENT
Start: 2020-02-28 | End: 2020-02-28 | Stop reason: HOSPADM

## 2020-02-28 RX ORDER — NEOSTIGMINE METHYLSULFATE 1 MG/ML
INJECTION, SOLUTION INTRAVENOUS PRN
Status: DISCONTINUED | OUTPATIENT
Start: 2020-02-28 | End: 2020-02-28 | Stop reason: SDUPTHER

## 2020-02-28 RX ORDER — FENTANYL CITRATE 50 UG/ML
INJECTION, SOLUTION INTRAMUSCULAR; INTRAVENOUS PRN
Status: DISCONTINUED | OUTPATIENT
Start: 2020-02-28 | End: 2020-02-28 | Stop reason: SDUPTHER

## 2020-02-28 RX ORDER — FENTANYL CITRATE 50 UG/ML
50 INJECTION, SOLUTION INTRAMUSCULAR; INTRAVENOUS EVERY 5 MIN PRN
Status: DISCONTINUED | OUTPATIENT
Start: 2020-02-28 | End: 2020-02-28 | Stop reason: HOSPADM

## 2020-02-28 RX ORDER — LIDOCAINE HYDROCHLORIDE 20 MG/ML
INJECTION, SOLUTION INTRAVENOUS PRN
Status: DISCONTINUED | OUTPATIENT
Start: 2020-02-28 | End: 2020-02-28 | Stop reason: SDUPTHER

## 2020-02-28 RX ORDER — PROPOFOL 10 MG/ML
INJECTION, EMULSION INTRAVENOUS PRN
Status: DISCONTINUED | OUTPATIENT
Start: 2020-02-28 | End: 2020-02-28 | Stop reason: SDUPTHER

## 2020-02-28 RX ORDER — PROMETHAZINE HYDROCHLORIDE 25 MG/ML
6.25 INJECTION, SOLUTION INTRAMUSCULAR; INTRAVENOUS
Status: DISCONTINUED | OUTPATIENT
Start: 2020-02-28 | End: 2020-02-28 | Stop reason: HOSPADM

## 2020-02-28 RX ORDER — ALBUTEROL SULFATE 2.5 MG/3ML
2.5 SOLUTION RESPIRATORY (INHALATION)
Status: COMPLETED | OUTPATIENT
Start: 2020-02-28 | End: 2020-02-28

## 2020-02-28 RX ORDER — GLYCOPYRROLATE 1 MG/5 ML
SYRINGE (ML) INTRAVENOUS PRN
Status: DISCONTINUED | OUTPATIENT
Start: 2020-02-28 | End: 2020-02-28 | Stop reason: SDUPTHER

## 2020-02-28 RX ORDER — ONDANSETRON 2 MG/ML
INJECTION INTRAMUSCULAR; INTRAVENOUS PRN
Status: DISCONTINUED | OUTPATIENT
Start: 2020-02-28 | End: 2020-02-28 | Stop reason: SDUPTHER

## 2020-02-28 RX ORDER — DEXAMETHASONE SODIUM PHOSPHATE 10 MG/ML
INJECTION INTRAMUSCULAR; INTRAVENOUS PRN
Status: DISCONTINUED | OUTPATIENT
Start: 2020-02-28 | End: 2020-02-28 | Stop reason: SDUPTHER

## 2020-02-28 RX ORDER — IPRATROPIUM BROMIDE AND ALBUTEROL SULFATE 2.5; .5 MG/3ML; MG/3ML
1 SOLUTION RESPIRATORY (INHALATION) ONCE
Status: COMPLETED | OUTPATIENT
Start: 2020-02-28 | End: 2020-02-28

## 2020-02-28 RX ORDER — MEPERIDINE HYDROCHLORIDE 50 MG/ML
12.5 INJECTION INTRAMUSCULAR; INTRAVENOUS; SUBCUTANEOUS EVERY 5 MIN PRN
Status: DISCONTINUED | OUTPATIENT
Start: 2020-02-28 | End: 2020-02-28 | Stop reason: HOSPADM

## 2020-02-28 RX ORDER — ROCURONIUM BROMIDE 10 MG/ML
INJECTION, SOLUTION INTRAVENOUS PRN
Status: DISCONTINUED | OUTPATIENT
Start: 2020-02-28 | End: 2020-02-28 | Stop reason: SDUPTHER

## 2020-02-28 RX ORDER — FENTANYL CITRATE 50 UG/ML
25 INJECTION, SOLUTION INTRAMUSCULAR; INTRAVENOUS EVERY 5 MIN PRN
Status: DISCONTINUED | OUTPATIENT
Start: 2020-02-28 | End: 2020-02-28 | Stop reason: HOSPADM

## 2020-02-28 RX ADMIN — IPRATROPIUM BROMIDE AND ALBUTEROL SULFATE 1 AMPULE: 2.5; .5 SOLUTION RESPIRATORY (INHALATION) at 13:59

## 2020-02-28 RX ADMIN — PROPOFOL 200 MG: 10 INJECTION, EMULSION INTRAVENOUS at 14:40

## 2020-02-28 RX ADMIN — FENTANYL CITRATE 100 MCG: 50 INJECTION, SOLUTION INTRAMUSCULAR; INTRAVENOUS at 14:40

## 2020-02-28 RX ADMIN — ONDANSETRON HYDROCHLORIDE 4 MG: 2 INJECTION, SOLUTION INTRAMUSCULAR; INTRAVENOUS at 15:22

## 2020-02-28 RX ADMIN — LIDOCAINE HYDROCHLORIDE 100 MG: 20 INJECTION, SOLUTION INTRAVENOUS at 14:40

## 2020-02-28 RX ADMIN — PHENYLEPHRINE HYDROCHLORIDE 100 MCG: 10 INJECTION INTRAVENOUS at 14:59

## 2020-02-28 RX ADMIN — ALBUTEROL SULFATE 2.5 MG: 2.5 SOLUTION RESPIRATORY (INHALATION) at 16:27

## 2020-02-28 RX ADMIN — SODIUM CHLORIDE: 9 INJECTION, SOLUTION INTRAVENOUS at 13:41

## 2020-02-28 RX ADMIN — Medication 40 MG: at 14:40

## 2020-02-28 RX ADMIN — Medication 0.6 MG: at 15:22

## 2020-02-28 RX ADMIN — PHENYLEPHRINE HYDROCHLORIDE 100 MCG: 10 INJECTION INTRAVENOUS at 15:18

## 2020-02-28 RX ADMIN — Medication 3 MG: at 15:21

## 2020-02-28 RX ADMIN — DEXAMETHASONE SODIUM PHOSPHATE 10 MG: 10 INJECTION INTRAMUSCULAR; INTRAVENOUS at 14:40

## 2020-02-28 ASSESSMENT — PULMONARY FUNCTION TESTS
PIF_VALUE: 0
PIF_VALUE: 23
PIF_VALUE: 0
PIF_VALUE: 30
PIF_VALUE: 0
PIF_VALUE: 30
PIF_VALUE: 26
PIF_VALUE: 21
PIF_VALUE: 15
PIF_VALUE: 26
PIF_VALUE: 22
PIF_VALUE: 30
PIF_VALUE: 0
PIF_VALUE: 30
PIF_VALUE: 1
PIF_VALUE: 30
PIF_VALUE: 20
PIF_VALUE: 25
PIF_VALUE: 1
PIF_VALUE: 30
PIF_VALUE: 21
PIF_VALUE: 30
PIF_VALUE: 0
PIF_VALUE: 31
PIF_VALUE: 22
PIF_VALUE: 30
PIF_VALUE: 0
PIF_VALUE: 30
PIF_VALUE: 30
PIF_VALUE: 26
PIF_VALUE: 26
PIF_VALUE: 20
PIF_VALUE: 30
PIF_VALUE: 23
PIF_VALUE: 30
PIF_VALUE: 30
PIF_VALUE: 34
PIF_VALUE: 30
PIF_VALUE: 35
PIF_VALUE: 2
PIF_VALUE: 30
PIF_VALUE: 30
PIF_VALUE: 20
PIF_VALUE: 30
PIF_VALUE: 0
PIF_VALUE: 30
PIF_VALUE: 30
PIF_VALUE: 2
PIF_VALUE: 8
PIF_VALUE: 30
PIF_VALUE: 26
PIF_VALUE: 26
PIF_VALUE: 21
PIF_VALUE: 0
PIF_VALUE: 0
PIF_VALUE: 30

## 2020-02-28 ASSESSMENT — PAIN DESCRIPTION - FREQUENCY: FREQUENCY: CONTINUOUS

## 2020-02-28 ASSESSMENT — PAIN DESCRIPTION - ORIENTATION: ORIENTATION: RIGHT

## 2020-02-28 ASSESSMENT — PAIN SCALES - GENERAL
PAINLEVEL_OUTOF10: 0
PAINLEVEL_OUTOF10: 7

## 2020-02-28 ASSESSMENT — LIFESTYLE VARIABLES: SMOKING_STATUS: 1

## 2020-02-28 ASSESSMENT — PAIN DESCRIPTION - PROGRESSION: CLINICAL_PROGRESSION: NOT CHANGED

## 2020-02-28 ASSESSMENT — PAIN DESCRIPTION - DESCRIPTORS: DESCRIPTORS: DISCOMFORT;CONSTANT

## 2020-02-28 ASSESSMENT — PAIN DESCRIPTION - ONSET: ONSET: ON-GOING

## 2020-02-28 ASSESSMENT — PAIN DESCRIPTION - LOCATION: LOCATION: SHOULDER

## 2020-02-28 ASSESSMENT — PAIN - FUNCTIONAL ASSESSMENT: PAIN_FUNCTIONAL_ASSESSMENT: 0-10

## 2020-02-28 ASSESSMENT — PAIN DESCRIPTION - PAIN TYPE: TYPE: ACUTE PAIN

## 2020-02-28 NOTE — ANESTHESIA PRE PROCEDURE
Counseling given: Yes      Vital Signs (Current):   Vitals:    02/25/20 0937 02/28/20 1259 02/28/20 1300   BP:   (!) 129/99   Pulse:   92   Resp:   18   Temp:   98.1 °F (36.7 °C)   TempSrc:   Temporal   SpO2:   93%   Weight: 208 lb (94.3 kg) 208 lb (94.3 kg) 208 lb (94.3 kg)   Height: 6' (1.829 m) 6' (1.829 m) 6' (1.829 m)                                              BP Readings from Last 3 Encounters:   02/28/20 (!) 129/99   02/27/20 (!) 153/72   02/21/20 (!) 150/70       NPO Status:  greater than 8 hours                                                                               BMI:   Wt Readings from Last 3 Encounters:   02/28/20 208 lb (94.3 kg)   02/27/20 208 lb (94.3 kg)   02/21/20 204 lb (92.5 kg)     Body mass index is 28.21 kg/m². CBC:   Lab Results   Component Value Date    WBC 9.5 02/20/2020    RBC 4.18 02/20/2020    HGB 12.5 02/20/2020    HCT 38.1 02/20/2020    MCV 91.1 02/20/2020    RDW 14.5 02/20/2020     02/20/2020       CMP:   Lab Results   Component Value Date     02/20/2020    K 3.6 02/20/2020    K 3.6 05/25/2019     02/20/2020    CO2 24 02/20/2020    BUN 15 02/20/2020    CREATININE 1.1 02/20/2020    GFRAA >60 02/20/2020    LABGLOM >60 02/20/2020    GLUCOSE 111 02/20/2020    GLUCOSE 73 03/28/2012    PROT 6.0 02/20/2020    CALCIUM 8.8 02/20/2020    BILITOT 0.3 02/20/2020    ALKPHOS 90 02/20/2020    AST 10 02/20/2020    ALT 5 02/20/2020       POC Tests: No results for input(s): POCGLU, POCNA, POCK, POCCL, POCBUN, POCHEMO, POCHCT in the last 72 hours.     Coags:   Lab Results   Component Value Date    PROTIME 13.5 11/26/2012    INR 1.3 11/26/2012       HCG (If Applicable): No results found for: PREGTESTUR, PREGSERUM, HCG, HCGQUANT     ABGs: No results found for: PHART, PO2ART, ITX5VIP, PDB0NXE, BEART, T9NZUDCO     Type & Screen (If Applicable):  No results found for: LABABO, [address]    Anesthesia Evaluation  Patient summary reviewed no history of anesthetic complications:

## 2020-02-28 NOTE — PROGRESS NOTES
Patient sitting up in bed taking PO. No family present. Calling daughter for a ride. Belongings returned.

## 2020-02-28 NOTE — OP NOTE
[address]  BROCHOSCOPY/ EBUS  PROCEDURE NOTE    DATE OF PROCEDURE: 2/28 / 2020    INDICATIONS & HISTORY:  Right hilar lung mass    PREOPERATIVE DIAGNOSIS:    Right upper lobe mass    POSTOPERATIVE DIAGNOSES:  Non-small cell lung cancer      PROCEDURE PERFORMED:   1-Diagnotic, Fiberoptic Bronchoscopy  2-EBUS -FNA station L4 ,station 7               SURGEON:    Dana Phillips     ASSISTANT:   Bronchoscopy Nursing/Technical Team/OR Team    SEDATION:  propofol   Regional Anest[facility],       ANESTHESIA:    Lidocaine 2% ,       ANESTHESIOLOGIST:  Per EPIC Note      Description of Procedure: The patient was taken to the endoscopy suite, Ngozi Burris  and the procedure verified as Flexible Fiberoptic Bronchoscopy with EBUS-FNA station L4, station 7        After the patient was controlled with sedation bronchoscope was introduced through ET without difficulty. The scope was then passed into the trachea. Additional 2% lidocaine was used topically within the airway. Careful inspection of the tracheal lumen was accomplished. The scope was sequentially passed into all bronchial airways.            Endobronchial findings:     Trachea:  Normal mucosa, he the part seen outside the ET tube  Mila  Normal mucosa     Right Main Stem Bronchus  Normal mucosa  Right Upper Lobe Bronchi  was torturous mucosa erythematous fungating lesion that is very suggestive of malignancy without complete obstruction of the right upper lobe but with some narrowing of the right upper lobe main bronchus extrinsic compression on the right upper lobe bronchus    Right Middle Lobe Bronchi  Normal mucosa  Right Lower Lobe Bronchi (including the Superior segment)  Normal mucosa     Left Main Stem Bronchus Normal mucosa  Left Upper Lobe Bronchus, Upper Division Normal mucosa  Left Upper Lobe Bronchus, Lingula  Normal mucosa  Left Lower Lobe Bronchus (including the Superior segment)             Bronchoscope was inserted, will start with in 3 disease, we biopsy L4 as per the image, 3 passes was taking preliminary seems positive for non-small cell cancer but not confirmed so we moved to station 7, more than 6 passes was obtained, it was 7 mm, so cell block and immunological testing and markers were sent    COMPLICATIONS:  Estimated blood loss less than 3 CC    IMPRESSIONS:   Non-small cell lung cancer, with extensive compression and erythematous irregular mucosa right upper lobe suggestive of malignancy as well      RECOMMENDATIONS:   The Patient was taken to the recovery area in satisfactory condition. Await final test/sample results.         Subhash Harrell MD

## 2020-02-28 NOTE — H&P
Pulmonary [facility]                             Pulmonary Consult/Progress Note :          Patient: Burton Donahue. MRN: [id number]  : 1950    No changes in H&P   Pleural fluid inconclusive     Will proceed with ebus and bronch ,possible debulking    Risk benefits discussed      Date of Admission: .2020 12:56 PM    Consulting Physician:Dr Eber Trejo         Reason for Consultation: Lung mass, pleural effusion  CC :  Worsening shortness of breath and cough  HPI:   Burton Khan is a 81y.o. year old with history of coronary artery disease status post stent, hypertension, tobacco abuse, he presented to the hospital with worsening cough and shortness of breath that has been going on for the last weeks    Patient states that he has been having coughing and feels dizzy, with no hemoptysis, he has also some abdominal pain,    He is known history of tonsillar cancer and he follows with ENT,    The patient has been feeling better since he was started on IV steroids here, had a CAT scan of the chest that shows right side hilar mass along with pleural effusion    Oncology was consulted      PAST MEDICAL HISTORY:     Past Medical History:   Diagnosis Date    CAD (coronary artery disease)     DDD (degenerative disc disease) 2011    Hiatal hernia with gastroesophageal reflux disease and esophagitis     Hypertension     Inguinal hernia     left  S/P repair 17    Intervertebral disk disease     MI (myocardial infarction) ([address]     Pneumonia 2020    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    radiation to area    Psychiatric problem     S/P cardiac cath 2020       PAST SURGICAL HISTORY:   Past Surgical History:   Procedure Laterality Date    ABDOMEN SURGERY      herni repair as child    CARDIOVASCULAR STRESS TEST  2012    abnormal stress test    COLONOSCOPY      CORONARY ANGIOPLASTY      stents x2 by Dr Vinh Jaimes per session: Not on file    Stress: Not on file   Relationships    Social connections:     Talks on phone: Not on file     Gets together: Not on file     Attends [employer] service: Not on file     Active member of club or organization: Not on file     Attends meetings of clubs or organizations: Not on file     Relationship status: Not on file    Intimate partner violence:     Fear of current or ex partner: Not on file     Emotionally abused: Not on file     Physically abused: Not on file     Forced sexual activity: Not on file   Other Topics Concern    Not on file   Social History Narrative    Drinks 1-2 cups of coffee daily. Social History     Tobacco Use   Smoking Status Former Smoker    Packs/day: 0.50    Years: 31.00    Pack years: 15.50    Types: Cigarettes    Last attempt to quit: 2020    Years since quittin.0   Smokeless Tobacco Never Used     Social History     Substance and Sexual Activity   Alcohol Use No     Social History     Substance and Sexual Activity   Drug Use No    Comment: past marijuana  use;  last used                HOME MEDICATIONS:  Prior to Admission medications    Medication Sig Start Date End Date Taking?  Authorizing Provider   tiotropium (SPIRIVA HANDIHALER) 18 MCG inhalation capsule Inhale 18 mcg into the lungs daily   Yes Historical Provider, MD   albuterol sulfate HFA (PROVENTIL HFA) 108 (90 Base) MCG/ACT inhaler Inhale 2 puffs into the lungs 4 times daily 20  Yes Camilla Pride MD   amoxicillin-clavulanate (AUGMENTIN) 250-62.5 MG/5ML suspension Take 5 mLs by mouth 2 times daily for 7 days 20 Yes Yohannes García MD   benzonatate (TESSALON) 200 MG capsule Take 1 capsule by mouth 3 times daily as needed for Cough 2/21/20 3/22/20 Yes Yohannes García MD   tiotropium (SPIRIVA HANDIHALER) 18 MCG inhalation capsule Inhale 1 capsule into the lungs daily 20  Yes Yohannes García MD   ipratropium-albuterol (DUONEB) 0.5-2.5 (3) (DILAUDID) injection 0.5 mg, 0.5 mg, Intravenous, Q5 Min PRN  oxyCODONE-acetaminophen (PERCOCET) 5-325 MG per tablet 1 tablet, 1 tablet, Oral, Once PRN  diphenhydrAMINE (BENADRYL) injection 12.5 mg, 12.5 mg, Intravenous, Once PRN  promethazine (PHENERGAN) injection 6.25 mg, 6.25 mg, Intravenous, Q15 Min PRN  meperidine (DEMEROL) injection 12.5 mg, 12.5 mg, Intravenous, Q5 Min PRN    IV MEDICATIONS:   sodium chloride 75 mL/hr at 02/28/20 1341       ALLERGIES:  No Known Allergies    REVIEW OF SYSTEMS:  General ROS:  No weight loss ,no fatigue     ENT ROS:   No Sore throat ,no lymphoadenopathy,no nasal stuffiness     Hematological and Lymphatic ROS:   No ecchymosis ,no tendency to bleed  Respiratory ROS:   Shortness of breath, cough  Cardiovascular ROS:   No CP,No Palpitation   Gastrointestinal ROS:   No Gi bleed,no nausea or vomiting, does have some abdominal pain     - Musculoskeletal ROS:      - no joint swelling ,no joint pain   Neurological ROS:     -no weakness or numbness    Dermatological ROS:   No skin rash ,no urticaria     PHYSICAL EXAMINATION:     VITAL SIGNS:  BP (!) 129/99   Pulse 92   Temp 98.1 °F (36.7 °C) (Temporal)   Resp 18   Ht 6' (1.829 m)   Wt 208 lb (94.3 kg)   SpO2 93%   BMI 28.21 kg/m²   Wt Readings from Last 3 Encounters:   02/28/20 208 lb (94.3 kg)   02/27/20 208 lb (94.3 kg)   02/21/20 204 lb (92.5 kg)     Temp Readings from Last 3 Encounters:   02/28/20 98.1 °F (36.7 °C) (Temporal)   02/27/20 97.5 °F (36.4 °C) (Oral)   02/21/20 97.6 °F (36.4 °C) (Oral)     TMAX:  BP Readings from Last 3 Encounters:   02/28/20 (!) 129/99   02/27/20 (!) 153/72   02/21/20 (!) 150/70     Pulse Readings from Last 3 Encounters:   02/28/20 92   02/27/20 110   02/21/20 93           INTAKE/OUTPUTS:  No intake/output data recorded.   No intake or output data in the 24 hours ending 02/28/20 1428    General Appearance: alert and oriented to person, place and time, well-developed and   well-nourished, in no acute

## 2020-02-29 ASSESSMENT — ENCOUNTER SYMPTOMS
VOMITING: 0
EYE ITCHING: 0
EYE PAIN: 0
COUGH: 0
ABDOMINAL PAIN: 0
RHINORRHEA: 0
ABDOMINAL DISTENTION: 0
SHORTNESS OF BREATH: 0
BACK PAIN: 0
CONSTIPATION: 0
NAUSEA: 0
BLOOD IN STOOL: 0
WHEEZING: 0
DIARRHEA: 0
COLOR CHANGE: 0
CHEST TIGHTNESS: 0

## 2020-03-04 ENCOUNTER — HOSPITAL ENCOUNTER (OUTPATIENT)
Dept: RADIATION ONCOLOGY | Age: 70
Discharge: HOME | End: 2020-03-04
Payer: MEDICARE

## 2020-03-04 ENCOUNTER — APPOINTMENT (OUTPATIENT)
Dept: RADIATION ONCOLOGY | Age: 70
End: 2020-03-04
Attending: RADIOLOGY
Payer: MEDICARE

## 2020-03-04 VITALS
RESPIRATION RATE: 18 BRPM | DIASTOLIC BLOOD PRESSURE: 92 MMHG | TEMPERATURE: 97.4 F | BODY MASS INDEX: 27.8 KG/M2 | HEART RATE: 98 BPM | SYSTOLIC BLOOD PRESSURE: 128 MMHG | OXYGEN SATURATION: 95 % | WEIGHT: 205 LBS

## 2020-03-04 PROCEDURE — 77334 RADIATION TREATMENT AID(S): CPT | Performed by: RADIOLOGY

## 2020-03-04 PROCEDURE — 77290 THER RAD SIMULAJ FIELD CPLX: CPT | Performed by: RADIOLOGY

## 2020-03-04 PROCEDURE — 99205 OFFICE O/P NEW HI 60 MIN: CPT

## 2020-03-04 PROCEDURE — 99205 OFFICE O/P NEW HI 60 MIN: CPT | Performed by: RADIOLOGY

## 2020-03-04 RX ORDER — MEMANTINE HYDROCHLORIDE 10 MG/1
10 TABLET ORAL 2 TIMES DAILY
Qty: 60 TABLET | Refills: 5 | Status: SHIPPED | OUTPATIENT
Start: 2020-03-04

## 2020-03-04 NOTE — PROGRESS NOTES
Radiation Oncology      Lara Dumont. Dixon Sun  [address] Vivek Mackay   [phone]               Referring Physician: Dr. Miguel Huddleston. Kj Lara MD   Primary Oncologist: Dr. Miguel Huddleston. RomanChristopher      Diagnosis: AJCC SG IV lung cancer      Service:  Radiation Oncology consultation performed on 3/4/20        HPI:      Patty Ross is a pleasant 81year old with locally advanced lung cancer with new CNS Sx which prompted MRI brain (also to complete staging). 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 The patient presents today to discuss fractionated external beam radiation therapy as a component of multidisciplinary, palliative management. We reviewed the available medical records including the complete medical history of this pt today prior to consultation. Epic -CE and available scanned documents per the Epic Media tab were reviewed PRN. A complete ROS was also performed today and is noted below. During consultation today I personally discussed the pts workup to date; including but not limited to applicable imaging studies, Pathology reports, and interventions. The NCCN guidelines, as pertaining to the above diagnosis were also recapped for the pt today in brief. Today, Jazzmine Early.  notes Sx that include HA, dizziness and SOB. KPS 70.        -----      Per [address]:    Impression/Plan:  81 y.o. male with a past medical hx of MI, HTN, DDD, CAD, and tonsillar squamous cell carcinoma.  He was treated for his tonsillar carcinoma by Dr. Sandra Swain at the [facility] in 2009 with Cisplatin and 5-Fu and radiation.      Hx smoking 0.52 packs of cigarettes per day for 31 years.      Admitted to the ED on 02/19/2020 with lung mass after complaints of cough, dizziness, and SOB beginning 2 months ago.      CTA chest 02/19/2020:  Large infiltrative mass in the right hilum extending to the right apex  concerning for malignancy with narrowing of the right upper lobe  bronchus and segmental branches of right upper lobe pulmonary artery which are occluded and invaded by tumor. No other central pulmonary embolism is identified. Multiple bilateral pulmonary nodules are identified which may represent metastasis. Further assessment by PET/CT scan and/or bronchoscopy is recommended.   Large pleural effusion and atelectasis in the right lower lobe.     CT abdomen/pelvis 02/19/2020:  Large pleural effusion in right lung base with atelectasis in the  right lower lobe and minimal pleural effusion in left base. Pneumonia is considered. Partially identified are multiple pulmonary nodules with  lymph nodes  in the cardiophrenic angle and upper abdomen concerning for  malignancy. Consider PET CT scan. Cystic lesions in liver and kidneys. There is no acute inflammation or bowel obstruction. There is thickening of the GE junction.      -Pulmonology team on board-Bronch/EBUS , he is s/p right thoracentesis, plan for bronch/EBUS.   - Right thoracentesis on 2/20/2020, 100 mL of straw-colored/clear pleural fluid removed, await cytology results.  -Will wait for pathology following procedures  -PET/CT as OP  - MRI brain w/wo contrast to complete staging  -Will continue to follow     Thank you for allowing us to participate in the care of Cr Clemons.        -----      Past Medical History:   Diagnosis Date    CAD (coronary artery disease)     DDD (degenerative disc disease) 2/1/2011    Hiatal hernia with gastroesophageal reflux disease and esophagitis     Hypertension     Inguinal hernia     left  S/P repair 11/29/17    Intervertebral disk disease     [facility]     Pneumonia 2/21/2020    Primary tonsillar squamous cell carcinoma ([address] 7/8/2013    radiation to area    Psychiatric problem     S/P cardiac cath 2/21/2020       Past Surgical needed for Cough 90 capsule 0    nicotine (NICODERM CQ) 14 MG/24HR Place 1 patch onto the skin daily 30 patch 3    tiotropium (SPIRIVA HANDIHALER) 18 MCG inhalation capsule Inhale 1 capsule into the lungs daily 30 capsule 0    ipratropium-albuterol (DUONEB) 0.5-2.5 (3) MG/3ML SOLN nebulizer solution Inhale 3 mLs into the lungs every 4 hours as needed for Shortness of Breath 360 mL 5    cloNIDine (CATAPRES) 0.2 MG tablet take 1 tablet by mouth twice a day 180 tablet 0    amLODIPine (NORVASC) 10 MG tablet take 1 tablet by mouth every morning BEFORE BREAKFAST 90 tablet 0    allopurinol (ZYLOPRIM) 100 MG tablet take 1 tablet by mouth twice a day 180 tablet 0    hydrochlorothiazide (HYDRODIURIL) 12.5 MG tablet take 1 tablet by mouth once daily 90 tablet 0    oxyCODONE-acetaminophen (PERCOCET) 7.5-325 MG per tablet take 1 tablet by mouth every 8 hours AS NEEDED FOR PAIN  0    atorvastatin (LIPITOR) 10 MG tablet take 1 tablet by mouth at bedtime 90 tablet 3    losartan (COZAAR) 100 MG tablet take 1 tablet by mouth every morning 90 tablet 1    pantoprazole (PROTONIX) 40 MG tablet Take 1 tablet by mouth daily 90 tablet 3    metoprolol succinate (TOPROL XL) 50 MG extended release tablet Take 1 tablet by mouth every morning 90 tablet 5    NARCAN 4 MG/0.1ML LIQD nasal spray instill 1 spray in 1 NOSTRIL if needed for opioid overdose may re. ..  (REFER TO PRESCRIPTION NOTES). 0    aspirin 81 MG EC tablet Take 1 tablet by mouth daily 90 tablet 3     No current facility-administered medications for this encounter.         No Known Allergies      Social History     Socioeconomic History    Marital status: Single     Spouse name: None    Number of children: None    Years of education: None    Highest education level: None   Occupational History    None   Social Needs    Financial resource strain: None    Food insecurity:     Worry: None     Inability: None    Transportation needs:     Medical: None Nose normal.      Mouth/Throat:      Mouth: Mucous membranes are moist.   Eyes:      Pupils: Pupils are equal, round, and reactive to light. Cardiovascular:      Rate and Rhythm: Normal rate and regular rhythm. Pulses: Normal pulses. Heart sounds: Normal heart sounds. Pulmonary:      Effort: Pulmonary effort is normal.      Breath sounds: Normal breath sounds. Abdominal:      General: Abdomen is flat. There is no distension. Musculoskeletal: Normal range of motion. General: No swelling. Skin:     General: Skin is warm. Capillary Refill: Capillary refill takes less than 2 seconds. Neurological:      General: No focal deficit present. Mental Status: He is alert. Cranial Nerves: No cranial nerve deficit. Motor: No weakness. Gait: Gait normal.   Psychiatric:         Mood and Affect: Mood normal.         Thought Content: Thought content normal.         Judgment: Judgment normal.           Imaging reviewed:        MRI 2/21/20:  Impression   Leptomeningeal enhancement with possible involvement of the underlying   cortex involving the left frontal gyrus. Considering history of lung   mass, this is suspicious for metastatic disease. Other infectious and   inflammatory etiologies are also considered with primary malignancy   felt less likely.  Consider CSF evaluation.                       Radiation Safety and Treatment Support:  -previous Radiation history: yes - HN cancer est 2005 (Dr. Suzanne Matos)  -history of connective tissue disease: No  -history of autoimmune disease: No  -pregnant: no  -fertility conservation and /or contraception discussed: no  -nutrition consult prior to [address]: Yes  -PEG: No  -Dental evaluation prior to treatment:No  -Social Work requested: Yes  -Oncology Nurse Navigator requested: Yes  -pre + post treatment PT / Rehab / PM+R evaluation considered: Yes  -ICD: No   -ICD brand: -  -ACS patient navigator: Susie Velez  -Nurse Practitioners for Radiation Oncology:    ---Mendy Brush, MSN, RN, FNP-C   ---Osbaldo Colin, DREW, RN, FNP-BC        Assessment and Plan: Cristhian is a pleasant and cooperative 89/19 year old with a recent diagnosis of AJCC stage group IV lung cancer. We recommend fractionated external beam radiation therapy to the whole brain to reduce the risk of neurologic death and prolong functional capacity Zenaida Means et al. Cortes Wang. 2007 Jul;12(7):884-98). Typical fractionation schemes are 30 Gy in 10 fractions and 37.50 Gy in 15 fractions (RTOG 6901 /// NCCN 1.2016 BRAIN-C 2/3). Hippocampal sparing techniques (IMRT) may be considered in specific clinical situations, as well as concurrent memantine (RTOG 0614) and SRS boost Angel UMANA et al. Int J Radiat Oncol Biol Phys. 1999 Sep 1;45(2):427-34). The risks, benefits, alternatives, process and logistics of external beam radiation to the whole brain were reviewed (risks include but are not limited to: herniation risk, seizure, nausea, malaise and HA). We answered all of the patient's questions to the best of our ability. The patient verbalized understanding and seemed satisfied. Radiation planning will commence within 1 hour; the next step in management being the simulation scan, with external beam radiation to commence in a timely fashion thereafter. *Tx plan is completed by previous HN treatment and elevated risks for treatment discussed 3/4/20. It was a pleasure meeting Cristhian today and we appreciate the referral and opportunity to be involved in his care. We had an extensive discussion today regarding the course to date (including a focused review of theapplicable radiographic and laboratory information), multidisciplinary approach to cancer care, and indications for external beam radiation therapy as a component therein.  A literature review and multidisciplinary discussion was performed after seeing this patient due to the complexity of the medical decision making in this case. I personally spent greater than 70 minutes with this patient and performed the complete history and physical as above at today's visit, at least 45 minutes was in direct discussion and  regarding disease management.          -memantine called into pharmacy, 6 mo course  -WBRT ASAP        Maikel Anderson. Matthew Mcdonnell MD [place] [address] Oncology  Cell: [phone]    [facility]:  [facility]: [phone]  [facility]:  [phone]   FAX:    [phone]  [address] [place] [address] Road:  [phone]   FAX:  [phone]        NOTE: This report was transcribed using voice recognition software. Every effort was made to ensure accuracy; however, inadvertent computerized transcription errors may be present.

## 2020-03-04 NOTE — PROGRESS NOTES
BRONCHOSCOPY N/A 2/28/2020    BRONCHOSCOPY  EBUS , APC CRYO performed by Alesha Manley MD at [address] [facility]  2/28/2020    BRONCHOSCOPY/TRANSBRONCHIAL NEEDLE BIOPSY performed by Alesha Manley MD at [address] [facility]  2/28/2020    BRONCHOSCOPY/TRANSBRONCHIAL NEEDLE BIOPSY ADDL LOBE performed by Alesha Manley MD at [address] TEST  11/25/2012    abnormal stress test    COLONOSCOPY      CORONARY ANGIOPLASTY      stents x2 by Dr Roverto Pedraza to marginal and circumflex    CORONARY ANGIOPLASTY WITH STENT PLACEMENT  11/27/2012    primary stenting  with NAZANIN  to 1st OMB of Left CX and pimary NAZANIN to distal  Left CX    CYST REMOVAL  03/11/2015    FROM ABOVE RT EYE BROW    DENTAL SURGERY  04/04/2012    full mouth dental extractions    DIAGNOSTIC CARDIAC CATH LAB PROCEDURE      ECHO COMPL W DOP COLOR FLOW  11/26/2012         GASTRIC FUNDOPLICATION N/A 1/95/5072    LAPAROSCOPIC NISSEN FUNDOPLICATION performed by Israel Dotson MD at [address]  11/29/2017    Dr. Javy Euceda  7/23/15    WITH BIOPSY    UPPER GASTROINTESTINAL ENDOSCOPY N/A 3/16/2018    EGD BIOPSY performed by Israel Dotson MD at [place] [address] History   Problem Relation Age of Onset    Cancer Mother         lung cancer    Cancer Father         lung cancer    Diabetes Brother     High Blood Pressure Brother     High Blood Pressure Brother        Social History     Socioeconomic History    Marital status: Single     Spouse name: Not on file    Number of children: Not on file    Years of education: Not on file    Highest education level: Not on file   Occupational History    Not on file   Social Needs    Financial resource strain: Not on file    Food insecurity:     Worry: Not on file     Inability: Not on file    Transportation needs:     Medical: Not on file     Non-medical: Not on file Tobacco Use    Smoking status: Former Smoker     Packs/day: 0.50     Years: 31.00     Pack years: 15.50     Types: Cigarettes     Last attempt to quit: 2020     Years since quittin.0    Smokeless tobacco: Never Used   Substance and Sexual Activity    Alcohol use: No    Drug use: No     Comment: past marijuana  use;  last used     Sexual activity: Yes     Partners: Female   Lifestyle    Physical activity:     Days per week: Not on file     Minutes per session: Not on file    Stress: Not on file   Relationships    Social connections:     Talks on phone: Not on file     Gets together: Not on file     Attends Congregational service: Not on file     Active member of club or organization: Not on file     Attends meetings of clubs or organizations: Not on file     Relationship status: Not on file    Intimate partner violence:     Fear of current or ex partner: Not on file     Emotionally abused: Not on file     Physically abused: Not on file     Forced sexual activity: Not on file   Other Topics Concern    Not on file   Social History Narrative    Drinks 1-2 cups of coffee daily. Occupation: disability  Retired:  NO        REVIEW OF SYSTEMS: <<For Level 5, 10 or more systems>> Approximately 20 minutes was spent with patient, utilizing slides and handouts, related to receiving radiation therapy to the brain related to metastatic disease as noted on the MRI of the Brain completed on 2020. Patient had previously been admitted to the hospital on 2020 related to shortness of breath/cough/episode of dizziness while driving. At that time a CTA of the chest showed a large infiltrative mass in the right hilum extending to the right apex of the lung. Cytology from thoracentesis on 2020 while admitted to the hospital showed suspicion for adenocarcinoma. Bronchoscopy completed by Dr Crystal Judd on 2020 was also positive for malignant cells as well.  Patient has a history of Tonsillar Squamous Cell Carcinoma treated by Dr Kenyon Tyler, Radiation Oncologist and Dr Ortega Pappas, Medical Oncologist at The [address] [place] [address]. All patient's questions were answered today from a nursing perspective, with patient expressing understanding of the information presented today. Physics notified regarding previous radiation treatment. Pacemaker/Defibulator/ICD:  No    Mediport: No        FALLS RISK SCREENING ASSESSMENT    Instructions:  Assess the patient and enter the appropriate indicators that are present for fall risk identification. Total the numbers entered and assign a fall risk score from Table 2.  Reassess patient at a minimum every 12 weeks or with status change. Assessment   Date  3/4/2020     1. Mental Ability: confusion/cognitively impaired No - 0       2. Elimination Issues: incontinence, frequency No - 0       3. Ambulatory: use of assistive devices (walker, cane, off-loading devices), attached to equipment (IV pole, oxygen) No - 0     4. Sensory Limitations: dizziness, vertigo, impaired vision Yes - 3       5. Age 72 years or greater - 1       10. Medication: diuretics, strong analgesics, hypnotics, sedatives, antihypertensive agents   Yes - 3   7. Falls:  recent history of falls within the last 3 months (not to include slipping or tripping)   No - 0   TOTAL 7    If score of 4 or greater was education given? Yes       TABLE 2   Risk Score Risk Level Plan of Care   0-3 Little or  No Risk 1. Provide assistance as indicated for ambulation activities  2. Reorient confused/cognitively impaired patient  3. Call-light/bell within patient's reach  4. Chair/bed in low position, stretcher/bed with siderails up except when performing patient care activities  5. Educate patient/family/caregiver on falls prevention  6.  Reassess in 12 weeks or with any noted change in patient condition which places them at a risk for a fall   4-6 Moderate Risk 1.   Provide assistance as indicated for ambulation activities  2. Reorient confused/cognitively impaired patient  3. Call-light/bell within patient's reach  4. Chair/bed in low position, stretcher/bed with siderails up except when performing patient care activities  5. Educate patient/family/caregiver on falls prevention  6. Falls risk precaution (Yellow sticker Level II) placed on patient chart   7 or   Higher High Risk 1. Place patient in easily observable treatment room  2. Patient attended at all times by family member or staff  3. Provide assistance as indicated for ambulation activities  4. Reorient confused/cognitively impaired patient  5. Call-light/bell within patient's reach  6. Chair/bed in low position, stretcher/bed with siderails up except when performing patient care activities  7. Educate patient/family/caregiver on falls prevention  8. Falls risk precaution (Yellow sticker Level III) placed on patient chart           MALNUTRITION RISK SCREENING ASSESSMENT    Instructions:  Assess the patient and enter the appropriate indicators that are present for nutrition risk identification. Total the numbers entered and assign a risk score. Follow the appropriate action for total score listed below. Assessment   Date  3/4/2020     1. Have you lost weight without trying? 1- Yes, 0.5 kg to 5 kg     2. Have you been eating poorly because of a decreased appetite? 1- Yes   3. Do you have a diagnosis of head and neck cancer?       1- Yes                                                                                    TOTAL 3          Score of 0-1: No action  Score 2 or greater:  · For Non-Diabetic Patient: Recommend adding Ensure Complete 2 x daily and provide patient with Ensure wellness bag with coupons  · For Diabetic Patient: Recommend adding Glucerna Shake 2 x daily and provide patient with Glucerna Wellness bag with coupons  · Route to the dietitian via [employer]    · Are you having  difficulty performing daily routine tasks  due to fatigue or weakness (ie: bathing/showering, dressing, housework, meal prep, work, child Veronika Ed): No     · Do you have any arm flexibility/ROM restrictions, swelling or pain that limit activity: No     · Any changes in memory, attention/focus that impact daily activities: No     · Do you avoid participation in leisure/social activity due weakness, fatigue or pain: No     ARE ANY OF THE ABOVE ARE ANSWERED YES: No          PT ASSESSMENT FOR REFERRAL    · Have you had any recent falls in past 2 months: No     · Do you have difficulty  going up/down stairs: No     · Are you having difficulty walking: No     · Do you often hold onto furniture/environmental supports or feel off balance when you are walking: No     · Do you need to take rest breaks when you are walking: No     · Any pain on scale of 1-10 that limits your mobility: Yes and No 0/10-patient states he administered pain medication prior to appointment time    ARE ANY OF THE ABOVE ARE ANSWERED YES: No           LYMPHEDEMA SCREENING ASSESSMENT FOR PATIENTS WITH BREAST CANCER    The patient reports the following signs/symptoms of lymphedema: None    Please ask the provider to assess patient for lymphedema for any reported signs or symptoms so a referral to Lymphedema Therapy can be considered. PREHAB AUDIOLOGY REFERRAL    - Is patient planned to receive Cisplatin? No. This patient is not planned to start Cisplatin. - Is patient planned to receive radiation therapy that may be directed toward auditory canals or nerves? No. Patient is not planned to start radiation therapy to auditory canals or nerves. - Is patient complaining of new onset hearing loss? No. Patient is not complaining of new onset hearing loss. Patient education given on receiving radiation therapy to the brain   The patient expresses understanding and acceptance of instructions.  Suzan Yadav 3/4/2020 9:23 AM           Jocelin Montiel

## 2020-03-04 NOTE — PATIENT INSTRUCTIONS
Gregorio butler      Edwin. Quincy Gillis MD [place] [address] Oncology  Cell: [phone]    [facility]:  [phone]   FAX: [phone]  [facility]:  [address] [place] [address] Avenue:    [phone]  [address] [place] [address] Road:  [phone]   FAX:  [phone]  Email: [email or web address]

## 2020-03-05 ENCOUNTER — HOSPITAL ENCOUNTER (OUTPATIENT)
Dept: RADIATION ONCOLOGY | Age: 70
Discharge: HOME | End: 2020-03-05
Attending: RADIOLOGY
Payer: MEDICARE

## 2020-03-05 PROCEDURE — 77370 RADIATION PHYSICS CONSULT: CPT | Performed by: RADIOLOGY

## 2020-03-05 PROCEDURE — 77307 TELETHX ISODOSE PLAN CPLX: CPT | Performed by: RADIOLOGY

## 2020-03-05 PROCEDURE — 77412 RADIATION TX DELIVERY LVL 3: CPT | Performed by: RADIOLOGY

## 2020-03-05 PROCEDURE — 77417 THER RADIOLOGY PORT IMAGE(S): CPT | Performed by: RADIOLOGY

## 2020-03-05 PROCEDURE — 77334 RADIATION TREATMENT AID(S): CPT | Performed by: RADIOLOGY

## 2020-03-06 ENCOUNTER — HOSPITAL ENCOUNTER (OUTPATIENT)
Dept: RADIATION ONCOLOGY | Age: 70
Discharge: HOME | End: 2020-03-06
Attending: RADIOLOGY
Payer: MEDICARE

## 2020-03-06 ENCOUNTER — TELEPHONE (OUTPATIENT)
Dept: RADIATION ONCOLOGY | Age: 70
End: 2020-03-06

## 2020-03-06 PROCEDURE — 77412 RADIATION TX DELIVERY LVL 3: CPT | Performed by: RADIOLOGY

## 2020-03-09 ENCOUNTER — HOSPITAL ENCOUNTER (OUTPATIENT)
Dept: RADIATION ONCOLOGY | Age: 70
Discharge: HOME | End: 2020-03-09
Attending: RADIOLOGY
Payer: MEDICARE

## 2020-03-09 PROCEDURE — 77412 RADIATION TX DELIVERY LVL 3: CPT | Performed by: RADIOLOGY

## 2020-03-10 ENCOUNTER — HOSPITAL ENCOUNTER (OUTPATIENT)
Dept: RADIATION ONCOLOGY | Age: 70
Discharge: HOME | End: 2020-03-10
Attending: RADIOLOGY
Payer: MEDICARE

## 2020-03-10 PROCEDURE — 77412 RADIATION TX DELIVERY LVL 3: CPT | Performed by: RADIOLOGY

## 2020-03-11 ENCOUNTER — HOSPITAL ENCOUNTER (OUTPATIENT)
Dept: RADIATION ONCOLOGY | Age: 70
Discharge: HOME | End: 2020-03-11
Attending: RADIOLOGY
Payer: MEDICARE

## 2020-03-11 ENCOUNTER — HOSPITAL ENCOUNTER (OUTPATIENT)
Dept: RADIATION ONCOLOGY | Age: 70
Discharge: HOME | End: 2020-03-11
Payer: MEDICARE

## 2020-03-11 VITALS
BODY MASS INDEX: 27.8 KG/M2 | OXYGEN SATURATION: 94 % | SYSTOLIC BLOOD PRESSURE: 138 MMHG | RESPIRATION RATE: 18 BRPM | HEART RATE: 101 BPM | DIASTOLIC BLOOD PRESSURE: 96 MMHG | WEIGHT: 205 LBS

## 2020-03-11 PROCEDURE — 77336 RADIATION PHYSICS CONSULT: CPT | Performed by: RADIOLOGY

## 2020-03-11 PROCEDURE — 77412 RADIATION TX DELIVERY LVL 3: CPT | Performed by: RADIOLOGY

## 2020-03-11 NOTE — ADDENDUM NOTE
Encounter addended by: Miguel Cage RD, LD on: 3/11/2020 2:33 PM   Actions taken: Clinical Note Signed

## 2020-03-11 NOTE — PROGRESS NOTES
Jarrod Rivera Jr.  3/11/2020  Wt Readings from Last 10 Encounters:   03/11/20 205 lb (93 kg)   03/04/20 205 lb (93 kg)   02/28/20 208 lb (94.3 kg)   02/27/20 208 lb (94.3 kg)   02/21/20 204 lb (92.5 kg)   02/20/20 208 lb 11.2 oz (94.7 kg)   02/13/20 211 lb 12.8 oz (96.1 kg)   02/05/20 208 lb 9.6 oz (94.6 kg)   01/09/20 214 lb (97.1 kg)   12/11/19 215 lb (97.5 kg)     Ht Readings from Last 1 Encounters:   02/28/20 6' (1.829 m)     Body mass index is 27.8 kg/m². Met with patient today following radiation, he is being treated to his Brain. He reports a decreased appetite and taste change with the radiation and the oral medication he is on. He notes that some nights he can only do a couple bites of food, then relies on Boost for the rest of his meal. He has lot about 6# in two weeks. He states he cannot afford to continue to buy the Boost. Spoke with patient and encouraged him to continue to take the Ensure or Boost twice daily, will increase to the Enlive/Plus version to meet calorie and protein needs. He was receptive. Will submit through [employer] for coverage. Weight change: 6# in two weeks (3%, 10# in 3mo (5%)  Appetite: fair  N/V/D/C: mild nausea  Calculated Needs if applicable:    Pre-Hab Eligible?: no        Recommendations:  Add Ensure Enlive BID to provide 700kcal, 40gm PRO          ASPEN GUIDELINES FOR CLINICAL CHARACTERISTICS OF MALNUTRITION IN CHRONIC ILLNESS     Moderate Malnutrition  Severe Malnutrition    Energy intake  <75% energy intake compared to estimated needs for >1month <75% energy intake compared to estimated needs for >1month   Weight changes  5% x 1 month  7.5% x 3 months   10% x 6 months   20% x 1 year  >5% x 1 month  >7.5% x 3 months   >10% x 6 months   >20% x 1 year    Physical findings  Mild   Decrease subcutaneous fat    Decrease muscle mass     Increase fluid/edema   Severe  Decrease subcutaneous fat    Decrease muscle mass     Increase fluid/edema      At risk for malnutrition

## 2020-03-12 ENCOUNTER — HOSPITAL ENCOUNTER (OUTPATIENT)
Dept: RADIATION ONCOLOGY | Age: 70
Discharge: HOME | End: 2020-03-12
Attending: RADIOLOGY
Payer: MEDICARE

## 2020-03-12 PROCEDURE — 77412 RADIATION TX DELIVERY LVL 3: CPT | Performed by: RADIOLOGY

## 2020-03-12 PROCEDURE — 77417 THER RADIOLOGY PORT IMAGE(S): CPT | Performed by: RADIOLOGY

## 2020-03-13 ENCOUNTER — OFFICE VISIT (OUTPATIENT)
Dept: ONCOLOGY | Age: 70
End: 2020-03-13
Payer: MEDICARE

## 2020-03-13 ENCOUNTER — HOSPITAL ENCOUNTER (OUTPATIENT)
Dept: RADIATION ONCOLOGY | Age: 70
Discharge: HOME | End: 2020-03-13
Attending: RADIOLOGY
Payer: MEDICARE

## 2020-03-13 ENCOUNTER — HOSPITAL ENCOUNTER (OUTPATIENT)
Dept: INFUSION THERAPY | Age: 70
Discharge: HOME | End: 2020-03-13
Payer: MEDICARE

## 2020-03-13 VITALS
BODY MASS INDEX: 27.82 KG/M2 | SYSTOLIC BLOOD PRESSURE: 131 MMHG | TEMPERATURE: 98.2 F | OXYGEN SATURATION: 93 % | HEART RATE: 115 BPM | DIASTOLIC BLOOD PRESSURE: 81 MMHG | HEIGHT: 72 IN | WEIGHT: 205.4 LBS

## 2020-03-13 PROCEDURE — G8482 FLU IMMUNIZE ORDER/ADMIN: HCPCS | Performed by: INTERNAL MEDICINE

## 2020-03-13 PROCEDURE — G8417 CALC BMI ABV UP PARAM F/U: HCPCS | Performed by: INTERNAL MEDICINE

## 2020-03-13 PROCEDURE — 77412 RADIATION TX DELIVERY LVL 3: CPT | Performed by: RADIOLOGY

## 2020-03-13 PROCEDURE — 1036F TOBACCO NON-USER: CPT | Performed by: INTERNAL MEDICINE

## 2020-03-13 PROCEDURE — 3017F COLORECTAL CA SCREEN DOC REV: CPT | Performed by: INTERNAL MEDICINE

## 2020-03-13 PROCEDURE — G8427 DOCREV CUR MEDS BY ELIG CLIN: HCPCS | Performed by: INTERNAL MEDICINE

## 2020-03-13 PROCEDURE — 1111F DSCHRG MED/CURRENT MED MERGE: CPT | Performed by: INTERNAL MEDICINE

## 2020-03-13 PROCEDURE — 99215 OFFICE O/P EST HI 40 MIN: CPT | Performed by: INTERNAL MEDICINE

## 2020-03-13 PROCEDURE — 4040F PNEUMOC VAC/ADMIN/RCVD: CPT | Performed by: INTERNAL MEDICINE

## 2020-03-13 PROCEDURE — 1123F ACP DISCUSS/DSCN MKR DOCD: CPT | Performed by: INTERNAL MEDICINE

## 2020-03-13 PROCEDURE — 99214 OFFICE O/P EST MOD 30 MIN: CPT | Performed by: INTERNAL MEDICINE

## 2020-03-13 NOTE — PROGRESS NOTES
[address] MED ONCOLOGY  [facility] [address]  Dept: [phone]  Attending Consult Note      Reason for Visit:   Non-small cell lung cancer, metastatic to the brain. Referring Physician:  Lilian Theodore MD    PCP:  Prashant Lomas MD    History of Present Illness:    81 y.o. male with a past medical hx of MI, HTN, DDD, CAD, and tonsillar squamous cell carcinoma. He was treated for his tonsillar cacrcinoma by Dr. Clara Lin at the [facility] in 2009 with Cisplatin and 5-Fu and radiation.      Hx smoking 0.52 packs of cigarettes per day for 31 years.      Admitted to the ED on 02/19/2020 with lung mass after complaints of cough, dizziness, and SOB beginning 2 months ago.      CTA chest 02/19/2020:  Large infiltrative mass in the right hilum extending to the right apex  concerning for malignancy with narrowing of the right upper lobe  bronchus and segmental branches of right upper lobe pulmonary artery which are occluded and invaded by tumor. No other central pulmonary embolism is identified. Multiple bilateral pulmonary nodules are identified which may represent metastasis. Further assessment by PET/CT scan and/or bronchoscopy is recommended.   Large pleural effusion and atelectasis in the right lower lobe.     CT abdomen/pelvis 02/19/2020:  Large pleural effusion in right lung base with atelectasis in the  right lower lobe and minimal pleural effusion in left base. Pneumonia is considered. Partially identified are multiple pulmonary nodules with  lymph nodes  in the cardiophrenic angle and upper abdomen concerning for  malignancy. Consider PET CT scan. Cystic lesions in liver and kidneys. There is no acute inflammation or bowel obstruction. There is thickening of the GE junction.       He is s/p right thoracentesis on 2/20/2020, 100 mL of straw-colored/clear pleural fluid removed, cytology was negative for malignant cells.     He had on 2/28/2020 [facility]/EBUS with FNA of station L4, station 7, cytology was positive for malignant cells, adenocarcinoma. He had a brain MRI done on 2/21/2020 revealingLeptomeningeal enhancement with possible involvement of the underlying cortex involving the left frontal gyrus. Considering history of lung mass, this is suspicious for metastatic disease. Other infectious and  inflammatory etiologies are also considered with primary malignancy felt less likely. He was referred to neurosurgeon lesion oncology, he is undergoing WBRT. Review of Systems;    CONSTITUTIONAL: No fever, chills. Positive for decreased appetite . Fatigue. ENMT: Eyes: No diplopia; Nose: No epistaxis. Mouth: No sore throat. RESPIRATORY: No hemoptysis, positive shortness of breath and cough. CARDIOVASCULAR: No chest pain, palpitations. GASTROINTESTINAL: No nausea/vomiting, abdominal pain, diarrhea/constipation. GENITOURINARY: No dysuria, urinary frequency, hematuria. NEURO: No syncope, presyncope, he has mild headaches.   Remainder:  ROS NEGATIVE  Past Medical History:      Diagnosis Date    CAD (coronary artery disease)     DDD (degenerative disc disease) 2/1/2011    Hiatal hernia with gastroesophageal reflux disease and esophagitis     Hyperlipidemia     Hypertension     Inguinal hernia     left  S/P repair 11/29/17    Intervertebral disk disease     Lung cancer ([address]     MI (myocardial infarction) ([address]     Pneumonia 2/21/2020    Primary tonsillar squamous cell carcinoma ([address] 7/8/2013    radiation to area    Psychiatric problem     S/P cardiac cath 2/21/2020     Patient Active Problem List   Diagnosis    Hypertension    ED (erectile dysfunction)    CAD (coronary artery disease)    Percutaneous transluminal coronary angioplasty status    Osteoarthritis of lumbar spine    Tobacco abuse    Primary tonsillar squamous cell carcinoma ([address]    Hiatal hernia with gastroesophageal reflux disease and esophagitis

## 2020-03-13 NOTE — PATIENT INSTRUCTIONS
2/28/2020    BRONCHOSCOPY  EBUS , APC CRYO performed by Ladonna Riley MD at [address] [facility]  2/28/2020    BRONCHOSCOPY/TRANSBRONCHIAL NEEDLE BIOPSY performed by Ladonna Riley MD at [address] [facility]  2/28/2020    BRONCHOSCOPY/TRANSBRONCHIAL NEEDLE BIOPSY ADDL LOBE performed by Ladonna Riley MD at [address] TEST  11/25/2012    abnormal stress test    COLONOSCOPY      CORONARY ANGIOPLASTY      stents x2 by Dr Lorenza Cage to marginal and circumflex    CORONARY ANGIOPLASTY WITH STENT PLACEMENT  11/27/2012    primary stenting  with NAZANIN  to 1st OMB of Left CX and pimary NAZANIN to distal  Left CX    CYST REMOVAL  03/11/2015    FROM ABOVE RT EYE BROW    DENTAL SURGERY  04/04/2012    full mouth dental extractions    DIAGNOSTIC CARDIAC CATH LAB PROCEDURE      ECHO COMPL W DOP COLOR FLOW  11/26/2012         GASTRIC FUNDOPLICATION N/A 0/21/1573    LAPAROSCOPIC NISSEN FUNDOPLICATION performed by Radha Aguiar MD at [address]  11/29/2017    Dr. Christo Estevez  7/23/15    WITH BIOPSY    UPPER GASTROINTESTINAL ENDOSCOPY N/A 3/16/2018    EGD BIOPSY performed by Radha Aguiar MD at [address] History   Problem Relation Age of Onset    Cancer Mother         lung cancer    Cancer Father         lung cancer    Diabetes Brother     High Blood Pressure Brother     High Blood Pressure Brother        Social History     Socioeconomic History    Marital status: Single     Spouse name: Not on file    Number of children: Not on file    Years of education: Not on file    Highest education level: Not on file   Occupational History    Not on file   Social Needs    Financial resource strain: Not on file    Food insecurity     Worry: Not on file     Inability: Not on file    Transportation needs     Medical: Not on file     Non-medical: Not on file   Tobacco Use    Smoking status: Former Smoker     Packs/day: 0.50     Years: 31.00     Pack years: 15.50     Types: Cigarettes     Last attempt to quit: 2020     Years since quittin.1    Smokeless tobacco: Never Used   Substance and Sexual Activity    Alcohol use: No    Drug use: No     Comment: past marijuana  use;  last used     Sexual activity: Yes     Partners: Female   Lifestyle    Physical activity     Days per week: Not on file     Minutes per session: Not on file    Stress: Not on file   Relationships    Social connections     Talks on phone: Not on file     Gets together: Not on file     Attends Advent service: Not on file     Active member of club or organization: Not on file     Attends meetings of clubs or organizations: Not on file     Relationship status: Not on file    Intimate partner violence     Fear of current or ex partner: Not on file     Emotionally abused: Not on file     Physically abused: Not on file     Forced sexual activity: Not on file   Other Topics Concern    Not on file   Social History Narrative    Drinks 1-2 cups of coffee daily. Chronic pain. Occupation: n/a  Retired:  YES: Patient is retired from  [employer]. REVIEW OF SYSTEMS: Patient states a history of hypertension, hyperlipidemia, throat/tonsil cancer, and recent diagnosis of lung cancer. Pacemaker/Defibulator/ICD:  No    Mediport: No           FALLS RISK SCREENING ASSESSMENT    Instructions:  Assess the patient and [place] the appropriate indicators that are present for fall risk identification. Total the numbers circled and assign a fall risk score from Table 2.  Reassess patient at a minimum every 12 weeks or with status change. Assessment   Date  3/13/2020     1. Mental Ability: confusion/cognitively impaired No - 0       2. Elimination Issues: incontinence, frequency No - 0       3.   Ambulatory: use of assistive devices (walker, cane, off-loading devices), attached to equipment (IV pole, oxygen) No -

## 2020-03-16 ENCOUNTER — HOSPITAL ENCOUNTER (OUTPATIENT)
Dept: RADIATION ONCOLOGY | Age: 70
Discharge: HOME | End: 2020-03-16
Attending: RADIOLOGY
Payer: MEDICARE

## 2020-03-16 PROCEDURE — 77412 RADIATION TX DELIVERY LVL 3: CPT | Performed by: RADIOLOGY

## 2020-03-16 RX ORDER — LOSARTAN POTASSIUM 100 MG/1
TABLET ORAL
Qty: 90 TABLET | Refills: 1 | Status: SHIPPED | OUTPATIENT
Start: 2020-03-16

## 2020-03-16 NOTE — TELEPHONE ENCOUNTER
Last Appointment:  2/27/2020  Future Appointments   Date Time Provider Lionel Lorenzana   3/16/2020  1:45 PM SCHEDULE, SEYZ LINAC 2 SEYZ Rad Onc [facility]   3/17/2020  1:45 PM SCHEDULE, SEYZ LINAC 2 SEYZ Rad Onc [facility]   3/18/2020 11:45 AM SCHEDULE, SEYZ LINAC 2 SEYZ Rad Onc [facility]   3/19/2020  1:45 PM SCHEDULE, SEYZ LINAC 2 SEYZ Rad Onc [facility]   3/20/2020 11:00 AM SCHEDULE, SEYZ LINAC 2 SEYZ Rad Onc [facility]   3/23/2020  1:45 PM SCHEDULE, SEYZ LINAC 2 SEYZ Rad Onc [facility]   3/24/2020  9:30 AM [facility] PET ROOM SEYZ NM [facility]   3/24/2020  1:45 PM SCHEDULE, SEYZ LINAC 2 SEYZ Rad Onc [facility]   3/25/2020  1:45 PM SCHEDULE, SEYZ LINAC 2 SEYZ Rad Onc [facility]   3/27/2020  1:40 PM Missy Lance MD [facility]   3/27/2020  2:30 PM Meche Alvarenga MD MED ONC [facility]   3/27/2020  2:30 PM SEYZ MED ONC FAST TRACK 2 SEYZ Med Onc [facility]

## 2020-03-17 ENCOUNTER — HOSPITAL ENCOUNTER (OUTPATIENT)
Dept: RADIATION ONCOLOGY | Age: 70
Discharge: HOME | End: 2020-03-17
Attending: RADIOLOGY
Payer: MEDICARE

## 2020-03-17 PROCEDURE — 77412 RADIATION TX DELIVERY LVL 3: CPT | Performed by: RADIOLOGY

## 2020-03-18 ENCOUNTER — HOSPITAL ENCOUNTER (OUTPATIENT)
Dept: RADIATION ONCOLOGY | Age: 70
Discharge: HOME | End: 2020-03-18
Attending: RADIOLOGY
Payer: MEDICARE

## 2020-03-18 ENCOUNTER — HOSPITAL ENCOUNTER (OUTPATIENT)
Dept: RADIATION ONCOLOGY | Age: 70
Discharge: HOME | End: 2020-03-18
Payer: MEDICARE

## 2020-03-18 VITALS
WEIGHT: 204.31 LBS | SYSTOLIC BLOOD PRESSURE: 126 MMHG | HEART RATE: 90 BPM | DIASTOLIC BLOOD PRESSURE: 92 MMHG | BODY MASS INDEX: 27.71 KG/M2 | TEMPERATURE: 97.9 F | RESPIRATION RATE: 20 BRPM | OXYGEN SATURATION: 94 %

## 2020-03-18 PROCEDURE — 99999 PR OFFICE/OUTPT VISIT,PROCEDURE ONLY: CPT | Performed by: RADIOLOGY

## 2020-03-18 PROCEDURE — 77412 RADIATION TX DELIVERY LVL 3: CPT | Performed by: RADIOLOGY

## 2020-03-18 PROCEDURE — 77336 RADIATION PHYSICS CONSULT: CPT | Performed by: RADIOLOGY

## 2020-03-18 ASSESSMENT — PAIN DESCRIPTION - ORIENTATION: ORIENTATION: RIGHT

## 2020-03-18 ASSESSMENT — PAIN DESCRIPTION - DESCRIPTORS: DESCRIPTORS: ACHING

## 2020-03-18 ASSESSMENT — PAIN DESCRIPTION - FREQUENCY: FREQUENCY: INTERMITTENT

## 2020-03-18 ASSESSMENT — PAIN DESCRIPTION - PAIN TYPE: TYPE: CHRONIC PAIN

## 2020-03-18 ASSESSMENT — PAIN SCALES - GENERAL: PAINLEVEL_OUTOF10: 8

## 2020-03-18 ASSESSMENT — PAIN DESCRIPTION - LOCATION: LOCATION: SHOULDER;BACK

## 2020-03-19 ENCOUNTER — TELEPHONE (OUTPATIENT)
Dept: FAMILY MEDICINE CLINIC | Age: 70
End: 2020-03-19

## 2020-03-19 ENCOUNTER — HOSPITAL ENCOUNTER (OUTPATIENT)
Dept: RADIATION ONCOLOGY | Age: 70
Discharge: HOME | End: 2020-03-19
Attending: RADIOLOGY
Payer: MEDICARE

## 2020-03-19 ENCOUNTER — TELEPHONE (OUTPATIENT)
Dept: RADIATION ONCOLOGY | Age: 70
End: 2020-03-19

## 2020-03-19 PROCEDURE — 77417 THER RADIOLOGY PORT IMAGE(S): CPT | Performed by: RADIOLOGY

## 2020-03-19 PROCEDURE — 77412 RADIATION TX DELIVERY LVL 3: CPT | Performed by: RADIOLOGY

## 2020-03-19 RX ORDER — CLOTRIMAZOLE 10 MG/1
10 LOZENGE ORAL; TOPICAL
COMMUNITY

## 2020-03-19 NOTE — TELEPHONE ENCOUNTER
Dexamethasone 2 mg PO Q AM daily  x 10 days called to [facility], patient's pharmacy per Dr. Carmelo Middleton request.

## 2020-03-19 NOTE — PROGRESS NOTES
DEPARTMENT OF RADIATION ONCOLOGY ON TREATMENT VISIT         3/19/2020      NAME:  Stanley Rowe. YOB: 1950     Diagnosis:  cancer / lung    SUBJECTIVE:   Stanley Rowe. has now received fractionated external beam radiation therapy - ongoing. Past medical, surgical, social and family histories reviewed and updated as indicated. Pain: controlled    ALLERGIES:  Patient has no known allergies.          Current Outpatient Medications   Medication Sig Dispense Refill    clotrimazole (MYCELEX) 10 MG daryl Take 10 mg by mouth 5 times daily      losartan (COZAAR) 100 MG tablet take 1 tablet by mouth IN THE MORNING 90 tablet 1    memantine (NAMENDA) 10 MG tablet Take 1 tablet by mouth 2 times daily 60 tablet 5    tiotropium (SPIRIVA HANDIHALER) 18 MCG inhalation capsule Inhale 18 mcg into the lungs daily      albuterol sulfate HFA (PROVENTIL HFA) 108 (90 Base) MCG/ACT inhaler Inhale 2 puffs into the lungs 4 times daily 1 Inhaler 3    benzonatate (TESSALON) 200 MG capsule Take 1 capsule by mouth 3 times daily as needed for Cough 90 capsule 0    nicotine (NICODERM CQ) 14 MG/24HR Place 1 patch onto the skin daily (Patient not taking: Reported on 3/13/2020) 30 patch 3    tiotropium (SPIRIVA HANDIHALER) 18 MCG inhalation capsule Inhale 1 capsule into the lungs daily 30 capsule 0    ipratropium-albuterol (DUONEB) 0.5-2.5 (3) MG/3ML SOLN nebulizer solution Inhale 3 mLs into the lungs every 4 hours as needed for Shortness of Breath 360 mL 5    cloNIDine (CATAPRES) 0.2 MG tablet take 1 tablet by mouth twice a day 180 tablet 0    amLODIPine (NORVASC) 10 MG tablet take 1 tablet by mouth every morning BEFORE BREAKFAST 90 tablet 0    allopurinol (ZYLOPRIM) 100 MG tablet take 1 tablet by mouth twice a day 180 tablet 0    hydrochlorothiazide (HYDRODIURIL) 12.5 MG tablet take 1 tablet by mouth once daily 90 tablet 0    oxyCODONE-acetaminophen (PERCOCET) 7.5-325 MG per tablet take 1

## 2020-03-20 ENCOUNTER — HOSPITAL ENCOUNTER (OUTPATIENT)
Dept: RADIATION ONCOLOGY | Age: 70
Discharge: HOME | End: 2020-03-20
Attending: RADIOLOGY
Payer: MEDICARE

## 2020-03-20 PROCEDURE — 77412 RADIATION TX DELIVERY LVL 3: CPT | Performed by: RADIOLOGY

## 2020-03-23 ENCOUNTER — TELEPHONE (OUTPATIENT)
Dept: FAMILY MEDICINE CLINIC | Age: 70
End: 2020-03-23

## 2020-03-23 ENCOUNTER — HOSPITAL ENCOUNTER (OUTPATIENT)
Dept: RADIATION ONCOLOGY | Age: 70
Discharge: HOME | End: 2020-03-23
Attending: RADIOLOGY
Payer: MEDICARE

## 2020-03-23 LAB
FUNGUS (MYCOLOGY) CULTURE: NORMAL
FUNGUS STAIN: NORMAL

## 2020-03-23 PROCEDURE — 77412 RADIATION TX DELIVERY LVL 3: CPT | Performed by: RADIOLOGY

## 2020-03-23 RX ORDER — TRAZODONE HYDROCHLORIDE 50 MG/1
50 TABLET ORAL NIGHTLY
Qty: 90 TABLET | Refills: 1 | Status: SHIPPED
Start: 2020-03-23 | End: 2020-06-03 | Stop reason: SDUPTHER

## 2020-03-23 NOTE — TELEPHONE ENCOUNTER
Phoned patient to reschedule his appointment patients stated that he is going though chemo and is very tired but having a hard time sleeping  Would like to see I doctor could call something in for him  Please advise  Thank you April

## 2020-03-24 ENCOUNTER — HOSPITAL ENCOUNTER (OUTPATIENT)
Dept: RADIATION ONCOLOGY | Age: 70
Discharge: HOME | End: 2020-03-24
Attending: RADIOLOGY
Payer: MEDICARE

## 2020-03-24 ENCOUNTER — HOSPITAL ENCOUNTER (OUTPATIENT)
Dept: NUCLEAR MEDICINE | Age: 70
Discharge: HOME | End: 2020-03-26
Payer: MEDICARE

## 2020-03-24 PROCEDURE — 77412 RADIATION TX DELIVERY LVL 3: CPT | Performed by: RADIOLOGY

## 2020-03-24 PROCEDURE — A9552 F18 FDG: HCPCS | Performed by: RADIOLOGY

## 2020-03-24 PROCEDURE — 78815 PET IMAGE W/CT SKULL-THIGH: CPT

## 2020-03-24 PROCEDURE — 3430000000 HC RX DIAGNOSTIC RADIOPHARMACEUTICAL: Performed by: RADIOLOGY

## 2020-03-24 RX ORDER — SODIUM CHLORIDE 0.9 % (FLUSH) 0.9 %
10 SYRINGE (ML) INJECTION PRN
Status: DISCONTINUED | OUTPATIENT
Start: 2020-03-24 | End: 2020-03-27 | Stop reason: HOSPADM

## 2020-03-24 RX ORDER — FLUDEOXYGLUCOSE F 18 200 MCI/ML
17.15 INJECTION, SOLUTION INTRAVENOUS
Status: COMPLETED | OUTPATIENT
Start: 2020-03-24 | End: 2020-03-24

## 2020-03-24 RX ADMIN — FLUDEOXYGLUCOSE F 18 17.15 MILLICURIE: 200 INJECTION, SOLUTION INTRAVENOUS at 11:26

## 2020-03-25 ENCOUNTER — HOSPITAL ENCOUNTER (OUTPATIENT)
Dept: RADIATION ONCOLOGY | Age: 70
Discharge: HOME | End: 2020-03-25
Payer: MEDICARE

## 2020-03-25 ENCOUNTER — HOSPITAL ENCOUNTER (OUTPATIENT)
Dept: RADIATION ONCOLOGY | Age: 70
Discharge: HOME | End: 2020-03-25
Attending: RADIOLOGY
Payer: MEDICARE

## 2020-03-25 VITALS
WEIGHT: 202 LBS | TEMPERATURE: 98.2 F | OXYGEN SATURATION: 93 % | DIASTOLIC BLOOD PRESSURE: 86 MMHG | RESPIRATION RATE: 20 BRPM | HEART RATE: 93 BPM | SYSTOLIC BLOOD PRESSURE: 122 MMHG | BODY MASS INDEX: 27.4 KG/M2

## 2020-03-25 PROCEDURE — 77412 RADIATION TX DELIVERY LVL 3: CPT | Performed by: RADIOLOGY

## 2020-03-25 PROCEDURE — 77336 RADIATION PHYSICS CONSULT: CPT | Performed by: RADIOLOGY

## 2020-03-25 NOTE — PROGRESS NOTES
[address].  3/25/2020  Wt Readings from Last 3 Encounters:   03/25/20 202 lb (91.6 kg)   03/18/20 204 lb 5 oz (92.7 kg)   03/13/20 205 lb 6.4 oz (93.2 kg)     Body mass index is 27.4 kg/m². Treatment Area:CTV whole brain    Patient was seen today for weekly visit. Comfort Alteration  KPS:60%  Fatigue: Moderate      CNS Alteration  Depressed Level of Consciousness alert and oriented x 4  Orientation: time place person  Neuropathy-Motor: ni  Ataxia: Absent   Speech Impairment: No  Seizures: No  Urinary Incontinence: No  Bowel Incontinence: No  Insomnia: No    Sensory Alteration: na    Nutritional Alteration  Anorexia: Yes   Nausea: No   Vomiting: No    Skin Alteration   Sensation:na    Radiation Dermatitis:  na    Alopecia: No/ shaved his head prior tratement    Emotional  Coping: effective    Injury, potential bleeding or infection: na          Lab Results   Component Value Date    GLUCOSE 111 (H) 02/20/2020       /86   Pulse 93   Temp 98.2 °F (36.8 °C) (Tympanic)   Resp 20   Wt 202 lb (91.6 kg)   SpO2 93%   BMI 27.40 kg/m²   BP within normal range? yes   -    Assessment/Plan:  Patient completed treatment today, 15/15 fractions, 3750/3750 cGy. Discharge instructions given, patient verbalizes understanding.     Maegan Hussein

## 2020-03-25 NOTE — PROGRESS NOTES
Patient updated that he was approved for [employer] coverage through [employer]. Spoke with Citlalli from [facility], she will be calling patient tomorrow for preferences and delivery.  Fred Martínez RD,,LD

## 2020-03-27 ENCOUNTER — OFFICE VISIT (OUTPATIENT)
Dept: ONCOLOGY | Age: 70
End: 2020-03-27
Payer: MEDICARE

## 2020-03-27 ENCOUNTER — HOSPITAL ENCOUNTER (OUTPATIENT)
Dept: INFUSION THERAPY | Age: 70
Discharge: HOME | End: 2020-03-27
Payer: MEDICARE

## 2020-03-27 ENCOUNTER — INITIAL CONSULT (OUTPATIENT)
Dept: NEUROSURGERY | Age: 70
End: 2020-03-27
Payer: MEDICARE

## 2020-03-27 ENCOUNTER — OFFICE VISIT (OUTPATIENT)
Dept: PALLATIVE CARE | Age: 70
End: 2020-03-27
Payer: MEDICARE

## 2020-03-27 ENCOUNTER — TELEPHONE (OUTPATIENT)
Dept: ONCOLOGY | Age: 70
End: 2020-03-27

## 2020-03-27 VITALS
HEIGHT: 72 IN | RESPIRATION RATE: 20 BRPM | SYSTOLIC BLOOD PRESSURE: 168 MMHG | HEART RATE: 95 BPM | BODY MASS INDEX: 27.77 KG/M2 | DIASTOLIC BLOOD PRESSURE: 92 MMHG | TEMPERATURE: 98.4 F | WEIGHT: 205 LBS

## 2020-03-27 VITALS — TEMPERATURE: 97.7 F

## 2020-03-27 PROBLEM — C34.90 ADENOCARCINOMA, LUNG (HCC): Status: ACTIVE | Noted: 2020-03-27

## 2020-03-27 PROCEDURE — 99214 OFFICE O/P EST MOD 30 MIN: CPT | Performed by: INTERNAL MEDICINE

## 2020-03-27 PROCEDURE — G8482 FLU IMMUNIZE ORDER/ADMIN: HCPCS | Performed by: INTERNAL MEDICINE

## 2020-03-27 PROCEDURE — G8417 CALC BMI ABV UP PARAM F/U: HCPCS | Performed by: INTERNAL MEDICINE

## 2020-03-27 PROCEDURE — 1036F TOBACCO NON-USER: CPT | Performed by: INTERNAL MEDICINE

## 2020-03-27 PROCEDURE — G8428 CUR MEDS NOT DOCUMENT: HCPCS | Performed by: NURSE PRACTITIONER

## 2020-03-27 PROCEDURE — 4040F PNEUMOC VAC/ADMIN/RCVD: CPT | Performed by: INTERNAL MEDICINE

## 2020-03-27 PROCEDURE — 99202 OFFICE O/P NEW SF 15 MIN: CPT | Performed by: NEUROLOGICAL SURGERY

## 2020-03-27 PROCEDURE — G8417 CALC BMI ABV UP PARAM F/U: HCPCS | Performed by: NURSE PRACTITIONER

## 2020-03-27 PROCEDURE — 3017F COLORECTAL CA SCREEN DOC REV: CPT | Performed by: INTERNAL MEDICINE

## 2020-03-27 PROCEDURE — G8482 FLU IMMUNIZE ORDER/ADMIN: HCPCS | Performed by: NEUROLOGICAL SURGERY

## 2020-03-27 PROCEDURE — 3017F COLORECTAL CA SCREEN DOC REV: CPT | Performed by: NURSE PRACTITIONER

## 2020-03-27 PROCEDURE — G8427 DOCREV CUR MEDS BY ELIG CLIN: HCPCS | Performed by: INTERNAL MEDICINE

## 2020-03-27 PROCEDURE — G8427 DOCREV CUR MEDS BY ELIG CLIN: HCPCS | Performed by: NEUROLOGICAL SURGERY

## 2020-03-27 PROCEDURE — 1123F ACP DISCUSS/DSCN MKR DOCD: CPT | Performed by: INTERNAL MEDICINE

## 2020-03-27 PROCEDURE — G8417 CALC BMI ABV UP PARAM F/U: HCPCS | Performed by: NEUROLOGICAL SURGERY

## 2020-03-27 PROCEDURE — 1123F ACP DISCUSS/DSCN MKR DOCD: CPT | Performed by: NURSE PRACTITIONER

## 2020-03-27 PROCEDURE — 99213 OFFICE O/P EST LOW 20 MIN: CPT

## 2020-03-27 PROCEDURE — 4040F PNEUMOC VAC/ADMIN/RCVD: CPT | Performed by: NURSE PRACTITIONER

## 2020-03-27 PROCEDURE — 99213 OFFICE O/P EST LOW 20 MIN: CPT | Performed by: NURSE PRACTITIONER

## 2020-03-27 PROCEDURE — 1036F TOBACCO NON-USER: CPT | Performed by: NURSE PRACTITIONER

## 2020-03-27 PROCEDURE — G8482 FLU IMMUNIZE ORDER/ADMIN: HCPCS | Performed by: NURSE PRACTITIONER

## 2020-03-27 RX ORDER — HEPARIN SODIUM (PORCINE) LOCK FLUSH IV SOLN 100 UNIT/ML 100 UNIT/ML
500 SOLUTION INTRAVENOUS PRN
Status: CANCELLED | OUTPATIENT
Start: 2020-03-27

## 2020-03-27 RX ORDER — SODIUM CHLORIDE 0.9 % (FLUSH) 0.9 %
10 SYRINGE (ML) INJECTION PRN
Status: CANCELLED | OUTPATIENT
Start: 2020-03-27

## 2020-03-27 ASSESSMENT — ENCOUNTER SYMPTOMS
EYES NEGATIVE: 1
ALLERGIC/IMMUNOLOGIC NEGATIVE: 1
GASTROINTESTINAL NEGATIVE: 1
RESPIRATORY NEGATIVE: 1

## 2020-03-27 NOTE — PROGRESS NOTES
Subjective:      Patient ID: Tj Beauchamp. is a 81 y.o. male. HPI  81year old male who presents with a history of lung cancer. He also had brain mets to the left frontal lobe. He recently finished radiation to his intracranial lesion. He has some shortness of breath. He denies any headache or loss of control of bowel or bladder function. Review of Systems   Constitutional: Negative. HENT: Negative. Eyes: Negative. Respiratory: Negative. Cardiovascular: Negative. Gastrointestinal: Negative. Endocrine: Negative. Musculoskeletal: Negative. Skin: Negative. Allergic/Immunologic: Negative. Neurological: Negative. Hematological: Negative. Psychiatric/Behavioral: Negative. Objective:   Physical Exam  Vitals signs reviewed. Constitutional:       General: He is not in acute distress. Appearance: Normal appearance. He is normal weight. He is not ill-appearing, toxic-appearing or diaphoretic. HENT:      Head: Normocephalic and atraumatic. Nose: Nose normal.   Eyes:      General: No visual field deficit or scleral icterus. Right eye: No discharge. Left eye: No discharge. Extraocular Movements: Extraocular movements intact. Conjunctiva/sclera: Conjunctivae normal.      Pupils: Pupils are equal, round, and reactive to light. Cardiovascular:      Pulses: Normal pulses. Pulmonary:      Effort: Pulmonary effort is normal. No respiratory distress. Abdominal:      General: Abdomen is flat. There is no distension. Musculoskeletal:         General: No swelling, tenderness, deformity or signs of injury. Right lower leg: No edema. Left lower leg: No edema. Skin:     Capillary Refill: Capillary refill takes less than 2 seconds. Coloration: Skin is not jaundiced or pale. Findings: No bruising, erythema, lesion or rash. Neurological:      General: No focal deficit present.       Mental Status: He is alert and

## 2020-03-27 NOTE — PROGRESS NOTES
2/28/2020    BRONCHOSCOPY  EBUS , APC CRYO performed by Ran Rosado MD at [address] [facility]  2/28/2020    BRONCHOSCOPY/TRANSBRONCHIAL NEEDLE BIOPSY performed by Ran Rosado MD at [address] [place] [address]  2/28/2020    BRONCHOSCOPY/TRANSBRONCHIAL NEEDLE BIOPSY ADDL LOBE performed by Ran Rosado MD at [address] TEST  11/25/2012    abnormal stress test    COLONOSCOPY      CORONARY ANGIOPLASTY      stents x2 by Dr Lacey Cunningham to marginal and circumflex    CORONARY ANGIOPLASTY WITH STENT PLACEMENT  11/27/2012    primary stenting  with NAZANIN  to 1st OMB of Left CX and pimary NAZANIN to distal  Left CX    CYST REMOVAL  03/11/2015    FROM ABOVE RT EYE BROW    DENTAL SURGERY  04/04/2012    full mouth dental extractions    DIAGNOSTIC CARDIAC CATH LAB PROCEDURE      ECHO COMPL W DOP COLOR FLOW  11/26/2012         GASTRIC FUNDOPLICATION N/A 4/96/2807    LAPAROSCOPIC NISSEN FUNDOPLICATION performed by Du Elmore MD at [address]  11/29/2017    Dr. Ezra Herbert  7/23/15    WITH BIOPSY    UPPER GASTROINTESTINAL ENDOSCOPY N/A 3/16/2018    EGD BIOPSY performed by Du Elmore MD at [facility] History   Problem Relation Age of Onset    Cancer Mother         lung cancer    Cancer Father         lung cancer    Diabetes Brother     High Blood Pressure Brother     High Blood Pressure Brother        ROS: UNLESS STATED ABOVE PATIENT DENIES:  CONSTITUTIONAL:  fever, chill, rigors, nausea, vomiting, fatigue. HEENT: blurry vision, double vision, hearing problem, tinnitus, hoarseness, dysphagia, odynophagia  RESPIRATORY: cough, shortness of breath, sputum expectoration. CARDIOVASCULAR:  Chest pain/pressure, palpitation, syncope, irregular beats  GASTROINTESTINAL:  abdominal or rectal pain, diarrhea, constipation, .   GENITOURINARY:  Burning, frequency, urgency, incontinence, however, inadvertent computerized transcription errors may be present.

## 2020-03-27 NOTE — PROGRESS NOTES
[address] MED ONCOLOGY  [address] [facility] [address]  Dept: [phone]  Attending Consult Note      Reason for Visit:   Metastatic non-small cell lung cancer, metastatic to the brain. Referring Physician:  Socorro Nava MD    PCP:  Leonie Saha MD    History of Present Illness:    81 y.o. male with a past medical hx of MI, HTN, DDD, CAD, and tonsillar squamous cell carcinoma. He was treated for his tonsillar cacrcinoma by Dr. Elena Renee at the [facility] in 2009 with Cisplatin and 5-Fu and radiation.      Hx smoking 0.52 packs of cigarettes per day for 31 years.      Admitted to the ED on 02/19/2020 with lung mass after complaints of cough, dizziness, and SOB beginning 2 months ago.      CTA chest 02/19/2020:  Large infiltrative mass in the right hilum extending to the right apex  concerning for malignancy with narrowing of the right upper lobe  bronchus and segmental branches of right upper lobe pulmonary artery which are occluded and invaded by tumor. No other central pulmonary embolism is identified. Multiple bilateral pulmonary nodules are identified which may represent metastasis. Further assessment by PET/CT scan and/or bronchoscopy is recommended.   Large pleural effusion and atelectasis in the right lower lobe.     CT abdomen/pelvis 02/19/2020:  Large pleural effusion in right lung base with atelectasis in the  right lower lobe and minimal pleural effusion in left base. Pneumonia is considered. Partially identified are multiple pulmonary nodules with  lymph nodes  in the cardiophrenic angle and upper abdomen concerning for  malignancy. Consider PET CT scan. Cystic lesions in liver and kidneys. There is no acute inflammation or bowel obstruction.    There is thickening of the GE junction.       He is s/p right thoracentesis on 2/20/2020, 100 mL of straw-colored/clear pleural fluid removed, cytology was negative for malignant hernia with gastroesophageal reflux disease and esophagitis    Subcutaneous cyst    Cardiomyopathy [facility]    LV dysfunction    Dyslipidemia    Non morbid obesity due to excess calories    Left inguinal hernia    GERD with esophagitis    S/P laparoscopic fundoplication    Mass of right lung    Pleural effusion    History of cancer tonsil    Hx of cardiac cath    Pneumonia    COPD exacerbation (HCC)    Adenocarcinoma, lung ([address]    Malignant neoplasm of overlapping sites of right bronchus and lung ([address]     Encounter for antineoplastic chemotherapy         Past Surgical History:      Procedure Laterality Date    ABDOMEN SURGERY      herni repair as child    BRONCHOSCOPY N/A 2/28/2020    BRONCHOSCOPY  EBUS , APC CRYO performed by Pao Chowdary MD at [address] [facility] [place], operated by [facility]  2/28/2020    BRONCHOSCOPY/TRANSBRONCHIAL NEEDLE BIOPSY performed by Pao Chowdary MD at [address] [facility] [place]  2/28/2020    BRONCHOSCOPY/TRANSBRONCHIAL NEEDLE BIOPSY ADDL LOBE performed by Pao Chowdary MD at [address] TEST  11/25/2012    abnormal stress test    COLONOSCOPY      CORONARY ANGIOPLASTY      stents x2 by Dr Yamilet Stanford to marginal and circumflex    CORONARY ANGIOPLASTY WITH STENT PLACEMENT  11/27/2012    primary stenting  with NAZANIN  to 1st OMB of Left CX and pimary NAZANIN to distal  Left CX    CYST REMOVAL  03/11/2015    FROM ABOVE RT EYE BROW    DENTAL SURGERY  04/04/2012    full mouth dental extractions    DIAGNOSTIC CARDIAC CATH LAB PROCEDURE      ECHO COMPL W DOP COLOR FLOW  11/26/2012         GASTRIC FUNDOPLICATION N/A 3/45/5164    LAPAROSCOPIC NISSEN FUNDOPLICATION performed by Ulices Walter MD at [address]  11/29/2017    Dr. Eden Marquez  7/23/15    WITH BIOPSY    UPPER GASTROINTESTINAL ENDOSCOPY N/A 3/16/2018    EGD BIOPSY performed by Ulices Walter MD at [facility] Pulse 95   Temp 98.4 °F (36.9 °C) (Temporal)   Resp 20   Ht 6' (1.829 m)   Wt 205 lb (93 kg)   BMI 27.80 kg/m²   GENERAL: Alert, oriented x 3, not in acute distress. HEENT: PERRLA; EOMI. Oropharynx clear. Scalp skin is dry. NECK: Supple. No palpable cervical or supraclavicular lymphadenopathy. LUNGS: Good air entry bilaterally. No wheezing, crackles or rhonchi. CARDIOVASCULAR: Regular rate. No murmurs, rubs or gallops. ABDOMEN: Soft. Non-tender, non-distended. Positive bowel sounds. EXTREMITIES: Without clubbing, cyanosis, or edema. NEUROLOGIC: No focal deficits. ECOG PS 1     Impression/Plan:      81 y.o. male with a past medical hx of MI, HTN, DDD, CAD, and tonsillar squamous cell carcinoma. He was treated for his tonsillar cacrcinoma with Cisplatin and 5-Fu and radiation. The patient had presented in February 2020 with cough shortness of breath and dizziness, he was diagnosed with non-small cell lung cancer, adenocarcinoma, he has a large infiltrative mass in the right hilum, bilateral pulmonary nodules, and lymphadenopathy in the chest and upper abdomen, also metastatic disease to the brain. He completed WB RT on 3/25/2020. The patient had a PET scan done for staging on 3/24/2020, the results documented were reviewed with the patient, he has complete atelectasis of the right lung with large right pleural effusion, hypermetabolic activity in the right hilar location surrounding the right mainstem bronchus, multiple areas of hypermetabolic radiotracer activity with the present pleura notably overlying right upper lobe laterally, anterior and medial right lower lobe as well as pleura overlying left lower lobe extending into the abdomen along the anuj of the diaphragm. Results/images were reviewed with the patient. I discussed with the patient his diagnosis, prognosis, and recommendation for treatment with systemic palliative therapy.   PDL 1 expression is 1%, he does not have the EGFR or the BRAF mutations, ALK and Ros 1 are pending, according to pathology we should have the results by Tuesday, if negative he will be started on systemic palliative therapy with carboplatin, Alimta and Keytruda next week. Large right sided pleural effusion with the patient was referred Dr. Clifton Logan for thoracentesis. Follow-up with palliative medicine for symptoms management. RTC next week. Thank you for allowing us to participate in the care of Mr. Jacquelyn Sellers.     Lynette Oneal MD   HEMATOLOGY/MEDICAL ONCOLOGY  [address] [place] [address] ONCOLOGY  [facility] [address] [facility] [address]  Dept: [phone]

## 2020-03-31 ENCOUNTER — TELEPHONE (OUTPATIENT)
Dept: PALLATIVE CARE | Age: 70
End: 2020-03-31

## 2020-03-31 NOTE — TELEPHONE ENCOUNTER
Palliative Medicine  Social Work Progress Note    Pt Name: Christiana Javed. SW referral received from [address] [place] [address], NP for community resources, support. Placed call to pt today and introduced SW. Pt shared that he does live alone and does need help at times. Pt shared that his brother and sister-in-law live in [place] and they have been offering him practical, emotional support. Pt has adult children living in the area but states \"they have their lives and jobs, they can't help. \"    Reviewed resources available to pt through [facility] waiver program (personal care, meals, etc). Pt is agreeable for this SW to make referral on his behalf. Referral placed today. Provided pt with SW contact information and encouraged him to call with any additional questions or barriers to care. Pt voiced understanding and thanked SW for the call.

## 2020-04-01 RX ORDER — ALLOPURINOL 100 MG/1
TABLET ORAL
Qty: 180 TABLET | Refills: 0 | Status: SHIPPED
Start: 2020-04-01 | End: 2020-06-25

## 2020-04-01 RX ORDER — CLONIDINE HYDROCHLORIDE 0.2 MG/1
TABLET ORAL
Qty: 180 TABLET | Refills: 0 | Status: SHIPPED
Start: 2020-04-01 | End: 2020-06-25

## 2020-04-01 RX ORDER — HYDROCHLOROTHIAZIDE 12.5 MG/1
TABLET ORAL
Qty: 90 TABLET | Refills: 0 | Status: SHIPPED
Start: 2020-04-01 | End: 2020-06-25

## 2020-04-01 RX ORDER — AMLODIPINE BESYLATE 10 MG/1
TABLET ORAL
Qty: 90 TABLET | Refills: 0 | Status: SHIPPED
Start: 2020-04-01 | End: 2020-06-25

## 2020-04-02 ENCOUNTER — HOSPITAL ENCOUNTER (OUTPATIENT)
Dept: INFUSION THERAPY | Age: 70
Discharge: HOME | End: 2020-04-02
Payer: MEDICARE

## 2020-04-02 DIAGNOSIS — C34.90 NON-SMALL CELL CARCINOMA OF LUNG (HCC): ICD-10-CM

## 2020-04-02 DIAGNOSIS — C34.81 MALIGNANT NEOPLASM OF OVERLAPPING SITES OF RIGHT BRONCHUS AND LUNG (HCC): ICD-10-CM

## 2020-04-02 DIAGNOSIS — C34.90 ADENOCARCINOMA OF LUNG, UNSPECIFIED LATERALITY (HCC): ICD-10-CM

## 2020-04-02 LAB
ALBUMIN SERPL-MCNC: 3.9 G/DL (ref 3.5–5.2)
ALP BLD-CCNC: 79 U/L (ref 40–129)
ALT SERPL-CCNC: 11 U/L (ref 0–40)
ANION GAP SERPL CALCULATED.3IONS-SCNC: 10 MMOL/L (ref 7–16)
AST SERPL-CCNC: 11 U/L (ref 0–39)
BASOPHILS ABSOLUTE: 0.02 E9/L (ref 0–0.2)
BASOPHILS RELATIVE PERCENT: 0.2 % (ref 0–2)
BILIRUB SERPL-MCNC: 0.3 MG/DL (ref 0–1.2)
BUN BLDV-MCNC: 33 MG/DL (ref 8–23)
CALCIUM SERPL-MCNC: 9.9 MG/DL (ref 8.6–10.2)
CHLORIDE BLD-SCNC: 107 MMOL/L (ref 98–107)
CO2: 28 MMOL/L (ref 22–29)
CREAT SERPL-MCNC: 1.2 MG/DL (ref 0.7–1.2)
EOSINOPHILS ABSOLUTE: 0.11 E9/L (ref 0.05–0.5)
EOSINOPHILS RELATIVE PERCENT: 1.2 % (ref 0–6)
GFR AFRICAN AMERICAN: >60
GFR NON-AFRICAN AMERICAN: >60 ML/MIN/1.73
GLUCOSE BLD-MCNC: 133 MG/DL (ref 74–99)
HCT VFR BLD CALC: 42.5 % (ref 37–54)
HEMOGLOBIN: 13.7 G/DL (ref 12.5–16.5)
IMMATURE GRANULOCYTES #: 0.02 E9/L
IMMATURE GRANULOCYTES %: 0.2 % (ref 0–5)
LYMPHOCYTES ABSOLUTE: 2.33 E9/L (ref 1.5–4)
LYMPHOCYTES RELATIVE PERCENT: 26.2 % (ref 20–42)
MAGNESIUM: 2.1 MG/DL (ref 1.6–2.6)
MCH RBC QN AUTO: 29.7 PG (ref 26–35)
MCHC RBC AUTO-ENTMCNC: 32.2 % (ref 32–34.5)
MCV RBC AUTO: 92 FL (ref 80–99.9)
MONOCYTES ABSOLUTE: 0.85 E9/L (ref 0.1–0.95)
MONOCYTES RELATIVE PERCENT: 9.6 % (ref 2–12)
NEUTROPHILS ABSOLUTE: 5.57 E9/L (ref 1.8–7.3)
NEUTROPHILS RELATIVE PERCENT: 62.6 % (ref 43–80)
PDW BLD-RTO: 14 FL (ref 11.5–15)
PLATELET # BLD: 275 E9/L (ref 130–450)
PMV BLD AUTO: 9.7 FL (ref 7–12)
POTASSIUM SERPL-SCNC: 3.3 MMOL/L (ref 3.5–5)
RBC # BLD: 4.62 E12/L (ref 3.8–5.8)
SODIUM BLD-SCNC: 145 MMOL/L (ref 132–146)
TOTAL PROTEIN: 6.5 G/DL (ref 6.4–8.3)
WBC # BLD: 8.9 E9/L (ref 4.5–11.5)

## 2020-04-02 PROCEDURE — 83735 ASSAY OF MAGNESIUM: CPT

## 2020-04-02 PROCEDURE — 36415 COLL VENOUS BLD VENIPUNCTURE: CPT

## 2020-04-02 PROCEDURE — 85025 COMPLETE CBC W/AUTO DIFF WBC: CPT

## 2020-04-02 PROCEDURE — 80053 COMPREHEN METABOLIC PANEL: CPT

## 2020-04-03 ENCOUNTER — TELEPHONE (OUTPATIENT)
Dept: ONCOLOGY | Age: 70
End: 2020-04-03

## 2020-04-03 ENCOUNTER — TELEPHONE (OUTPATIENT)
Dept: PULMONOLOGY | Age: 70
End: 2020-04-03

## 2020-04-03 ENCOUNTER — OFFICE VISIT (OUTPATIENT)
Dept: ONCOLOGY | Age: 70
End: 2020-04-03
Payer: MEDICARE

## 2020-04-03 ENCOUNTER — TELEPHONE (OUTPATIENT)
Dept: INFUSION THERAPY | Age: 70
End: 2020-04-03

## 2020-04-03 ENCOUNTER — HOSPITAL ENCOUNTER (OUTPATIENT)
Dept: INFUSION THERAPY | Age: 70
Discharge: HOME | End: 2020-04-03
Payer: MEDICARE

## 2020-04-03 VITALS
HEIGHT: 72 IN | WEIGHT: 202.2 LBS | SYSTOLIC BLOOD PRESSURE: 171 MMHG | TEMPERATURE: 99.3 F | OXYGEN SATURATION: 93 % | HEART RATE: 93 BPM | BODY MASS INDEX: 27.39 KG/M2 | DIASTOLIC BLOOD PRESSURE: 81 MMHG

## 2020-04-03 VITALS
TEMPERATURE: 98.6 F | OXYGEN SATURATION: 93 % | RESPIRATION RATE: 18 BRPM | HEART RATE: 76 BPM | DIASTOLIC BLOOD PRESSURE: 86 MMHG | SYSTOLIC BLOOD PRESSURE: 139 MMHG

## 2020-04-03 DIAGNOSIS — Z51.11 ENCOUNTER FOR ANTINEOPLASTIC CHEMOTHERAPY: ICD-10-CM

## 2020-04-03 DIAGNOSIS — C34.90 ADENOCARCINOMA OF LUNG, UNSPECIFIED LATERALITY (HCC): Primary | ICD-10-CM

## 2020-04-03 DIAGNOSIS — C34.81 MALIGNANT NEOPLASM OF OVERLAPPING SITES OF RIGHT BRONCHUS AND LUNG (HCC): ICD-10-CM

## 2020-04-03 PROCEDURE — 6370000000 HC RX 637 (ALT 250 FOR IP): Performed by: NURSE PRACTITIONER

## 2020-04-03 PROCEDURE — 96377 APPLICATON ON-BODY INJECTOR: CPT

## 2020-04-03 PROCEDURE — 96413 CHEMO IV INFUSION 1 HR: CPT

## 2020-04-03 PROCEDURE — 2580000003 HC RX 258: Performed by: INTERNAL MEDICINE

## 2020-04-03 PROCEDURE — 99214 OFFICE O/P EST MOD 30 MIN: CPT | Performed by: INTERNAL MEDICINE

## 2020-04-03 PROCEDURE — 96366 THER/PROPH/DIAG IV INF ADDON: CPT

## 2020-04-03 PROCEDURE — 96361 HYDRATE IV INFUSION ADD-ON: CPT

## 2020-04-03 PROCEDURE — 2580000003 HC RX 258: Performed by: NURSE PRACTITIONER

## 2020-04-03 PROCEDURE — 96375 TX/PRO/DX INJ NEW DRUG ADDON: CPT

## 2020-04-03 PROCEDURE — 4040F PNEUMOC VAC/ADMIN/RCVD: CPT | Performed by: INTERNAL MEDICINE

## 2020-04-03 PROCEDURE — 96417 CHEMO IV INFUS EACH ADDL SEQ: CPT

## 2020-04-03 PROCEDURE — 3017F COLORECTAL CA SCREEN DOC REV: CPT | Performed by: INTERNAL MEDICINE

## 2020-04-03 PROCEDURE — 1036F TOBACCO NON-USER: CPT | Performed by: INTERNAL MEDICINE

## 2020-04-03 PROCEDURE — 96411 CHEMO IV PUSH ADDL DRUG: CPT

## 2020-04-03 PROCEDURE — 6360000002 HC RX W HCPCS: Performed by: INTERNAL MEDICINE

## 2020-04-03 PROCEDURE — 1123F ACP DISCUSS/DSCN MKR DOCD: CPT | Performed by: INTERNAL MEDICINE

## 2020-04-03 PROCEDURE — G8417 CALC BMI ABV UP PARAM F/U: HCPCS | Performed by: INTERNAL MEDICINE

## 2020-04-03 PROCEDURE — 96372 THER/PROPH/DIAG INJ SC/IM: CPT

## 2020-04-03 PROCEDURE — G8427 DOCREV CUR MEDS BY ELIG CLIN: HCPCS | Performed by: INTERNAL MEDICINE

## 2020-04-03 PROCEDURE — 6360000002 HC RX W HCPCS: Performed by: NURSE PRACTITIONER

## 2020-04-03 RX ORDER — PROCHLORPERAZINE MALEATE 10 MG
10 TABLET ORAL EVERY 6 HOURS PRN
Qty: 120 TABLET | Refills: 1 | Status: SHIPPED | OUTPATIENT
Start: 2020-04-03

## 2020-04-03 RX ORDER — SODIUM CHLORIDE 9 MG/ML
INJECTION, SOLUTION INTRAVENOUS CONTINUOUS
Status: CANCELLED | OUTPATIENT
Start: 2020-04-03

## 2020-04-03 RX ORDER — SODIUM CHLORIDE 9 MG/ML
1000 INJECTION, SOLUTION INTRAVENOUS ONCE
Status: COMPLETED | OUTPATIENT
Start: 2020-04-03 | End: 2020-04-03

## 2020-04-03 RX ORDER — DIPHENHYDRAMINE HYDROCHLORIDE 50 MG/ML
50 INJECTION INTRAMUSCULAR; INTRAVENOUS ONCE
Status: CANCELLED | OUTPATIENT
Start: 2020-04-03

## 2020-04-03 RX ORDER — CYANOCOBALAMIN 1000 UG/ML
1000 INJECTION INTRAMUSCULAR; SUBCUTANEOUS ONCE
Status: COMPLETED | OUTPATIENT
Start: 2020-04-03 | End: 2020-04-03

## 2020-04-03 RX ORDER — METHYLPREDNISOLONE SODIUM SUCCINATE 125 MG/2ML
125 INJECTION, POWDER, LYOPHILIZED, FOR SOLUTION INTRAMUSCULAR; INTRAVENOUS ONCE
Status: CANCELLED | OUTPATIENT
Start: 2020-04-03

## 2020-04-03 RX ORDER — SODIUM CHLORIDE 0.9 % (FLUSH) 0.9 %
5 SYRINGE (ML) INJECTION PRN
Status: CANCELLED | OUTPATIENT
Start: 2020-04-03

## 2020-04-03 RX ORDER — HEPARIN SODIUM (PORCINE) LOCK FLUSH IV SOLN 100 UNIT/ML 100 UNIT/ML
500 SOLUTION INTRAVENOUS PRN
Status: CANCELLED | OUTPATIENT
Start: 2020-04-03

## 2020-04-03 RX ORDER — MEPERIDINE HYDROCHLORIDE 25 MG/ML
12.5 INJECTION INTRAMUSCULAR; INTRAVENOUS; SUBCUTANEOUS ONCE
Status: CANCELLED | OUTPATIENT
Start: 2020-04-03

## 2020-04-03 RX ORDER — DEXAMETHASONE SODIUM PHOSPHATE 10 MG/ML
10 INJECTION INTRAMUSCULAR; INTRAVENOUS ONCE
Status: COMPLETED | OUTPATIENT
Start: 2020-04-03 | End: 2020-04-03

## 2020-04-03 RX ORDER — POTASSIUM CHLORIDE 20 MEQ/1
40 TABLET, EXTENDED RELEASE ORAL ONCE
Status: COMPLETED | OUTPATIENT
Start: 2020-04-03 | End: 2020-04-03

## 2020-04-03 RX ORDER — SODIUM CHLORIDE 0.9 % (FLUSH) 0.9 %
10 SYRINGE (ML) INJECTION PRN
Status: DISCONTINUED | OUTPATIENT
Start: 2020-04-03 | End: 2020-04-04 | Stop reason: HOSPADM

## 2020-04-03 RX ORDER — DEXAMETHASONE 4 MG/1
4 TABLET ORAL 2 TIMES DAILY
Qty: 40 TABLET | Refills: 0 | Status: SHIPPED | OUTPATIENT
Start: 2020-04-03

## 2020-04-03 RX ORDER — EPINEPHRINE 1 MG/ML
0.3 INJECTION, SOLUTION, CONCENTRATE INTRAVENOUS PRN
Status: CANCELLED | OUTPATIENT
Start: 2020-04-03

## 2020-04-03 RX ORDER — FOLIC ACID 1 MG/1
1 TABLET ORAL DAILY
Qty: 30 TABLET | Refills: 5 | Status: SHIPPED
Start: 2020-04-03 | End: 2020-06-03

## 2020-04-03 RX ORDER — POTASSIUM CHLORIDE 20 MEQ/1
40 TABLET, EXTENDED RELEASE ORAL PRN
Status: CANCELLED
Start: 2020-04-03

## 2020-04-03 RX ORDER — PALONOSETRON HYDROCHLORIDE 0.05 MG/ML
0.25 INJECTION, SOLUTION INTRAVENOUS ONCE
Status: COMPLETED | OUTPATIENT
Start: 2020-04-03 | End: 2020-04-03

## 2020-04-03 RX ORDER — LORATADINE 10 MG/1
10 TABLET ORAL DAILY
Qty: 30 TABLET | Refills: 5 | Status: SHIPPED | OUTPATIENT
Start: 2020-04-03

## 2020-04-03 RX ORDER — ONDANSETRON 4 MG/1
4 TABLET, FILM COATED ORAL EVERY 8 HOURS PRN
Qty: 60 TABLET | Refills: 1 | Status: SHIPPED | OUTPATIENT
Start: 2020-04-03

## 2020-04-03 RX ADMIN — SODIUM CHLORIDE, PRESERVATIVE FREE 10 ML: 5 INJECTION INTRAVENOUS at 10:36

## 2020-04-03 RX ADMIN — POTASSIUM CHLORIDE 40 MEQ: 1500 TABLET, EXTENDED RELEASE ORAL at 10:42

## 2020-04-03 RX ADMIN — SODIUM CHLORIDE 1000 ML/HR: 9 INJECTION, SOLUTION INTRAVENOUS at 10:33

## 2020-04-03 RX ADMIN — SODIUM CHLORIDE 1000 MG: 9 INJECTION, SOLUTION INTRAVENOUS at 12:03

## 2020-04-03 RX ADMIN — SODIUM CHLORIDE 200 MG: 9 INJECTION, SOLUTION INTRAVENOUS at 11:17

## 2020-04-03 RX ADMIN — DEXAMETHASONE SODIUM PHOSPHATE 10 MG: 10 INJECTION INTRAMUSCULAR; INTRAVENOUS at 10:36

## 2020-04-03 RX ADMIN — SODIUM CHLORIDE, PRESERVATIVE FREE 10 ML: 5 INJECTION INTRAVENOUS at 10:33

## 2020-04-03 RX ADMIN — CARBOPLATIN 400 MG: 10 INJECTION, SOLUTION INTRAVENOUS at 12:22

## 2020-04-03 RX ADMIN — FOSAPREPITANT 150 MG: 150 INJECTION, POWDER, LYOPHILIZED, FOR SOLUTION INTRAVENOUS at 10:41

## 2020-04-03 RX ADMIN — PALONOSETRON 0.25 MG: 0.25 INJECTION, SOLUTION INTRAVENOUS at 10:33

## 2020-04-03 RX ADMIN — CYANOCOBALAMIN 1000 MCG: 1000 INJECTION, SOLUTION INTRAMUSCULAR; SUBCUTANEOUS at 10:45

## 2020-04-03 RX ADMIN — PEGFILGRASTIM 6 MG: KIT SUBCUTANEOUS at 12:58

## 2020-04-03 NOTE — PROGRESS NOTES
cells. He had on 2/28/2020 [facility]/EBUS with FNA of station L4, station 7, cytology was positive for malignant cells, adenocarcinoma. He had a brain MRI done on 2/21/2020 revealingLeptomeningeal enhancement with possible involvement of the underlying cortex involving the left frontal gyrus. Considering history of lung mass, this is suspicious for metastatic disease. Other infectious and  inflammatory etiologies are also considered with primary malignancy felt less likely. The patient completed WB RT on 3/25/2020. He had a PET scan done, he is feeling tired, and has shortness of breath. Review of Systems;    CONSTITUTIONAL: No fever, chills. Positive for decreased appetite . Fatigue. ENMT: Eyes: No diplopia; Nose: No epistaxis. Mouth: No sore throat. RESPIRATORY: No hemoptysis, positive shortness of breath and cough. CARDIOVASCULAR: No chest pain, palpitations. GASTROINTESTINAL: No nausea/vomiting, abdominal pain, diarrhea/constipation. GENITOURINARY: No dysuria, urinary frequency, hematuria. NEURO: No syncope, presyncope, he has mild headaches.   Remainder:  ROS NEGATIVE  Past Medical History:      Diagnosis Date    CAD (coronary artery disease)     DDD (degenerative disc disease) 2/1/2011    Hiatal hernia with gastroesophageal reflux disease and esophagitis     Hyperlipidemia     Hypertension     Inguinal hernia     left  S/P repair 11/29/17    Intervertebral disk disease     Lung cancer ([address]     MI (myocardial infarction) ([address]     Pneumonia 2/21/2020    Primary tonsillar squamous cell carcinoma ([address] 7/8/2013    radiation to area    Psychiatric problem     S/P cardiac cath 2/21/2020     Patient Active Problem List   Diagnosis    Hypertension    ED (erectile dysfunction)    CAD (coronary artery disease)    Percutaneous transluminal coronary angioplasty status    Osteoarthritis of lumbar spine    Tobacco abuse    Primary tonsillar squamous cell carcinoma ([address]    Hiatal

## 2020-04-03 NOTE — PROGRESS NOTES
Spoke with patient about chemotherapy (Keytruda,Alimta, Carboplatin and Neulasta). We went over the protocol to be used, what to expect as far as side effects, and when to call with problems. This was intended to reinforce the education done by Dr. Arielle Lui. Patient was given an [address] provided by [employer]. Patient was provided medication and dietary handouts to the patient to take home and told patient to call if there are any questions once they had a chance to absorb the information. Patient had the opportunity to ask questions with all questions being answered to their satisfaction. The patient expresses understanding and acceptance of instructions.     Electronically signed by ANJEL Zheng [facility] on 4/3/2020 at 10:38 AM

## 2020-04-03 NOTE — TELEPHONE ENCOUNTER
Received ALK results, awaiting on ROS. Dr. Susan Sandoval notified. Yolanda Leigh in pathology working on receiving results.

## 2020-04-03 NOTE — PROGRESS NOTES
Ordered dose Carboplatin rounded to closest dispensing unit. Dose ordered AUC 5, with current CrCl 77 ml/min. Dose 513 mg, rounded to nearest 50 mg/ml, rounded Dose 500 mg. Due to increased SCr 1.2 today, add 1 liter to treatment and decreased AUC to 4 (20% Dose Reduction) in Carboplatin. New dose is 400 mg for C1d1. Labs reviewed by Dr. Irasema Castillo     Patient presents to clinic today for chemotherapy treatment. Patient's labs monitored, specifically, potassium level, to ensure no increased risk of hypokalemia with administration of the medications for treatment. Patient's potassium level is 3.3. Patient received therapy replacement and will continue to be monitored prior to each chemotherapy treatment.     Electronically signed by Angel Henao, [facility] on 4/3/2020 at 10:36 AM

## 2020-04-07 ENCOUNTER — TELEPHONE (OUTPATIENT)
Dept: PULMONOLOGY | Age: 70
End: 2020-04-07

## 2020-04-07 LAB
AFB CULTURE (MYCOBACTERIA): NORMAL
AFB SMEAR: NORMAL

## 2020-04-08 ENCOUNTER — HOSPITAL ENCOUNTER (OUTPATIENT)
Age: 70
Discharge: HOME | End: 2020-04-08
Payer: MEDICARE

## 2020-04-08 LAB
ALBUMIN SERPL-MCNC: 4 G/DL (ref 3.5–5.2)
ALP BLD-CCNC: 102 U/L (ref 40–129)
ALT SERPL-CCNC: 11 U/L (ref 0–40)
ANION GAP SERPL CALCULATED.3IONS-SCNC: 13 MMOL/L (ref 7–16)
AST SERPL-CCNC: 18 U/L (ref 0–39)
BASOPHILS ABSOLUTE: 0.05 E9/L (ref 0–0.2)
BASOPHILS RELATIVE PERCENT: 0.8 % (ref 0–2)
BILIRUB SERPL-MCNC: 0.6 MG/DL (ref 0–1.2)
BUN BLDV-MCNC: 37 MG/DL (ref 8–23)
CALCIUM SERPL-MCNC: 9.6 MG/DL (ref 8.6–10.2)
CHLORIDE BLD-SCNC: 106 MMOL/L (ref 98–107)
CO2: 29 MMOL/L (ref 22–29)
CREAT SERPL-MCNC: 1 MG/DL (ref 0.7–1.2)
EOSINOPHILS ABSOLUTE: 0.11 E9/L (ref 0.05–0.5)
EOSINOPHILS RELATIVE PERCENT: 1.7 % (ref 0–6)
GFR AFRICAN AMERICAN: >60
GFR NON-AFRICAN AMERICAN: >60 ML/MIN/1.73
GLUCOSE BLD-MCNC: 99 MG/DL (ref 74–99)
HCT VFR BLD CALC: 43.6 % (ref 37–54)
HEMOGLOBIN: 13.8 G/DL (ref 12.5–16.5)
INR BLD: 1.3
LYMPHOCYTES ABSOLUTE: 1.54 E9/L (ref 1.5–4)
LYMPHOCYTES RELATIVE PERCENT: 24.2 % (ref 20–42)
MAGNESIUM: 2.2 MG/DL (ref 1.6–2.6)
MCH RBC QN AUTO: 29.2 PG (ref 26–35)
MCHC RBC AUTO-ENTMCNC: 31.7 % (ref 32–34.5)
MCV RBC AUTO: 92.4 FL (ref 80–99.9)
MONOCYTES ABSOLUTE: 0.19 E9/L (ref 0.1–0.95)
MONOCYTES RELATIVE PERCENT: 3.3 % (ref 2–12)
NEUTROPHILS ABSOLUTE: 4.48 E9/L (ref 1.8–7.3)
NEUTROPHILS RELATIVE PERCENT: 70 % (ref 43–80)
PDW BLD-RTO: 14.3 FL (ref 11.5–15)
PLATELET # BLD: 134 E9/L (ref 130–450)
PLATELET # BLD: 138 E9/L (ref 130–450)
PMV BLD AUTO: 11.2 FL (ref 7–12)
POTASSIUM SERPL-SCNC: 3.2 MMOL/L (ref 3.5–5)
PROTHROMBIN TIME: 15.2 SEC (ref 9.3–12.4)
RBC # BLD: 4.72 E12/L (ref 3.8–5.8)
SODIUM BLD-SCNC: 148 MMOL/L (ref 132–146)
TOTAL PROTEIN: 7.1 G/DL (ref 6.4–8.3)
WBC # BLD: 6.4 E9/L (ref 4.5–11.5)

## 2020-04-08 PROCEDURE — 85049 AUTOMATED PLATELET COUNT: CPT

## 2020-04-08 PROCEDURE — 36415 COLL VENOUS BLD VENIPUNCTURE: CPT

## 2020-04-08 PROCEDURE — 85025 COMPLETE CBC W/AUTO DIFF WBC: CPT

## 2020-04-08 PROCEDURE — 83735 ASSAY OF MAGNESIUM: CPT

## 2020-04-08 PROCEDURE — 80053 COMPREHEN METABOLIC PANEL: CPT

## 2020-04-08 PROCEDURE — 85610 PROTHROMBIN TIME: CPT

## 2020-04-10 ENCOUNTER — TELEPHONE (OUTPATIENT)
Dept: PALLATIVE CARE | Age: 70
End: 2020-04-10

## 2020-04-10 NOTE — TELEPHONE ENCOUNTER
VALDO received call from [place] [id number]  Ashley Benoit [phone] requesting a tub bench for patient. PAULINAW spoke with patient and [address] who has provided dme for pt. This is not covered under his current insurance, cost at [address] would be $88.00. Patient declines and will look to order one on line.

## 2020-04-14 ENCOUNTER — HOSPITAL ENCOUNTER (OUTPATIENT)
Dept: INFUSION THERAPY | Age: 70
End: 2020-04-14
Payer: MEDICARE

## 2020-04-15 ENCOUNTER — HOSPITAL ENCOUNTER (OUTPATIENT)
Dept: GENERAL RADIOLOGY | Age: 70
Discharge: HOME | End: 2020-04-17
Payer: MEDICARE

## 2020-04-15 ENCOUNTER — HOSPITAL ENCOUNTER (OUTPATIENT)
Dept: INTERVENTIONAL RADIOLOGY/VASCULAR | Age: 70
Discharge: HOME | End: 2020-04-17
Payer: MEDICARE

## 2020-04-15 VITALS
HEIGHT: 72 IN | TEMPERATURE: 98.4 F | WEIGHT: 200 LBS | DIASTOLIC BLOOD PRESSURE: 76 MMHG | BODY MASS INDEX: 27.09 KG/M2 | RESPIRATION RATE: 18 BRPM | HEART RATE: 98 BPM | SYSTOLIC BLOOD PRESSURE: 135 MMHG | OXYGEN SATURATION: 91 %

## 2020-04-15 PROCEDURE — 7100000011 HC PHASE II RECOVERY - ADDTL 15 MIN

## 2020-04-15 PROCEDURE — 2500000003 HC RX 250 WO HCPCS: Performed by: PHYSICIAN ASSISTANT

## 2020-04-15 PROCEDURE — 32555 ASPIRATE PLEURA W/ IMAGING: CPT

## 2020-04-15 PROCEDURE — 71045 X-RAY EXAM CHEST 1 VIEW: CPT

## 2020-04-15 PROCEDURE — C1729 CATH, DRAINAGE: HCPCS

## 2020-04-15 PROCEDURE — 7100000010 HC PHASE II RECOVERY - FIRST 15 MIN

## 2020-04-15 RX ORDER — LIDOCAINE HYDROCHLORIDE 20 MG/ML
INJECTION, SOLUTION INFILTRATION; PERINEURAL
Status: COMPLETED | OUTPATIENT
Start: 2020-04-15 | End: 2020-04-15

## 2020-04-15 RX ADMIN — LIDOCAINE HYDROCHLORIDE 10 ML: 20 INJECTION, SOLUTION INFILTRATION; PERINEURAL at 09:20

## 2020-04-15 ASSESSMENT — PAIN - FUNCTIONAL ASSESSMENT: PAIN_FUNCTIONAL_ASSESSMENT: 0-10

## 2020-04-15 NOTE — PROGRESS NOTES
IRFLOWSHEET    Date: 4/15/2020    Time: 8:27 AM     Exam: image guided right thoracentesis    Radiologist Performing Procedure: Kristen Lantigua    Nurse Reporting/Phone: outpatient      Nurse Receiving: Manual Deacon    Permit signed:      Yes    ID Band Checklist: Yes    Allergies: Patient has no known allergies. TIME OUT: 7909    PROCEDURE START TIME: 9018    Puncture Site: r back     Puncture Time: 4914    Catheters:6 fr pigtail drain. LABS:   Lab Results   Component Value Date    INR 1.3 04/08/2020    PROTIME 15.2 (H) 04/08/2020           Lab Results   Component Value Date    CREATININE 1.0 04/08/2020    BUN 37 (H) 04/08/2020          Lab Results   Component Value Date    HGB 13.8 04/08/2020    HCT 43.6 04/08/2020     04/08/2020     04/08/2020         PROCEDURE END TIME: 0902    Total Contrast: 0    Fluoroscopy Time: 0    Complications:  None    Comments: 0902 catheter 6 fr pigtail with 2000 cc of  clear dark dariel fluid  Lido given 10 cc prior to start. Abdomen scanned, prepped and centesis catheter inserted right posterior pleural space with ultrasound guidance by Kenyetta Shabazz @0846. Patient tolerated well. 2  Liters drained of Clear dark Dariel- colored thoracentesis fluid. Centesis catheter removed @ 0902  . Puncture site cleansed and dry dressing applied. No bleeding, swelling or complications noted. Patient discharge instructions given and signed. understands instructions patient to front door lobby via wheelchair.

## 2020-04-15 NOTE — PROGRESS NOTES
Patient arrived via  with  w/c  to Radiology department for   thoracentesis . Allergies, home medications, H&P and fasting instructions reviewed with patient. Vital signs taken. I  Procedural instructions given, questions answered, understanding expressed and consent signed.

## 2020-04-15 NOTE — H&P
Take 1 tablet by mouth nightly, Disp: 90 tablet, Rfl: 1    losartan (COZAAR) 100 MG tablet, take 1 tablet by mouth IN THE MORNING, Disp: 90 tablet, Rfl: 1    memantine (NAMENDA) 10 MG tablet, Take 1 tablet by mouth 2 times daily, Disp: 60 tablet, Rfl: 5    tiotropium (SPIRIVA HANDIHALER) 18 MCG inhalation capsule, Inhale 18 mcg into the lungs daily, Disp: , Rfl:     albuterol sulfate HFA (PROVENTIL HFA) 108 (90 Base) MCG/ACT inhaler, Inhale 2 puffs into the lungs 4 times daily, Disp: 1 Inhaler, Rfl: 3    tiotropium (SPIRIVA HANDIHALER) 18 MCG inhalation capsule, Inhale 1 capsule into the lungs daily, Disp: 30 capsule, Rfl: 0    ipratropium-albuterol (DUONEB) 0.5-2.5 (3) MG/3ML SOLN nebulizer solution, Inhale 3 mLs into the lungs every 4 hours as needed for Shortness of Breath, Disp: 360 mL, Rfl: 5    oxyCODONE-acetaminophen (PERCOCET) 7.5-325 MG per tablet, take 1 tablet by mouth every 8 hours AS NEEDED FOR PAIN, Disp: , Rfl: 0    atorvastatin (LIPITOR) 10 MG tablet, take 1 tablet by mouth at bedtime, Disp: 90 tablet, Rfl: 3    pantoprazole (PROTONIX) 40 MG tablet, Take 1 tablet by mouth daily, Disp: 90 tablet, Rfl: 3    metoprolol succinate (TOPROL XL) 50 MG extended release tablet, Take 1 tablet by mouth every morning, Disp: 90 tablet, Rfl: 5    NARCAN 4 MG/0.1ML LIQD nasal spray, instill 1 spray in 1 NOSTRIL if needed for opioid overdose may re. ..  (REFER TO PRESCRIPTION NOTES). , Disp: , Rfl: 0    aspirin 81 MG EC tablet, Take 1 tablet by mouth daily, Disp: 90 tablet, Rfl: 3    folic acid (FOLVITE) 1 MG tablet, Take 1 tablet by mouth daily, Disp: 30 tablet, Rfl: 5    allopurinol (ZYLOPRIM) 100 MG tablet, take 1 tablet by mouth twice a day, Disp: 180 tablet, Rfl: 0    clotrimazole (MYCELEX) 10 MG daryl, Take 10 mg by mouth 5 times daily, Disp: , Rfl:     nicotine (NICODERM CQ) 14 MG/24HR, Place 1 patch onto the skin daily, Disp: 30 patch, Rfl: 3    No Known Allergies    Past Medical History:

## 2020-04-16 RX ORDER — SODIUM CHLORIDE 0.9 % (FLUSH) 0.9 %
10 SYRINGE (ML) INJECTION PRN
Status: CANCELLED | OUTPATIENT
Start: 2020-04-16

## 2020-04-17 ENCOUNTER — OFFICE VISIT (OUTPATIENT)
Dept: ONCOLOGY | Age: 70
End: 2020-04-17
Payer: MEDICARE

## 2020-04-17 ENCOUNTER — HOSPITAL ENCOUNTER (OUTPATIENT)
Dept: INFUSION THERAPY | Age: 70
Discharge: HOME | End: 2020-04-17
Payer: MEDICARE

## 2020-04-17 ENCOUNTER — TELEPHONE (OUTPATIENT)
Dept: ONCOLOGY | Age: 70
End: 2020-04-17

## 2020-04-17 VITALS
TEMPERATURE: 97.6 F | SYSTOLIC BLOOD PRESSURE: 143 MMHG | HEART RATE: 82 BPM | DIASTOLIC BLOOD PRESSURE: 74 MMHG | RESPIRATION RATE: 18 BRPM

## 2020-04-17 VITALS
WEIGHT: 187.9 LBS | OXYGEN SATURATION: 95 % | HEART RATE: 91 BPM | SYSTOLIC BLOOD PRESSURE: 109 MMHG | BODY MASS INDEX: 25.45 KG/M2 | TEMPERATURE: 98.6 F | HEIGHT: 72 IN | DIASTOLIC BLOOD PRESSURE: 71 MMHG

## 2020-04-17 LAB
ALBUMIN SERPL-MCNC: 3.2 G/DL (ref 3.5–5.2)
ALP BLD-CCNC: 98 U/L (ref 40–129)
ALT SERPL-CCNC: 13 U/L (ref 0–40)
ANION GAP SERPL CALCULATED.3IONS-SCNC: 11 MMOL/L (ref 7–16)
AST SERPL-CCNC: 18 U/L (ref 0–39)
BASOPHILS ABSOLUTE: 0.03 E9/L (ref 0–0.2)
BASOPHILS RELATIVE PERCENT: 0.4 % (ref 0–2)
BILIRUB SERPL-MCNC: 0.2 MG/DL (ref 0–1.2)
BUN BLDV-MCNC: 27 MG/DL (ref 8–23)
CALCIUM SERPL-MCNC: 9.4 MG/DL (ref 8.6–10.2)
CHLORIDE BLD-SCNC: 106 MMOL/L (ref 98–107)
CO2: 30 MMOL/L (ref 22–29)
CREAT SERPL-MCNC: 1.2 MG/DL (ref 0.7–1.2)
EOSINOPHILS ABSOLUTE: 0.06 E9/L (ref 0.05–0.5)
EOSINOPHILS RELATIVE PERCENT: 0.7 % (ref 0–6)
GFR AFRICAN AMERICAN: >60
GFR NON-AFRICAN AMERICAN: >60 ML/MIN/1.73
GLUCOSE BLD-MCNC: 94 MG/DL (ref 74–99)
HCT VFR BLD CALC: 37.3 % (ref 37–54)
HEMOGLOBIN: 12 G/DL (ref 12.5–16.5)
IMMATURE GRANULOCYTES #: 0.12 E9/L
IMMATURE GRANULOCYTES %: 1.4 % (ref 0–5)
LYMPHOCYTES ABSOLUTE: 1.83 E9/L (ref 1.5–4)
LYMPHOCYTES RELATIVE PERCENT: 21.5 % (ref 20–42)
MAGNESIUM: 1.9 MG/DL (ref 1.6–2.6)
MCH RBC QN AUTO: 29.2 PG (ref 26–35)
MCHC RBC AUTO-ENTMCNC: 32.2 % (ref 32–34.5)
MCV RBC AUTO: 90.8 FL (ref 80–99.9)
MONOCYTES ABSOLUTE: 0.43 E9/L (ref 0.1–0.95)
MONOCYTES RELATIVE PERCENT: 5 % (ref 2–12)
NEUTROPHILS ABSOLUTE: 6.05 E9/L (ref 1.8–7.3)
NEUTROPHILS RELATIVE PERCENT: 71 % (ref 43–80)
PDW BLD-RTO: 13.3 FL (ref 11.5–15)
PLATELET # BLD: 117 E9/L (ref 130–450)
PMV BLD AUTO: 10.3 FL (ref 7–12)
POTASSIUM SERPL-SCNC: 3 MMOL/L (ref 3.5–5)
RBC # BLD: 4.11 E12/L (ref 3.8–5.8)
SODIUM BLD-SCNC: 147 MMOL/L (ref 132–146)
TOTAL PROTEIN: 6.1 G/DL (ref 6.4–8.3)
WBC # BLD: 8.5 E9/L (ref 4.5–11.5)

## 2020-04-17 PROCEDURE — 3017F COLORECTAL CA SCREEN DOC REV: CPT | Performed by: INTERNAL MEDICINE

## 2020-04-17 PROCEDURE — 99214 OFFICE O/P EST MOD 30 MIN: CPT | Performed by: INTERNAL MEDICINE

## 2020-04-17 PROCEDURE — 96360 HYDRATION IV INFUSION INIT: CPT

## 2020-04-17 PROCEDURE — 1123F ACP DISCUSS/DSCN MKR DOCD: CPT | Performed by: INTERNAL MEDICINE

## 2020-04-17 PROCEDURE — 80053 COMPREHEN METABOLIC PANEL: CPT

## 2020-04-17 PROCEDURE — 4040F PNEUMOC VAC/ADMIN/RCVD: CPT | Performed by: INTERNAL MEDICINE

## 2020-04-17 PROCEDURE — 6370000000 HC RX 637 (ALT 250 FOR IP): Performed by: INTERNAL MEDICINE

## 2020-04-17 PROCEDURE — 2580000003 HC RX 258: Performed by: NURSE PRACTITIONER

## 2020-04-17 PROCEDURE — 83735 ASSAY OF MAGNESIUM: CPT

## 2020-04-17 PROCEDURE — 1036F TOBACCO NON-USER: CPT | Performed by: INTERNAL MEDICINE

## 2020-04-17 PROCEDURE — 85025 COMPLETE CBC W/AUTO DIFF WBC: CPT

## 2020-04-17 PROCEDURE — G8427 DOCREV CUR MEDS BY ELIG CLIN: HCPCS | Performed by: INTERNAL MEDICINE

## 2020-04-17 PROCEDURE — G8417 CALC BMI ABV UP PARAM F/U: HCPCS | Performed by: INTERNAL MEDICINE

## 2020-04-17 RX ORDER — HEPARIN SODIUM (PORCINE) LOCK FLUSH IV SOLN 100 UNIT/ML 100 UNIT/ML
500 SOLUTION INTRAVENOUS PRN
Status: CANCELLED | OUTPATIENT
Start: 2020-04-17

## 2020-04-17 RX ORDER — POTASSIUM CHLORIDE 20 MEQ/1
40 TABLET, EXTENDED RELEASE ORAL PRN
Status: DISCONTINUED | OUTPATIENT
Start: 2020-04-17 | End: 2020-04-18 | Stop reason: HOSPADM

## 2020-04-17 RX ORDER — SODIUM CHLORIDE 0.9 % (FLUSH) 0.9 %
10 SYRINGE (ML) INJECTION PRN
Status: DISCONTINUED | OUTPATIENT
Start: 2020-04-17 | End: 2020-04-18 | Stop reason: HOSPADM

## 2020-04-17 RX ORDER — 0.9 % SODIUM CHLORIDE 0.9 %
1000 INTRAVENOUS SOLUTION INTRAVENOUS ONCE
Status: CANCELLED | OUTPATIENT
Start: 2020-04-17

## 2020-04-17 RX ORDER — 0.9 % SODIUM CHLORIDE 0.9 %
1000 INTRAVENOUS SOLUTION INTRAVENOUS ONCE
Status: COMPLETED | OUTPATIENT
Start: 2020-04-17 | End: 2020-04-17

## 2020-04-17 RX ORDER — POTASSIUM CHLORIDE 20 MEQ/1
40 TABLET, EXTENDED RELEASE ORAL PRN
Status: CANCELLED
Start: 2020-04-17

## 2020-04-17 RX ORDER — SODIUM CHLORIDE 0.9 % (FLUSH) 0.9 %
10 SYRINGE (ML) INJECTION PRN
Status: CANCELLED | OUTPATIENT
Start: 2020-04-17

## 2020-04-17 RX ADMIN — SODIUM CHLORIDE, PRESERVATIVE FREE 10 ML: 5 INJECTION INTRAVENOUS at 11:31

## 2020-04-17 RX ADMIN — SODIUM CHLORIDE 1000 ML: 9 INJECTION, SOLUTION INTRAVENOUS at 11:33

## 2020-04-17 RX ADMIN — POTASSIUM CHLORIDE 40 MEQ: 1500 TABLET, EXTENDED RELEASE ORAL at 11:31

## 2020-04-17 NOTE — PROGRESS NOTES
[address] MED ONCOLOGY  [address] [facility] [address]  Dept: [phone]  Attending Consult Note      Reason for Visit:   Metastatic non-small cell lung cancer, metastatic to the brain. Referring Physician:  Aaron Espinoza MD    PCP:  Lorie Nevarez MD    History of Present Illness:    81 y.o. male with a past medical hx of MI, HTN, DDD, CAD, and tonsillar squamous cell carcinoma. He was treated for his tonsillar cacrcinoma by Dr. Carlos Ruffin at the [facility] in 2009 with Cisplatin and 5-Fu and radiation.      Hx smoking 0.52 packs of cigarettes per day for 31 years.      Admitted to the ED on 02/19/2020 with lung mass after complaints of cough, dizziness, and SOB beginning 2 months ago.      CTA chest 02/19/2020:  Large infiltrative mass in the right hilum extending to the right apex  concerning for malignancy with narrowing of the right upper lobe  bronchus and segmental branches of right upper lobe pulmonary artery which are occluded and invaded by tumor. No other central pulmonary embolism is identified. Multiple bilateral pulmonary nodules are identified which may represent metastasis. Further assessment by PET/CT scan and/or bronchoscopy is recommended.   Large pleural effusion and atelectasis in the right lower lobe.     CT abdomen/pelvis 02/19/2020:  Large pleural effusion in right lung base with atelectasis in the  right lower lobe and minimal pleural effusion in left base. Pneumonia is considered. Partially identified are multiple pulmonary nodules with  lymph nodes  in the cardiophrenic angle and upper abdomen concerning for  malignancy. Consider PET CT scan. Cystic lesions in liver and kidneys. There is no acute inflammation or bowel obstruction.    There is thickening of the GE junction.       He is s/p right thoracentesis on 2/20/2020, 100 mL of straw-colored/clear pleural fluid removed, cytology was negative for malignant cells. He had on 2/28/2020 [facility]/EBUS with FNA of station L4, station 7, cytology was positive for malignant cells, adenocarcinoma. He had a brain MRI done on 2/21/2020 revealingLeptomeningeal enhancement with possible involvement of the underlying cortex involving the left frontal gyrus. Considering history of lung mass, this is suspicious for metastatic disease. Other infectious and  inflammatory etiologies are also considered with primary malignancy felt less likely. The patient completed WB RT on 3/25/2020. He was started on systemic palliative therapy with carboplatin Alimta and Keytruda on 4/3/2020. He had a right thoracentesis done on 4/15/2020, 2200 cc of dark dariel-colored fluid was removed, no cytology was sent. The shortness of breath had improved, denies any nausea vomiting or diarrhea, no mouth sores. Review of Systems;    CONSTITUTIONAL: No fever, chills. Positive for decreased appetite . Fatigue. ENMT: Eyes: No diplopia; Nose: No epistaxis. Mouth: No sore throat. RESPIRATORY: No hemoptysis, positive for improved shortness of breath and cough. CARDIOVASCULAR: No chest pain, palpitations. GASTROINTESTINAL: No nausea/vomiting, abdominal pain, diarrhea/constipation. GENITOURINARY: No dysuria, urinary frequency, hematuria. NEURO: No syncope, presyncope, he has mild headaches.   Remainder:  ROS NEGATIVE  Past Medical History:      Diagnosis Date    CAD (coronary artery disease)     DDD (degenerative disc disease) 2/1/2011    Hiatal hernia with gastroesophageal reflux disease and esophagitis     Hyperlipidemia     Hypertension     Inguinal hernia     left  S/P repair 11/29/17    Intervertebral disk disease     Lung cancer ([address]     MI (myocardial infarction) ([address]     Pneumonia 2/21/2020    Primary tonsillar squamous cell carcinoma ([address] 7/8/2013    radiation to area    Psychiatric problem     S/P cardiac cath 2/21/2020     Patient Active Problem List   Diagnosis  Hypertension    ED (erectile dysfunction)    CAD (coronary artery disease)    Percutaneous transluminal coronary angioplasty status    Osteoarthritis of lumbar spine    Tobacco abuse    Primary tonsillar squamous cell carcinoma (HCC)    Hiatal hernia with gastroesophageal reflux disease and esophagitis    Subcutaneous cyst    Cardiomyopathy ([address]    LV dysfunction    Dyslipidemia    Non morbid obesity due to excess calories    Left inguinal hernia    GERD with esophagitis    S/P laparoscopic fundoplication    Mass of right lung    Pleural effusion    History of cancer tonsil    Hx of cardiac cath    Pneumonia    COPD exacerbation (HCC)    Adenocarcinoma, lung ([address]    Malignant neoplasm of overlapping sites of right bronchus and lung ([address]     Encounter for antineoplastic chemotherapy         Past Surgical History:      Procedure Laterality Date    ABDOMEN SURGERY      herni repair as child    BRONCHOSCOPY N/A 2/28/2020    BRONCHOSCOPY  EBUS , APC CRYO performed by Howard Moffett MD at [address] [facility]  2/28/2020    BRONCHOSCOPY/TRANSBRONCHIAL NEEDLE BIOPSY performed by Howard Moffett MD at [id number] [place] [address]  2/28/2020    BRONCHOSCOPY/TRANSBRONCHIAL NEEDLE BIOPSY ADDL LOBE performed by Howard Moffett MD at [address] TEST  11/25/2012    abnormal stress test    COLONOSCOPY      CORONARY ANGIOPLASTY      stents x2 by Dr Luis Manuel Lira to marginal and circumflex    CORONARY ANGIOPLASTY WITH STENT PLACEMENT  11/27/2012    primary stenting  with NAZANIN  to 1st OMB of Left CX and pimary NAZANIN to distal  Left CX    CYST REMOVAL  03/11/2015    FROM ABOVE RT EYE BROW    DENTAL SURGERY  04/04/2012    full mouth dental extractions    DIAGNOSTIC CARDIAC CATH LAB PROCEDURE      ECHO COMPL W DOP COLOR FLOW  11/26/2012         GASTRIC FUNDOPLICATION N/A 6/29/2566    LAPAROSCOPIC NISSEN FUNDOPLICATION performed by Gerald Patricio MD at [address] [place] [address] abused: Not on file     Forced sexual activity: Not on file   Other Topics Concern    Not on file   Social History Narrative    Drinks 1-2 cups of coffee daily. Chronic pain. Allergies:  No Known Allergies    Physical Exam:  /71   Pulse 91   Temp 98.6 °F (37 °C)   Ht 6' (1.829 m)   Wt 187 lb 14.4 oz (85.2 kg)   SpO2 95%   BMI 25.48 kg/m²   GENERAL: Alert, oriented x 3, not in acute distress. HEENT: PERRLA; EOMI. Oropharynx clear. Scalp skin is dry. NECK: Supple. No palpable cervical or supraclavicular lymphadenopathy. LUNGS: Decreased air entry on the right side. CARDIOVASCULAR: Regular rate. No murmurs, rubs or gallops. ABDOMEN: Soft. Non-tender, non-distended. Positive bowel sounds. EXTREMITIES: Without clubbing, cyanosis, or edema. NEUROLOGIC: No focal deficits. ECOG PS 1     Impression/Plan:      81 y.o. male with a past medical hx of MI, HTN, DDD, CAD, and tonsillar squamous cell carcinoma. He was treated for his tonsillar cacrcinoma with Cisplatin and 5-Fu and radiation. The patient had presented in February 2020 with cough shortness of breath and dizziness, he was diagnosed with non-small cell lung cancer, adenocarcinoma, he has a large infiltrative mass in the right hilum, bilateral pulmonary nodules, and lymphadenopathy in the chest and upper abdomen, also metastatic disease to the brain. He completed WB RT on 3/25/2020. The patient had a PET scan done for staging on 3/24/2020, the results documented were reviewed with the patient, he has complete atelectasis of the right lung with large right pleural effusion, hypermetabolic activity in the right hilar location surrounding the right mainstem bronchus, multiple areas of hypermetabolic radiotracer activity with the present pleura notably overlying right upper lobe laterally, anterior and medial right lower lobe as well as pleura overlying left lower lobe extending into the abdomen along the anuj of the diaphragm.

## 2020-04-21 ENCOUNTER — TELEPHONE (OUTPATIENT)
Dept: PULMONOLOGY | Age: 70
End: 2020-04-21

## 2020-04-22 RX ORDER — MORPHINE SULFATE 15 MG/1
1 TABLET, FILM COATED, EXTENDED RELEASE ORAL EVERY 12 HOURS
COMMUNITY
Start: 2020-04-10

## 2020-04-23 ENCOUNTER — HOSPITAL ENCOUNTER (OUTPATIENT)
Dept: INFUSION THERAPY | Age: 70
Discharge: HOME | End: 2020-04-23
Payer: MEDICARE

## 2020-04-23 DIAGNOSIS — C34.81 MALIGNANT NEOPLASM OF OVERLAPPING SITES OF RIGHT BRONCHUS AND LUNG (HCC): ICD-10-CM

## 2020-04-23 DIAGNOSIS — C34.90 ADENOCARCINOMA OF LUNG, UNSPECIFIED LATERALITY (HCC): ICD-10-CM

## 2020-04-23 DIAGNOSIS — C34.90 NON-SMALL CELL CARCINOMA OF LUNG (HCC): ICD-10-CM

## 2020-04-23 LAB
ALBUMIN SERPL-MCNC: 3.1 G/DL (ref 3.5–5.2)
ALP BLD-CCNC: 100 U/L (ref 40–129)
ALT SERPL-CCNC: 10 U/L (ref 0–40)
ANION GAP SERPL CALCULATED.3IONS-SCNC: 10 MMOL/L (ref 7–16)
AST SERPL-CCNC: 20 U/L (ref 0–39)
BASOPHILS ABSOLUTE: 0.02 E9/L (ref 0–0.2)
BASOPHILS RELATIVE PERCENT: 0.2 % (ref 0–2)
BILIRUB SERPL-MCNC: 0.2 MG/DL (ref 0–1.2)
BUN BLDV-MCNC: 17 MG/DL (ref 8–23)
CALCIUM SERPL-MCNC: 9.7 MG/DL (ref 8.6–10.2)
CHLORIDE BLD-SCNC: 104 MMOL/L (ref 98–107)
CO2: 29 MMOL/L (ref 22–29)
CREAT SERPL-MCNC: 1 MG/DL (ref 0.7–1.2)
EOSINOPHILS ABSOLUTE: 0.02 E9/L (ref 0.05–0.5)
EOSINOPHILS RELATIVE PERCENT: 0.2 % (ref 0–6)
GFR AFRICAN AMERICAN: >60
GFR NON-AFRICAN AMERICAN: >60 ML/MIN/1.73
GLUCOSE BLD-MCNC: 135 MG/DL (ref 74–99)
HCT VFR BLD CALC: 35.3 % (ref 37–54)
HEMOGLOBIN: 11.3 G/DL (ref 12.5–16.5)
IMMATURE GRANULOCYTES #: 0.06 E9/L
IMMATURE GRANULOCYTES %: 0.6 % (ref 0–5)
LYMPHOCYTES ABSOLUTE: 1.31 E9/L (ref 1.5–4)
LYMPHOCYTES RELATIVE PERCENT: 13.2 % (ref 20–42)
MAGNESIUM: 1.6 MG/DL (ref 1.6–2.6)
MCH RBC QN AUTO: 29.3 PG (ref 26–35)
MCHC RBC AUTO-ENTMCNC: 32 % (ref 32–34.5)
MCV RBC AUTO: 91.5 FL (ref 80–99.9)
MONOCYTES ABSOLUTE: 0.68 E9/L (ref 0.1–0.95)
MONOCYTES RELATIVE PERCENT: 6.9 % (ref 2–12)
NEUTROPHILS ABSOLUTE: 7.82 E9/L (ref 1.8–7.3)
NEUTROPHILS RELATIVE PERCENT: 78.9 % (ref 43–80)
PDW BLD-RTO: 13.9 FL (ref 11.5–15)
PLATELET # BLD: 520 E9/L (ref 130–450)
PMV BLD AUTO: 9.5 FL (ref 7–12)
POTASSIUM SERPL-SCNC: 3.5 MMOL/L (ref 3.5–5)
RBC # BLD: 3.86 E12/L (ref 3.8–5.8)
SODIUM BLD-SCNC: 143 MMOL/L (ref 132–146)
TOTAL PROTEIN: 6.4 G/DL (ref 6.4–8.3)
WBC # BLD: 9.9 E9/L (ref 4.5–11.5)

## 2020-04-23 PROCEDURE — 80053 COMPREHEN METABOLIC PANEL: CPT

## 2020-04-23 PROCEDURE — 85025 COMPLETE CBC W/AUTO DIFF WBC: CPT

## 2020-04-23 PROCEDURE — 83735 ASSAY OF MAGNESIUM: CPT

## 2020-04-23 PROCEDURE — 36415 COLL VENOUS BLD VENIPUNCTURE: CPT

## 2020-04-23 NOTE — PROGRESS NOTES
current or ex partner: Not on file     Emotionally abused: Not on file     Physically abused: Not on file     Forced sexual activity: Not on file   Other Topics Concern    Not on file   Social History Narrative    Drinks 1-2 cups of coffee daily. Chronic pain. Allergies:  No Known Allergies    Physical Exam:  BP (!) 144/88   Pulse 100   Temp 98.5 °F (36.9 °C)   Ht 6' (1.829 m)   Wt 193 lb 1.6 oz (87.6 kg)   SpO2 94%   BMI 26.19 kg/m²     GENERAL: Alert, oriented x 3, not in acute distress. HEENT: PERRLA; EOMI. Oropharynx clear. Scalp skin is dry. NECK: Supple. No palpable cervical or supraclavicular lymphadenopathy. LUNGS: Decreased air entry on the right side. CARDIOVASCULAR: Regular rate. No murmurs, rubs or gallops. ABDOMEN: Soft. Non-tender, non-distended. Positive bowel sounds. EXTREMITIES: Without clubbing, cyanosis, or edema. NEUROLOGIC: No focal deficits. ECOG PS 1     Impression/Plan:      81 y.o. male with a past medical hx of MI, HTN, DDD, CAD, and tonsillar squamous cell carcinoma. He was treated for his tonsillar cacrcinoma with Cisplatin and 5-Fu and radiation. The patient had presented in February 2020 with cough shortness of breath and dizziness, he was diagnosed with non-small cell lung cancer, adenocarcinoma, he has a large infiltrative mass in the right hilum, bilateral pulmonary nodules, and lymphadenopathy in the chest and upper abdomen, also metastatic disease to the brain. He completed WB RT on 3/25/2020.     The patient had a PET scan done for staging on 3/24/2020, the results documented were reviewed with the patient, he has complete atelectasis of the right lung with large right pleural effusion, hypermetabolic activity in the right hilar location surrounding the right mainstem bronchus, multiple areas of hypermetabolic radiotracer activity with the present pleura notably overlying right upper lobe laterally, anterior and medial right lower lobe as well as pleura

## 2020-04-24 ENCOUNTER — HOSPITAL ENCOUNTER (OUTPATIENT)
Dept: INFUSION THERAPY | Age: 70
Discharge: HOME | End: 2020-04-24
Payer: MEDICARE

## 2020-04-24 ENCOUNTER — OFFICE VISIT (OUTPATIENT)
Dept: ONCOLOGY | Age: 70
End: 2020-04-24
Payer: MEDICARE

## 2020-04-24 VITALS
SYSTOLIC BLOOD PRESSURE: 144 MMHG | DIASTOLIC BLOOD PRESSURE: 88 MMHG | OXYGEN SATURATION: 94 % | HEART RATE: 100 BPM | HEIGHT: 72 IN | WEIGHT: 193.1 LBS | TEMPERATURE: 98.5 F | BODY MASS INDEX: 26.15 KG/M2

## 2020-04-24 VITALS
HEART RATE: 89 BPM | DIASTOLIC BLOOD PRESSURE: 88 MMHG | SYSTOLIC BLOOD PRESSURE: 141 MMHG | TEMPERATURE: 97.8 F | RESPIRATION RATE: 18 BRPM

## 2020-04-24 PROCEDURE — 99214 OFFICE O/P EST MOD 30 MIN: CPT | Performed by: INTERNAL MEDICINE

## 2020-04-24 PROCEDURE — G8427 DOCREV CUR MEDS BY ELIG CLIN: HCPCS | Performed by: INTERNAL MEDICINE

## 2020-04-24 PROCEDURE — 96413 CHEMO IV INFUSION 1 HR: CPT

## 2020-04-24 PROCEDURE — 2580000003 HC RX 258: Performed by: INTERNAL MEDICINE

## 2020-04-24 PROCEDURE — 4040F PNEUMOC VAC/ADMIN/RCVD: CPT | Performed by: INTERNAL MEDICINE

## 2020-04-24 PROCEDURE — 96377 APPLICATON ON-BODY INJECTOR: CPT

## 2020-04-24 PROCEDURE — 1036F TOBACCO NON-USER: CPT | Performed by: INTERNAL MEDICINE

## 2020-04-24 PROCEDURE — 6370000000 HC RX 637 (ALT 250 FOR IP): Performed by: INTERNAL MEDICINE

## 2020-04-24 PROCEDURE — 96417 CHEMO IV INFUS EACH ADDL SEQ: CPT

## 2020-04-24 PROCEDURE — 1123F ACP DISCUSS/DSCN MKR DOCD: CPT | Performed by: INTERNAL MEDICINE

## 2020-04-24 PROCEDURE — 6360000002 HC RX W HCPCS: Performed by: INTERNAL MEDICINE

## 2020-04-24 PROCEDURE — 96367 TX/PROPH/DG ADDL SEQ IV INF: CPT

## 2020-04-24 PROCEDURE — G8417 CALC BMI ABV UP PARAM F/U: HCPCS | Performed by: INTERNAL MEDICINE

## 2020-04-24 PROCEDURE — 96411 CHEMO IV PUSH ADDL DRUG: CPT

## 2020-04-24 PROCEDURE — 3017F COLORECTAL CA SCREEN DOC REV: CPT | Performed by: INTERNAL MEDICINE

## 2020-04-24 PROCEDURE — 96366 THER/PROPH/DIAG IV INF ADDON: CPT

## 2020-04-24 PROCEDURE — 96375 TX/PRO/DX INJ NEW DRUG ADDON: CPT

## 2020-04-24 PROCEDURE — 96361 HYDRATE IV INFUSION ADD-ON: CPT

## 2020-04-24 RX ORDER — DIPHENHYDRAMINE HYDROCHLORIDE 50 MG/ML
50 INJECTION INTRAMUSCULAR; INTRAVENOUS ONCE
Status: CANCELLED | OUTPATIENT
Start: 2020-04-24

## 2020-04-24 RX ORDER — EPINEPHRINE 1 MG/ML
0.3 INJECTION, SOLUTION, CONCENTRATE INTRAVENOUS PRN
Status: CANCELLED | OUTPATIENT
Start: 2020-04-24

## 2020-04-24 RX ORDER — POTASSIUM CHLORIDE 20 MEQ/1
40 TABLET, EXTENDED RELEASE ORAL PRN
Status: DISCONTINUED | OUTPATIENT
Start: 2020-04-24 | End: 2020-04-25 | Stop reason: HOSPADM

## 2020-04-24 RX ORDER — PALONOSETRON HYDROCHLORIDE 0.05 MG/ML
0.25 INJECTION, SOLUTION INTRAVENOUS ONCE
Status: CANCELLED | OUTPATIENT
Start: 2020-04-24

## 2020-04-24 RX ORDER — PALONOSETRON HYDROCHLORIDE 0.05 MG/ML
0.25 INJECTION, SOLUTION INTRAVENOUS ONCE
Status: COMPLETED | OUTPATIENT
Start: 2020-04-24 | End: 2020-04-24

## 2020-04-24 RX ORDER — SODIUM CHLORIDE 0.9 % (FLUSH) 0.9 %
10 SYRINGE (ML) INJECTION PRN
Status: DISCONTINUED | OUTPATIENT
Start: 2020-04-24 | End: 2020-04-25 | Stop reason: HOSPADM

## 2020-04-24 RX ORDER — MEPERIDINE HYDROCHLORIDE 25 MG/ML
12.5 INJECTION INTRAMUSCULAR; INTRAVENOUS; SUBCUTANEOUS ONCE
Status: CANCELLED | OUTPATIENT
Start: 2020-04-24

## 2020-04-24 RX ORDER — SODIUM CHLORIDE 9 MG/ML
INJECTION, SOLUTION INTRAVENOUS
Status: DISCONTINUED
Start: 2020-04-24 | End: 2020-04-25 | Stop reason: HOSPADM

## 2020-04-24 RX ORDER — SODIUM CHLORIDE 9 MG/ML
INJECTION, SOLUTION INTRAVENOUS CONTINUOUS
Status: CANCELLED | OUTPATIENT
Start: 2020-04-24

## 2020-04-24 RX ORDER — 0.9 % SODIUM CHLORIDE 0.9 %
1000 INTRAVENOUS SOLUTION INTRAVENOUS ONCE
Status: CANCELLED
Start: 2020-04-24

## 2020-04-24 RX ORDER — DEXAMETHASONE SODIUM PHOSPHATE 10 MG/ML
10 INJECTION INTRAMUSCULAR; INTRAVENOUS ONCE
Status: COMPLETED | OUTPATIENT
Start: 2020-04-24 | End: 2020-04-24

## 2020-04-24 RX ORDER — HEPARIN SODIUM (PORCINE) LOCK FLUSH IV SOLN 100 UNIT/ML 100 UNIT/ML
500 SOLUTION INTRAVENOUS PRN
Status: CANCELLED | OUTPATIENT
Start: 2020-04-24

## 2020-04-24 RX ORDER — SODIUM CHLORIDE 0.9 % (FLUSH) 0.9 %
5 SYRINGE (ML) INJECTION PRN
Status: CANCELLED | OUTPATIENT
Start: 2020-04-24

## 2020-04-24 RX ORDER — METHYLPREDNISOLONE SODIUM SUCCINATE 125 MG/2ML
125 INJECTION, POWDER, LYOPHILIZED, FOR SOLUTION INTRAMUSCULAR; INTRAVENOUS ONCE
Status: CANCELLED | OUTPATIENT
Start: 2020-04-24

## 2020-04-24 RX ORDER — POTASSIUM CHLORIDE 20 MEQ/1
40 TABLET, EXTENDED RELEASE ORAL PRN
Status: CANCELLED
Start: 2020-04-24

## 2020-04-24 RX ORDER — 0.9 % SODIUM CHLORIDE 0.9 %
1000 INTRAVENOUS SOLUTION INTRAVENOUS ONCE
Status: COMPLETED | OUTPATIENT
Start: 2020-04-24 | End: 2020-04-24

## 2020-04-24 RX ORDER — SODIUM CHLORIDE 0.9 % (FLUSH) 0.9 %
10 SYRINGE (ML) INJECTION PRN
Status: CANCELLED | OUTPATIENT
Start: 2020-04-24

## 2020-04-24 RX ORDER — DEXAMETHASONE SODIUM PHOSPHATE 10 MG/ML
10 INJECTION, SOLUTION INTRAMUSCULAR; INTRAVENOUS ONCE
Status: CANCELLED | OUTPATIENT
Start: 2020-04-24

## 2020-04-24 RX ADMIN — FOSAPREPITANT 150 MG: 150 INJECTION, POWDER, LYOPHILIZED, FOR SOLUTION INTRAVENOUS at 12:39

## 2020-04-24 RX ADMIN — DEXAMETHASONE SODIUM PHOSPHATE 10 MG: 10 INJECTION INTRAMUSCULAR; INTRAVENOUS at 12:34

## 2020-04-24 RX ADMIN — SODIUM CHLORIDE, PRESERVATIVE FREE 10 ML: 5 INJECTION INTRAVENOUS at 12:34

## 2020-04-24 RX ADMIN — SODIUM CHLORIDE, PRESERVATIVE FREE 10 ML: 5 INJECTION INTRAVENOUS at 12:25

## 2020-04-24 RX ADMIN — SODIUM CHLORIDE 1000 ML: 9 INJECTION, SOLUTION INTRAVENOUS at 12:27

## 2020-04-24 RX ADMIN — SODIUM CHLORIDE 200 MG: 9 INJECTION, SOLUTION INTRAVENOUS at 13:25

## 2020-04-24 RX ADMIN — PEGFILGRASTIM 6 MG: KIT SUBCUTANEOUS at 15:04

## 2020-04-24 RX ADMIN — SODIUM CHLORIDE 1000 MG: 9 INJECTION, SOLUTION INTRAVENOUS at 14:08

## 2020-04-24 RX ADMIN — POTASSIUM CHLORIDE 40 MEQ: 1500 TABLET, EXTENDED RELEASE ORAL at 12:30

## 2020-04-24 RX ADMIN — CARBOPLATIN 500 MG: 10 INJECTION, SOLUTION INTRAVENOUS at 14:25

## 2020-04-24 RX ADMIN — PALONOSETRON 0.25 MG: 0.25 INJECTION, SOLUTION INTRAVENOUS at 12:31

## 2020-04-27 ENCOUNTER — HOSPITAL ENCOUNTER (OUTPATIENT)
Age: 70
Setting detail: OUTPATIENT SURGERY
Discharge: HOME | End: 2020-04-27
Attending: INTERNAL MEDICINE | Admitting: INTERNAL MEDICINE
Payer: MEDICARE

## 2020-04-27 ENCOUNTER — APPOINTMENT (OUTPATIENT)
Dept: GENERAL RADIOLOGY | Age: 70
End: 2020-04-27
Attending: INTERNAL MEDICINE
Payer: MEDICARE

## 2020-04-27 ENCOUNTER — APPOINTMENT (OUTPATIENT)
Dept: ULTRASOUND IMAGING | Age: 70
End: 2020-04-27
Attending: INTERNAL MEDICINE
Payer: MEDICARE

## 2020-04-27 VITALS
HEART RATE: 87 BPM | WEIGHT: 193 LBS | HEIGHT: 72 IN | DIASTOLIC BLOOD PRESSURE: 75 MMHG | BODY MASS INDEX: 26.14 KG/M2 | TEMPERATURE: 98.2 F | SYSTOLIC BLOOD PRESSURE: 128 MMHG | RESPIRATION RATE: 16 BRPM | OXYGEN SATURATION: 95 %

## 2020-04-27 PROCEDURE — 2709999900 HC NON-CHARGEABLE SUPPLY: Performed by: INTERNAL MEDICINE

## 2020-04-27 PROCEDURE — 2500000003 HC RX 250 WO HCPCS: Performed by: INTERNAL MEDICINE

## 2020-04-27 PROCEDURE — 32550 INSERT PLEURAL CATH: CPT

## 2020-04-27 PROCEDURE — 32550 INSERT PLEURAL CATH: CPT | Performed by: INTERNAL MEDICINE

## 2020-04-27 PROCEDURE — 7100000011 HC PHASE II RECOVERY - ADDTL 15 MIN: Performed by: INTERNAL MEDICINE

## 2020-04-27 PROCEDURE — 71045 X-RAY EXAM CHEST 1 VIEW: CPT

## 2020-04-27 PROCEDURE — C1729 CATH, DRAINAGE: HCPCS

## 2020-04-27 PROCEDURE — 7100000010 HC PHASE II RECOVERY - FIRST 15 MIN: Performed by: INTERNAL MEDICINE

## 2020-04-27 RX ORDER — LIDOCAINE HYDROCHLORIDE 10 MG/ML
INJECTION, SOLUTION INFILTRATION; PERINEURAL PRN
Status: DISCONTINUED | OUTPATIENT
Start: 2020-04-27 | End: 2020-04-27 | Stop reason: ALTCHOICE

## 2020-04-27 ASSESSMENT — PAIN DESCRIPTION - LOCATION
LOCATION: OTHER (COMMENT)
LOCATION: OTHER (COMMENT)

## 2020-04-27 ASSESSMENT — PAIN SCALES - GENERAL
PAINLEVEL_OUTOF10: 5
PAINLEVEL_OUTOF10: 7
PAINLEVEL_OUTOF10: 0
PAINLEVEL_OUTOF10: 6

## 2020-04-27 ASSESSMENT — PAIN DESCRIPTION - ORIENTATION
ORIENTATION: RIGHT

## 2020-04-27 ASSESSMENT — PAIN DESCRIPTION - DESCRIPTORS
DESCRIPTORS: ACHING;DISCOMFORT

## 2020-04-27 ASSESSMENT — PAIN - FUNCTIONAL ASSESSMENT: PAIN_FUNCTIONAL_ASSESSMENT: 0-10

## 2020-04-27 ASSESSMENT — PAIN DESCRIPTION - ONSET
ONSET: ON-GOING
ONSET: GRADUAL

## 2020-04-27 NOTE — H&P
before chemotherapy, the day of and day after chemotherapy.  4/3/20  Yes Sarah Schumacher MD   loratadine (CLARITIN) 10 MG tablet Take 1 tablet by mouth daily 4/3/20  Yes Sarah Schumacher MD   ondansetron (ZOFRAN) 4 MG tablet Take 1 tablet by mouth every 8 hours as needed for Nausea or Vomiting 4/3/20  Yes Sarah Schumacher MD   folic acid (FOLVITE) 1 MG tablet Take 1 tablet by mouth daily 4/3/20  Yes Sarah Schumacher MD   cloNIDine (CATAPRES) 0.2 MG tablet take 1 tablet by mouth twice a day 4/1/20  Yes Gurinder Rae MD   amLODIPine (NORVASC) 10 MG tablet take 1 tablet by mouth every morning before breakfast 4/1/20  Yes Gurinder Rae MD   allopurinol (ZYLOPRIM) 100 MG tablet take 1 tablet by mouth twice a day 4/1/20  Yes Gurinder Rae MD   hydrochlorothiazide (HYDRODIURIL) 12.5 MG tablet take 1 tablet by mouth once daily 4/1/20  Yes Gurinder Rae MD   traZODone (DESYREL) 50 MG tablet Take 1 tablet by mouth nightly 3/23/20  Yes Gurinder Rae MD   clotrimazole (MYCELEX) 10 MG daryl Take 10 mg by mouth 5 times daily   Yes Marga Zuñiga MD   losartan (COZAAR) 100 MG tablet take 1 tablet by mouth IN THE MORNING 3/16/20  Yes Gurinder Rae MD   memantine [facility]) 10 MG tablet Take 1 tablet by mouth 2 times daily 3/4/20  Yes Bradley Manuel III, MD   albuterol sulfate HFA (PROVENTIL HFA) 108 (90 Base) MCG/ACT inhaler Inhale 2 puffs into the lungs 4 times daily 2/25/20  Yes Gurinder Rae MD   nicotine (NICODERM CQ) 14 MG/24HR Place 1 patch onto the skin daily 2/22/20  Yes Tra Cho MD   tiotropium (Nova Starcher) 18 MCG inhalation capsule Inhale 1 capsule into the lungs daily 2/21/20  Yes Tra Cho MD   ipratropium-albuterol (DUONEB) 0.5-2.5 (3) MG/3ML SOLN nebulizer solution Inhale 3 mLs into the lungs every 4 hours as needed for Shortness of Breath 2/21/20  Yes Tra Cho MD   oxyCODONE-acetaminophen (PERCOCET) 7.5-325 MG per tablet take 1 tablet
every 4 hours as needed for Shortness of Breath 2/21/20   Nimesh Pedroza MD   atorvastatin (LIPITOR) 10 MG tablet take 1 tablet by mouth at bedtime 10/10/19   Henrietta Nowak MD   pantoprazole (PROTONIX) 40 MG tablet Take 1 tablet by mouth daily 8/26/19   Henrietta Nowak MD   metoprolol succinate (TOPROL XL) 50 MG extended release tablet Take 1 tablet by mouth every morning 7/26/19   Henrietta Nowak MD   [facility] 4 MG/0.1ML LIQD nasal spray instill 1 spray in 1 NOSTRIL if needed for opioid overdose may re. ..  (REFER TO PRESCRIPTION NOTES). 1/3/19   Historical Provider, MD       CURRENT MEDICATIONS:  No current facility-administered medications for this encounter. IV MEDICATIONS:      ALLERGIES:  No Known Allergies    REVIEW OF SYSTEMS:  General ROS:  No weight loss ,no fatigue     ENT ROS:   No Sore throat ,no lymphoadenopathy,no nasal stuffiness     Hematological and Lymphatic ROS:   No ecchymosis ,no tendency to bleed  Respiratory ROS:   Shortness of breath, cough  Cardiovascular ROS:   No CP,No Palpitation   Gastrointestinal ROS:   No Gi bleed,no nausea or vomiting, does have some abdominal pain     - Musculoskeletal ROS:      - no joint swelling ,no joint pain   Neurological ROS:     -no weakness or numbness    Dermatological ROS:   No skin rash ,no urticaria     PHYSICAL EXAMINATION:     VITAL SIGNS:  There were no vitals taken for this visit. Wt Readings from Last 3 Encounters:   04/24/20 193 lb 1.6 oz (87.6 kg)   04/15/20 200 lb (90.7 kg)   04/17/20 187 lb 14.4 oz (85.2 kg)     Temp Readings from Last 3 Encounters:   04/24/20 97.8 °F (36.6 °C) (Temporal)   04/24/20 98.5 °F (36.9 °C)   04/17/20 97.6 °F (36.4 °C) (Temporal)     TMAX:  BP Readings from Last 3 Encounters:   04/24/20 (!) 141/88   04/24/20 (!) 144/88   04/17/20 (!) 143/74     Pulse Readings from Last 3 Encounters:   04/24/20 89   04/24/20 100   04/17/20 82           INTAKE/OUTPUTS:  No intake/output data recorded.   No intake or

## 2020-04-27 NOTE — PROGRESS NOTES
I already gave patient instructions and will fax paper to get him bottles    My office will contact Patient   Patient state he has friend close y who know how to do   Instruction given  Patient express understanding and if any issues to call him  Will call him any how to make sure arrangement taken care of
Unable to reach Dr. Soyla Peabody to clarify if he wants [facility] to drain pleurX cath. There are instructions to drain one bottle everyday but did not specify who is draining it, pt does not feel as though he will be able to do this himself. Attempted to reach via [employer] message and voice call without a response.
greeted and checked in by the [employer].  In addition, you will be registered in the [facility] by a Patient Access Representative. Please bring your photo ID and insurance card. A nurse will greet you in accordance to the time you are needed in the pre-op area to prepare you for surgery. Please do not be discouraged if you are not greeted in the order you arrive as there are many variables that are involved in patient preparation. Your patience is greatly appreciated as you wait for your nurse. Please bring in items such as: books, magazines, newspapers, electronics, or any other items  to occupy your time in the waiting area. []  Delays may occur with surgery and staff will make a sincere effort to keep you informed of delays. If any delays occur with your procedure, we apologize ahead of time for your inconvenience as we recognize the value of your time.

## 2020-04-28 ENCOUNTER — TELEPHONE (OUTPATIENT)
Dept: PULMONOLOGY | Age: 70
End: 2020-04-28

## 2020-04-28 NOTE — TELEPHONE ENCOUNTER
Patient states that he is doing okay and breathing okay. He says that he is not touching his pleurx catheter and has no one at home that can care or drain the catheter. Dr. Arsen Guerrero notified and okayed an order for home health to go to patient's home to drain and care for catheter. Patient notified of the order and he is okay with this as well.

## 2020-05-05 ENCOUNTER — OFFICE VISIT (OUTPATIENT)
Dept: FAMILY MEDICINE CLINIC | Age: 70
End: 2020-05-05
Payer: MEDICARE

## 2020-05-05 VITALS
HEIGHT: 72 IN | HEART RATE: 110 BPM | TEMPERATURE: 97.4 F | DIASTOLIC BLOOD PRESSURE: 60 MMHG | RESPIRATION RATE: 22 BRPM | SYSTOLIC BLOOD PRESSURE: 91 MMHG | WEIGHT: 176 LBS | OXYGEN SATURATION: 95 % | BODY MASS INDEX: 23.84 KG/M2

## 2020-05-05 PROCEDURE — 1123F ACP DISCUSS/DSCN MKR DOCD: CPT | Performed by: STUDENT IN AN ORGANIZED HEALTH CARE EDUCATION/TRAINING PROGRAM

## 2020-05-05 PROCEDURE — 4040F PNEUMOC VAC/ADMIN/RCVD: CPT | Performed by: STUDENT IN AN ORGANIZED HEALTH CARE EDUCATION/TRAINING PROGRAM

## 2020-05-05 PROCEDURE — G8427 DOCREV CUR MEDS BY ELIG CLIN: HCPCS | Performed by: STUDENT IN AN ORGANIZED HEALTH CARE EDUCATION/TRAINING PROGRAM

## 2020-05-05 PROCEDURE — 1036F TOBACCO NON-USER: CPT | Performed by: STUDENT IN AN ORGANIZED HEALTH CARE EDUCATION/TRAINING PROGRAM

## 2020-05-05 PROCEDURE — 99214 OFFICE O/P EST MOD 30 MIN: CPT | Performed by: STUDENT IN AN ORGANIZED HEALTH CARE EDUCATION/TRAINING PROGRAM

## 2020-05-05 PROCEDURE — G8420 CALC BMI NORM PARAMETERS: HCPCS | Performed by: STUDENT IN AN ORGANIZED HEALTH CARE EDUCATION/TRAINING PROGRAM

## 2020-05-05 PROCEDURE — 3017F COLORECTAL CA SCREEN DOC REV: CPT | Performed by: STUDENT IN AN ORGANIZED HEALTH CARE EDUCATION/TRAINING PROGRAM

## 2020-05-05 NOTE — PROGRESS NOTES
Psychiatric/Behavioral: Positive for dysphoric mood. Negative for agitation and suicidal ideas. The patient is not nervous/anxious. Prior to Visit Medications    Medication Sig Taking? Authorizing Provider   morphine (MS CONTIN) 15 MG extended release tablet Take 1 tablet by mouth every 12 hours. Yes Historical Provider, MD   prochlorperazine (COMPAZINE) 10 MG tablet Take 1 tablet by mouth every 6 hours as needed (nausea) Yes Scott Huff MD   dexamethasone (DECADRON) 4 MG tablet Take 1 tablet by mouth 2 times daily Take for 3 days, Starting the day before chemotherapy, the day of and day after chemotherapy.  Yes Scott Huff MD   loratadine (CLARITIN) 10 MG tablet Take 1 tablet by mouth daily Yes Scott Huff MD   ondansetron (ZOFRAN) 4 MG tablet Take 1 tablet by mouth every 8 hours as needed for Nausea or Vomiting Yes Scott Huff MD   folic acid (FOLVITE) 1 MG tablet Take 1 tablet by mouth daily Yes Scott Huff MD   cloNIDine (CATAPRES) 0.2 MG tablet take 1 tablet by mouth twice a day Yes Benedicto Chiu MD   amLODIPine (NORVASC) 10 MG tablet take 1 tablet by mouth every morning before breakfast Yes Benedicto Chiu MD   allopurinol (ZYLOPRIM) 100 MG tablet take 1 tablet by mouth twice a day Yes Benedicto Chiu MD   hydrochlorothiazide (HYDRODIURIL) 12.5 MG tablet take 1 tablet by mouth once daily Yes Benedicto Chiu MD   traZODone (DESYREL) 50 MG tablet Take 1 tablet by mouth nightly Yes Benedicto Chiu MD   clotrimazole (MYCELEX) 10 MG daryl Take 10 mg by mouth 5 times daily Yes Historical Provider, MD   losartan (COZAAR) 100 MG tablet take 1 tablet by mouth IN THE MORNING Yes Benedicto Chiu MD   memantine (NAMENDA) 10 MG tablet Take 1 tablet by mouth 2 times daily Yes Beryle Artis III, MD   albuterol sulfate HFA (PROVENTIL HFA) 108 (90 Base) MCG/ACT inhaler Inhale 2 puffs into the lungs 4 times daily Yes Benedicto Chiu MD   nicotine (Eliseo Blew) 14 ASSESSMENT/PLAN:  1. Hyperpigmentation of skin  - secondary to chemotherapy?  - continue to monitor and recommendations from oncologist    2. Dehydration  - Spoke with patient and he vehemently refuses ER  - Coordination with oncologist to set up fluids for rehydration at infusion center the following day. Return if symptoms worsen or fail to improve. An electronic signature was used to authenticate this note.     --Fabian Watkins MD on 5/7/2020 at 9:59 AM

## 2020-05-05 NOTE — PROGRESS NOTES
Attending Physician Statement    S:   Chief Complaint   Patient presents with    Other     bilateral hands turning colors      Patient is a 81year old male here for concern for hands turning blue. Has gone through radiation. Has a pleurex catheter on right side that is drained daily. Has received two rounds of chemo. Due for third in a few weeks. No numbness in tingling in hands , no weakness or pain. Discoloration of his hands bilaterally. Is not feeling well. Has not been eating. Losing weight. Urinating well. O: Blood pressure 91/60, pulse 110, temperature 97.4 °F (36.3 °C), temperature source Oral, resp. rate 22, height 6' (1.829 m), weight 176 lb (79.8 kg), SpO2 95 %. Exam:   Heart - RRR   Lungs - clear   Skin - hyperpigmentation of bilateral palms   A: dehydration , skin changes   P:  Refused to go to ER . Would be willing to go to infusion center for ivf but would not agree to go until tomorrow. Skin changes most likely from chemo    Follow-up as ordered    Attending Attestation   I have discussed the case, including pertinent history and exam findings with the resident. I also have personally seen and examined the patient. I agree with the documented assessment and plan.

## 2020-05-06 ENCOUNTER — HOSPITAL ENCOUNTER (OUTPATIENT)
Dept: INFUSION THERAPY | Age: 70
Discharge: HOME | End: 2020-05-06
Payer: MEDICARE

## 2020-05-06 VITALS
SYSTOLIC BLOOD PRESSURE: 98 MMHG | HEART RATE: 97 BPM | DIASTOLIC BLOOD PRESSURE: 58 MMHG | TEMPERATURE: 98.3 F | RESPIRATION RATE: 18 BRPM

## 2020-05-06 DIAGNOSIS — C34.81 MALIGNANT NEOPLASM OF OVERLAPPING SITES OF RIGHT BRONCHUS AND LUNG (HCC): ICD-10-CM

## 2020-05-06 DIAGNOSIS — C34.90 ADENOCARCINOMA OF LUNG, UNSPECIFIED LATERALITY (HCC): Primary | ICD-10-CM

## 2020-05-06 DIAGNOSIS — C34.90 NON-SMALL CELL CARCINOMA OF LUNG (HCC): ICD-10-CM

## 2020-05-06 DIAGNOSIS — Z51.11 ENCOUNTER FOR ANTINEOPLASTIC CHEMOTHERAPY: ICD-10-CM

## 2020-05-06 LAB
ALBUMIN SERPL-MCNC: 3.3 G/DL (ref 3.5–5.2)
ALP BLD-CCNC: 91 U/L (ref 40–129)
ALT SERPL-CCNC: 16 U/L (ref 0–40)
ANION GAP SERPL CALCULATED.3IONS-SCNC: 14 MMOL/L (ref 7–16)
AST SERPL-CCNC: 24 U/L (ref 0–39)
BASOPHILS ABSOLUTE: 0.05 E9/L (ref 0–0.2)
BASOPHILS RELATIVE PERCENT: 0.6 % (ref 0–2)
BILIRUB SERPL-MCNC: <0.2 MG/DL (ref 0–1.2)
BUN BLDV-MCNC: 32 MG/DL (ref 8–23)
CALCIUM SERPL-MCNC: 9.8 MG/DL (ref 8.6–10.2)
CHLORIDE BLD-SCNC: 97 MMOL/L (ref 98–107)
CO2: 29 MMOL/L (ref 22–29)
CREAT SERPL-MCNC: 1.3 MG/DL (ref 0.7–1.2)
EOSINOPHILS ABSOLUTE: 0.06 E9/L (ref 0.05–0.5)
EOSINOPHILS RELATIVE PERCENT: 0.7 % (ref 0–6)
GFR AFRICAN AMERICAN: >60
GFR NON-AFRICAN AMERICAN: >60 ML/MIN/1.73
GLUCOSE BLD-MCNC: 120 MG/DL (ref 74–99)
HCT VFR BLD CALC: 32.6 % (ref 37–54)
HEMOGLOBIN: 10.4 G/DL (ref 12.5–16.5)
IMMATURE GRANULOCYTES #: 0.38 E9/L
IMMATURE GRANULOCYTES %: 4.3 % (ref 0–5)
LYMPHOCYTES ABSOLUTE: 2.8 E9/L (ref 1.5–4)
LYMPHOCYTES RELATIVE PERCENT: 31.9 % (ref 20–42)
MAGNESIUM: 1.8 MG/DL (ref 1.6–2.6)
MCH RBC QN AUTO: 29.1 PG (ref 26–35)
MCHC RBC AUTO-ENTMCNC: 31.9 % (ref 32–34.5)
MCV RBC AUTO: 91.1 FL (ref 80–99.9)
MONOCYTES ABSOLUTE: 0.54 E9/L (ref 0.1–0.95)
MONOCYTES RELATIVE PERCENT: 6.2 % (ref 2–12)
NEUTROPHILS ABSOLUTE: 4.95 E9/L (ref 1.8–7.3)
NEUTROPHILS RELATIVE PERCENT: 56.3 % (ref 43–80)
PDW BLD-RTO: 13.3 FL (ref 11.5–15)
PLATELET # BLD: 67 E9/L (ref 130–450)
PLATELET CONFIRMATION: NORMAL
PMV BLD AUTO: 11.6 FL (ref 7–12)
POLYCHROMASIA: ABNORMAL
POTASSIUM SERPL-SCNC: 3.7 MMOL/L (ref 3.5–5)
RBC # BLD: 3.58 E12/L (ref 3.8–5.8)
SODIUM BLD-SCNC: 140 MMOL/L (ref 132–146)
TOTAL PROTEIN: 6.8 G/DL (ref 6.4–8.3)
WBC # BLD: 8.8 E9/L (ref 4.5–11.5)

## 2020-05-06 PROCEDURE — 80053 COMPREHEN METABOLIC PANEL: CPT

## 2020-05-06 PROCEDURE — 96360 HYDRATION IV INFUSION INIT: CPT

## 2020-05-06 PROCEDURE — 2580000003 HC RX 258: Performed by: NURSE PRACTITIONER

## 2020-05-06 PROCEDURE — 85025 COMPLETE CBC W/AUTO DIFF WBC: CPT

## 2020-05-06 PROCEDURE — 83735 ASSAY OF MAGNESIUM: CPT

## 2020-05-06 RX ORDER — 0.9 % SODIUM CHLORIDE 0.9 %
1000 INTRAVENOUS SOLUTION INTRAVENOUS ONCE
Status: CANCELLED | OUTPATIENT
Start: 2020-05-06

## 2020-05-06 RX ORDER — HEPARIN SODIUM (PORCINE) LOCK FLUSH IV SOLN 100 UNIT/ML 100 UNIT/ML
500 SOLUTION INTRAVENOUS PRN
Status: CANCELLED | OUTPATIENT
Start: 2020-05-06

## 2020-05-06 RX ORDER — HEPARIN SODIUM (PORCINE) LOCK FLUSH IV SOLN 100 UNIT/ML 100 UNIT/ML
500 SOLUTION INTRAVENOUS PRN
Status: DISCONTINUED | OUTPATIENT
Start: 2020-05-06 | End: 2020-05-07 | Stop reason: HOSPADM

## 2020-05-06 RX ORDER — 0.9 % SODIUM CHLORIDE 0.9 %
1000 INTRAVENOUS SOLUTION INTRAVENOUS ONCE
Status: COMPLETED | OUTPATIENT
Start: 2020-05-06 | End: 2020-05-06

## 2020-05-06 RX ORDER — SODIUM CHLORIDE 0.9 % (FLUSH) 0.9 %
10 SYRINGE (ML) INJECTION PRN
Status: DISCONTINUED | OUTPATIENT
Start: 2020-05-06 | End: 2020-05-07 | Stop reason: HOSPADM

## 2020-05-06 RX ORDER — SODIUM CHLORIDE 0.9 % (FLUSH) 0.9 %
10 SYRINGE (ML) INJECTION PRN
Status: CANCELLED | OUTPATIENT
Start: 2020-05-06

## 2020-05-06 RX ADMIN — SODIUM CHLORIDE 1000 ML: 9 INJECTION, SOLUTION INTRAVENOUS at 12:27

## 2020-05-06 RX ADMIN — SODIUM CHLORIDE, PRESERVATIVE FREE 10 ML: 5 INJECTION INTRAVENOUS at 12:27

## 2020-05-07 ASSESSMENT — ENCOUNTER SYMPTOMS
EYE PAIN: 0
CHEST TIGHTNESS: 0
WHEEZING: 0
CONSTIPATION: 0
VOMITING: 0
DIARRHEA: 0
COUGH: 0
SORE THROAT: 0
ABDOMINAL DISTENTION: 0
TROUBLE SWALLOWING: 0
BLOOD IN STOOL: 0
EYE ITCHING: 0
SHORTNESS OF BREATH: 0
NAUSEA: 0
ABDOMINAL PAIN: 0
COLOR CHANGE: 1
RHINORRHEA: 0
BACK PAIN: 0

## 2020-05-07 ASSESSMENT — VISUAL ACUITY: OU: 1

## 2020-05-12 NOTE — PROGRESS NOTES
[address] MED ONCOLOGY  [facility] [address]  Dept: [phone]  Attending Consult Note      Reason for Visit:   Metastatic non-small cell lung cancer, metastatic to the brain. Referring Physician:  Bryan Rodrigez MD    PCP:  Jv Garland MD    History of Present Illness:    81 y.o. male with a past medical hx of MI, HTN, DDD, CAD, and tonsillar squamous cell carcinoma. He was treated for his tonsillar cacrcinoma by Dr. Apolinar Diaz at the [facility] in 2009 with Cisplatin and 5-Fu and radiation.      Hx smoking 0.52 packs of cigarettes per day for 31 years.      Admitted to the ED on 02/19/2020 with lung mass after complaints of cough, dizziness, and SOB beginning 2 months ago.      CTA chest 02/19/2020:  Large infiltrative mass in the right hilum extending to the right apex  concerning for malignancy with narrowing of the right upper lobe  bronchus and segmental branches of right upper lobe pulmonary artery which are occluded and invaded by tumor. No other central pulmonary embolism is identified. Multiple bilateral pulmonary nodules are identified which may represent metastasis. Further assessment by PET/CT scan and/or bronchoscopy is recommended.   Large pleural effusion and atelectasis in the right lower lobe.     CT abdomen/pelvis 02/19/2020:  Large pleural effusion in right lung base with atelectasis in the  right lower lobe and minimal pleural effusion in left base. Pneumonia is considered. Partially identified are multiple pulmonary nodules with  lymph nodes  in the cardiophrenic angle and upper abdomen concerning for  malignancy. Consider PET CT scan. Cystic lesions in liver and kidneys. There is no acute inflammation or bowel obstruction.    There is thickening of the GE junction.       He is s/p right thoracentesis on 2/20/2020, 100 mL of straw-colored/clear pleural fluid removed, cytology was negative for malignant cells. He had on 2/28/2020 [facility]/EBUS with FNA of station L4, station 7, cytology was positive for malignant cells, adenocarcinoma. He had a brain MRI done on 2/21/2020 revealingLeptomeningeal enhancement with possible involvement of the underlying cortex involving the left frontal gyrus. Considering history of lung mass, this is suspicious for metastatic disease. Other infectious and  inflammatory etiologies are also considered with primary malignancy felt less likely. The patient completed WB RT on 3/25/2020. He was started on systemic palliative therapy with carboplatin Alimta and Keytruda on 4/3/2020. He had a right thoracentesis done on 4/15/2020, 2200 cc of dark dariel-colored fluid was removed, no cytology was sent. The shortness of breath had improved, he is draining the Pleurx catheter about every other day. As of breath had improved. He denies any nausea vomiting or diarrhea, but he has fatigue and poor appetite. Review of Systems;    CONSTITUTIONAL: No fever, chills. Positive for decreased appetite . Fatigue. ENMT: Eyes: No diplopia; Nose: No epistaxis. Mouth: No sore throat. RESPIRATORY: No hemoptysis, positive for improved shortness of breath and cough. CARDIOVASCULAR: No chest pain, palpitations. GASTROINTESTINAL: No nausea/vomiting, abdominal pain, diarrhea/constipation. GENITOURINARY: No dysuria, urinary frequency, hematuria. NEURO: No syncope, presyncope, he has mild headaches.   Remainder:  ROS NEGATIVE  Past Medical History:      Diagnosis Date    CAD (coronary artery disease)     DDD (degenerative disc disease) 2/1/2011    Hiatal hernia with gastroesophageal reflux disease and esophagitis     Hyperlipidemia     Hypertension     Inguinal hernia     left  S/P repair 11/29/17    Intervertebral disk disease     Lung cancer ([address]     MI (myocardial infarction) ([address]     Pneumonia 2/21/2020    Primary tonsillar squamous cell carcinoma ([address] 7/8/2013 St[facility] High if he is responding to treatment. Decreased appetite, weight loss, follow-up with [facility]. Follow-up with palliative medicine for symptoms management. RTC in 1 week, with labs, hydration. Thank you for allowing us to participate in the care of Mr. Emilia Ellison.     Reinhold Kocher, MD   HEMATOLOGY/MEDICAL ONCOLOGY  [address] [place] [address] ONCOLOGY  [facility] [address] [facility] [address]  Dept: [phone]

## 2020-05-13 ENCOUNTER — TELEPHONE (OUTPATIENT)
Dept: FAMILY MEDICINE CLINIC | Age: 70
End: 2020-05-13

## 2020-05-13 ENCOUNTER — TELEPHONE (OUTPATIENT)
Dept: PULMONOLOGY | Age: 70
End: 2020-05-13

## 2020-05-13 NOTE — TELEPHONE ENCOUNTER
Patient called and wanted to see about getting an electrical wheelchair. He is having a hard time walking and can not get around. He said it takes everything out of him to just get down the riley.    Thanks, Kaci DOLL

## 2020-05-14 ENCOUNTER — TELEPHONE (OUTPATIENT)
Dept: INFUSION THERAPY | Age: 70
End: 2020-05-14

## 2020-05-14 ENCOUNTER — HOSPITAL ENCOUNTER (OUTPATIENT)
Dept: INFUSION THERAPY | Age: 70
Discharge: HOME | End: 2020-05-14
Payer: MEDICARE

## 2020-05-14 DIAGNOSIS — C34.81 MALIGNANT NEOPLASM OF OVERLAPPING SITES OF RIGHT BRONCHUS AND LUNG (HCC): ICD-10-CM

## 2020-05-14 DIAGNOSIS — C34.90 ADENOCARCINOMA OF LUNG, UNSPECIFIED LATERALITY (HCC): ICD-10-CM

## 2020-05-14 DIAGNOSIS — C34.90 NON-SMALL CELL CARCINOMA OF LUNG (HCC): ICD-10-CM

## 2020-05-14 LAB
ALBUMIN SERPL-MCNC: 3.4 G/DL (ref 3.5–5.2)
ALP BLD-CCNC: 81 U/L (ref 40–129)
ALT SERPL-CCNC: 9 U/L (ref 0–40)
ANION GAP SERPL CALCULATED.3IONS-SCNC: 15 MMOL/L (ref 7–16)
AST SERPL-CCNC: 21 U/L (ref 0–39)
BASOPHILS ABSOLUTE: 0.01 E9/L (ref 0–0.2)
BASOPHILS RELATIVE PERCENT: 0.1 % (ref 0–2)
BILIRUB SERPL-MCNC: 0.3 MG/DL (ref 0–1.2)
BUN BLDV-MCNC: 16 MG/DL (ref 8–23)
CALCIUM SERPL-MCNC: 9.4 MG/DL (ref 8.6–10.2)
CHLORIDE BLD-SCNC: 97 MMOL/L (ref 98–107)
CO2: 26 MMOL/L (ref 22–29)
CREAT SERPL-MCNC: 1.2 MG/DL (ref 0.7–1.2)
EOSINOPHILS ABSOLUTE: 0.01 E9/L (ref 0.05–0.5)
EOSINOPHILS RELATIVE PERCENT: 0.1 % (ref 0–6)
GFR AFRICAN AMERICAN: >60
GFR NON-AFRICAN AMERICAN: >60 ML/MIN/1.73
GLUCOSE BLD-MCNC: 100 MG/DL (ref 74–99)
HCT VFR BLD CALC: 31.5 % (ref 37–54)
HEMOGLOBIN: 10.3 G/DL (ref 12.5–16.5)
IMMATURE GRANULOCYTES #: 0.13 E9/L
IMMATURE GRANULOCYTES %: 0.9 % (ref 0–5)
LYMPHOCYTES ABSOLUTE: 1.76 E9/L (ref 1.5–4)
LYMPHOCYTES RELATIVE PERCENT: 12.4 % (ref 20–42)
MAGNESIUM: 1.4 MG/DL (ref 1.6–2.6)
MCH RBC QN AUTO: 29.5 PG (ref 26–35)
MCHC RBC AUTO-ENTMCNC: 32.7 % (ref 32–34.5)
MCV RBC AUTO: 90.3 FL (ref 80–99.9)
MONOCYTES ABSOLUTE: 0.78 E9/L (ref 0.1–0.95)
MONOCYTES RELATIVE PERCENT: 5.5 % (ref 2–12)
NEUTROPHILS ABSOLUTE: 11.47 E9/L (ref 1.8–7.3)
NEUTROPHILS RELATIVE PERCENT: 81 % (ref 43–80)
PDW BLD-RTO: 14.2 FL (ref 11.5–15)
PLATELET # BLD: 530 E9/L (ref 130–450)
PMV BLD AUTO: 9.3 FL (ref 7–12)
POTASSIUM SERPL-SCNC: 3.9 MMOL/L (ref 3.5–5)
RBC # BLD: 3.49 E12/L (ref 3.8–5.8)
SODIUM BLD-SCNC: 138 MMOL/L (ref 132–146)
TOTAL PROTEIN: 6.9 G/DL (ref 6.4–8.3)
WBC # BLD: 14.2 E9/L (ref 4.5–11.5)

## 2020-05-14 PROCEDURE — 85025 COMPLETE CBC W/AUTO DIFF WBC: CPT

## 2020-05-14 PROCEDURE — 83735 ASSAY OF MAGNESIUM: CPT

## 2020-05-14 PROCEDURE — 36415 COLL VENOUS BLD VENIPUNCTURE: CPT

## 2020-05-14 PROCEDURE — 80053 COMPREHEN METABOLIC PANEL: CPT

## 2020-05-14 NOTE — TELEPHONE ENCOUNTER
no        Recommendations: increase to Ensure Enlive 4 times daily to provide 1400kcal, 80gm PRO; Encouraged to continue to attempt meals, using flavor enhancing techniques to provide adequate calories and protein to meet remaining needs. [facility] GUIDELINES FOR CLINICAL CHARACTERISTICS OF MALNUTRITION IN CHRONIC ILLNESS     Moderate Malnutrition  Severe Malnutrition    Energy intake  <75% energy intake compared to estimated needs for >1month <75% energy intake compared to estimated needs for >1month   Weight changes  5% x 1 month  7.5% x 3 months   10% x 6 months   20% x 1 year  >5% x 1 month  >7.5% x 3 months   >10% x 6 months   >20% x 1 year    Physical findings  Mild   Decrease subcutaneous fat    Decrease muscle mass     Increase fluid/edema   Severe  Decrease subcutaneous fat    Decrease muscle mass     Increase fluid/edema      Severe malnutrition evidenced by 14.5% weight loss in <3mo, <50% intake x3-6mo.       Mayuri Burr

## 2020-05-15 ENCOUNTER — OFFICE VISIT (OUTPATIENT)
Dept: ONCOLOGY | Age: 70
End: 2020-05-15
Payer: MEDICARE

## 2020-05-15 ENCOUNTER — HOSPITAL ENCOUNTER (OUTPATIENT)
Dept: INFUSION THERAPY | Age: 70
Discharge: HOME | End: 2020-05-15
Payer: MEDICARE

## 2020-05-15 VITALS
BODY MASS INDEX: 23.76 KG/M2 | DIASTOLIC BLOOD PRESSURE: 69 MMHG | SYSTOLIC BLOOD PRESSURE: 103 MMHG | HEIGHT: 72 IN | HEART RATE: 108 BPM | WEIGHT: 175.4 LBS | TEMPERATURE: 96.9 F | OXYGEN SATURATION: 97 %

## 2020-05-15 VITALS
RESPIRATION RATE: 18 BRPM | TEMPERATURE: 97.2 F | SYSTOLIC BLOOD PRESSURE: 127 MMHG | HEART RATE: 86 BPM | DIASTOLIC BLOOD PRESSURE: 79 MMHG

## 2020-05-15 DIAGNOSIS — C34.90 ADENOCARCINOMA OF LUNG, UNSPECIFIED LATERALITY (HCC): Primary | ICD-10-CM

## 2020-05-15 DIAGNOSIS — C34.81 MALIGNANT NEOPLASM OF OVERLAPPING SITES OF RIGHT BRONCHUS AND LUNG (HCC): ICD-10-CM

## 2020-05-15 DIAGNOSIS — Z51.11 ENCOUNTER FOR ANTINEOPLASTIC CHEMOTHERAPY: ICD-10-CM

## 2020-05-15 PROCEDURE — 96361 HYDRATE IV INFUSION ADD-ON: CPT

## 2020-05-15 PROCEDURE — G8427 DOCREV CUR MEDS BY ELIG CLIN: HCPCS | Performed by: INTERNAL MEDICINE

## 2020-05-15 PROCEDURE — 1036F TOBACCO NON-USER: CPT | Performed by: INTERNAL MEDICINE

## 2020-05-15 PROCEDURE — 6360000002 HC RX W HCPCS: Performed by: NURSE PRACTITIONER

## 2020-05-15 PROCEDURE — 96375 TX/PRO/DX INJ NEW DRUG ADDON: CPT

## 2020-05-15 PROCEDURE — 96413 CHEMO IV INFUSION 1 HR: CPT

## 2020-05-15 PROCEDURE — G8420 CALC BMI NORM PARAMETERS: HCPCS | Performed by: INTERNAL MEDICINE

## 2020-05-15 PROCEDURE — 6360000002 HC RX W HCPCS: Performed by: INTERNAL MEDICINE

## 2020-05-15 PROCEDURE — 1123F ACP DISCUSS/DSCN MKR DOCD: CPT | Performed by: INTERNAL MEDICINE

## 2020-05-15 PROCEDURE — 99214 OFFICE O/P EST MOD 30 MIN: CPT | Performed by: INTERNAL MEDICINE

## 2020-05-15 PROCEDURE — 96366 THER/PROPH/DIAG IV INF ADDON: CPT

## 2020-05-15 PROCEDURE — 96417 CHEMO IV INFUS EACH ADDL SEQ: CPT

## 2020-05-15 PROCEDURE — 2580000003 HC RX 258: Performed by: INTERNAL MEDICINE

## 2020-05-15 PROCEDURE — 96365 THER/PROPH/DIAG IV INF INIT: CPT

## 2020-05-15 PROCEDURE — 2580000003 HC RX 258: Performed by: NURSE PRACTITIONER

## 2020-05-15 PROCEDURE — 4040F PNEUMOC VAC/ADMIN/RCVD: CPT | Performed by: INTERNAL MEDICINE

## 2020-05-15 PROCEDURE — 3017F COLORECTAL CA SCREEN DOC REV: CPT | Performed by: INTERNAL MEDICINE

## 2020-05-15 PROCEDURE — 96372 THER/PROPH/DIAG INJ SC/IM: CPT

## 2020-05-15 PROCEDURE — 96377 APPLICATON ON-BODY INJECTOR: CPT

## 2020-05-15 RX ORDER — 0.9 % SODIUM CHLORIDE 0.9 %
1000 INTRAVENOUS SOLUTION INTRAVENOUS ONCE
Status: COMPLETED | OUTPATIENT
Start: 2020-05-15 | End: 2020-05-15

## 2020-05-15 RX ORDER — PALONOSETRON HYDROCHLORIDE 0.05 MG/ML
0.25 INJECTION, SOLUTION INTRAVENOUS ONCE
Status: COMPLETED | OUTPATIENT
Start: 2020-05-15 | End: 2020-05-15

## 2020-05-15 RX ORDER — EPINEPHRINE 1 MG/ML
0.3 INJECTION, SOLUTION, CONCENTRATE INTRAVENOUS PRN
Status: CANCELLED | OUTPATIENT
Start: 2020-05-15

## 2020-05-15 RX ORDER — SODIUM CHLORIDE 9 MG/ML
INJECTION, SOLUTION INTRAVENOUS CONTINUOUS
Status: CANCELLED | OUTPATIENT
Start: 2020-05-15

## 2020-05-15 RX ORDER — CYANOCOBALAMIN 1000 UG/ML
1000 INJECTION INTRAMUSCULAR; SUBCUTANEOUS ONCE
Status: COMPLETED | OUTPATIENT
Start: 2020-05-15 | End: 2020-05-15

## 2020-05-15 RX ORDER — SODIUM CHLORIDE 0.9 % (FLUSH) 0.9 %
10 SYRINGE (ML) INJECTION PRN
Status: DISCONTINUED | OUTPATIENT
Start: 2020-05-15 | End: 2020-05-16 | Stop reason: HOSPADM

## 2020-05-15 RX ORDER — SODIUM CHLORIDE 0.9 % (FLUSH) 0.9 %
5 SYRINGE (ML) INJECTION PRN
Status: CANCELLED | OUTPATIENT
Start: 2020-05-15

## 2020-05-15 RX ORDER — MAGNESIUM SULFATE IN WATER 40 MG/ML
2 INJECTION, SOLUTION INTRAVENOUS ONCE
Status: COMPLETED | OUTPATIENT
Start: 2020-05-15 | End: 2020-05-15

## 2020-05-15 RX ORDER — METHYLPREDNISOLONE SODIUM SUCCINATE 125 MG/2ML
125 INJECTION, POWDER, LYOPHILIZED, FOR SOLUTION INTRAMUSCULAR; INTRAVENOUS ONCE
Status: CANCELLED | OUTPATIENT
Start: 2020-05-15

## 2020-05-15 RX ORDER — MAGNESIUM SULFATE IN WATER 40 MG/ML
2 INJECTION, SOLUTION INTRAVENOUS PRN
Status: CANCELLED
Start: 2020-05-15

## 2020-05-15 RX ORDER — MEPERIDINE HYDROCHLORIDE 25 MG/ML
12.5 INJECTION INTRAMUSCULAR; INTRAVENOUS; SUBCUTANEOUS ONCE
Status: CANCELLED | OUTPATIENT
Start: 2020-05-15

## 2020-05-15 RX ORDER — DEXAMETHASONE SODIUM PHOSPHATE 10 MG/ML
10 INJECTION INTRAMUSCULAR; INTRAVENOUS ONCE
Status: COMPLETED | OUTPATIENT
Start: 2020-05-15 | End: 2020-05-15

## 2020-05-15 RX ORDER — DIPHENHYDRAMINE HYDROCHLORIDE 50 MG/ML
50 INJECTION INTRAMUSCULAR; INTRAVENOUS ONCE
Status: CANCELLED | OUTPATIENT
Start: 2020-05-15

## 2020-05-15 RX ORDER — SODIUM CHLORIDE 9 MG/ML
INJECTION, SOLUTION INTRAVENOUS
Status: DISCONTINUED
Start: 2020-05-15 | End: 2020-05-16 | Stop reason: HOSPADM

## 2020-05-15 RX ORDER — HEPARIN SODIUM (PORCINE) LOCK FLUSH IV SOLN 100 UNIT/ML 100 UNIT/ML
500 SOLUTION INTRAVENOUS PRN
Status: CANCELLED | OUTPATIENT
Start: 2020-05-15

## 2020-05-15 RX ADMIN — SODIUM CHLORIDE 1000 ML: 9 INJECTION, SOLUTION INTRAVENOUS at 11:31

## 2020-05-15 RX ADMIN — DEXAMETHASONE SODIUM PHOSPHATE 10 MG: 10 INJECTION INTRAMUSCULAR; INTRAVENOUS at 11:42

## 2020-05-15 RX ADMIN — MAGNESIUM SULFATE HEPTAHYDRATE 2 G: 40 INJECTION, SOLUTION INTRAVENOUS at 13:40

## 2020-05-15 RX ADMIN — CARBOPLATIN 450 MG: 10 INJECTION, SOLUTION INTRAVENOUS at 13:40

## 2020-05-15 RX ADMIN — SODIUM CHLORIDE, PRESERVATIVE FREE 10 ML: 5 INJECTION INTRAVENOUS at 11:42

## 2020-05-15 RX ADMIN — PEGFILGRASTIM 6 MG: KIT SUBCUTANEOUS at 16:09

## 2020-05-15 RX ADMIN — SODIUM CHLORIDE 1000 MG: 9 INJECTION, SOLUTION INTRAVENOUS at 13:16

## 2020-05-15 RX ADMIN — SODIUM CHLORIDE 200 MG: 9 INJECTION, SOLUTION INTRAVENOUS at 12:22

## 2020-05-15 RX ADMIN — SODIUM CHLORIDE, PRESERVATIVE FREE 10 ML: 5 INJECTION INTRAVENOUS at 11:31

## 2020-05-15 RX ADMIN — CYANOCOBALAMIN 1000 MCG: 1000 INJECTION, SOLUTION INTRAMUSCULAR; SUBCUTANEOUS at 11:50

## 2020-05-15 RX ADMIN — FOSAPREPITANT 150 MG: 150 INJECTION, POWDER, LYOPHILIZED, FOR SOLUTION INTRAVENOUS at 11:48

## 2020-05-15 RX ADMIN — PALONOSETRON 0.25 MG: 0.25 INJECTION, SOLUTION INTRAVENOUS at 11:37

## 2020-05-21 ENCOUNTER — HOSPITAL ENCOUNTER (OUTPATIENT)
Dept: INFUSION THERAPY | Age: 70
Discharge: HOME | End: 2020-05-21
Payer: MEDICARE

## 2020-05-21 DIAGNOSIS — C34.90 NON-SMALL CELL CARCINOMA OF LUNG (HCC): ICD-10-CM

## 2020-05-21 DIAGNOSIS — C34.81 MALIGNANT NEOPLASM OF OVERLAPPING SITES OF RIGHT BRONCHUS AND LUNG (HCC): ICD-10-CM

## 2020-05-21 DIAGNOSIS — C34.90 ADENOCARCINOMA OF LUNG, UNSPECIFIED LATERALITY (HCC): ICD-10-CM

## 2020-05-21 LAB
ALBUMIN SERPL-MCNC: 3.6 G/DL (ref 3.5–5.2)
ALP BLD-CCNC: 106 U/L (ref 40–129)
ALT SERPL-CCNC: 14 U/L (ref 0–40)
ANION GAP SERPL CALCULATED.3IONS-SCNC: 14 MMOL/L (ref 7–16)
ANISOCYTOSIS: ABNORMAL
AST SERPL-CCNC: 27 U/L (ref 0–39)
BASOPHILS ABSOLUTE: 0 E9/L (ref 0–0.2)
BASOPHILS RELATIVE PERCENT: 0.4 % (ref 0–2)
BILIRUB SERPL-MCNC: 0.4 MG/DL (ref 0–1.2)
BUN BLDV-MCNC: 25 MG/DL (ref 8–23)
CALCIUM SERPL-MCNC: 9.5 MG/DL (ref 8.6–10.2)
CHLORIDE BLD-SCNC: 103 MMOL/L (ref 98–107)
CO2: 24 MMOL/L (ref 22–29)
CREAT SERPL-MCNC: 0.9 MG/DL (ref 0.7–1.2)
EOSINOPHILS ABSOLUTE: 0 E9/L (ref 0.05–0.5)
EOSINOPHILS RELATIVE PERCENT: 0 % (ref 0–6)
GFR AFRICAN AMERICAN: >60
GFR NON-AFRICAN AMERICAN: >60 ML/MIN/1.73
GLUCOSE BLD-MCNC: 114 MG/DL (ref 74–99)
HCT VFR BLD CALC: 30.5 % (ref 37–54)
HEMOGLOBIN: 9.9 G/DL (ref 12.5–16.5)
LYMPHOCYTES ABSOLUTE: 1.06 E9/L (ref 1.5–4)
LYMPHOCYTES RELATIVE PERCENT: 21.7 % (ref 20–42)
MAGNESIUM: 2 MG/DL (ref 1.6–2.6)
MCH RBC QN AUTO: 29.5 PG (ref 26–35)
MCHC RBC AUTO-ENTMCNC: 32.5 % (ref 32–34.5)
MCV RBC AUTO: 90.8 FL (ref 80–99.9)
MONOCYTES ABSOLUTE: 0.19 E9/L (ref 0.1–0.95)
MONOCYTES RELATIVE PERCENT: 3.5 % (ref 2–12)
NEUTROPHILS ABSOLUTE: 3.6 E9/L (ref 1.8–7.3)
NEUTROPHILS RELATIVE PERCENT: 74.8 % (ref 43–80)
OVALOCYTES: ABNORMAL
PDW BLD-RTO: 15.4 FL (ref 11.5–15)
PLATELET # BLD: 258 E9/L (ref 130–450)
PMV BLD AUTO: 9.6 FL (ref 7–12)
POIKILOCYTES: ABNORMAL
POLYCHROMASIA: ABNORMAL
POTASSIUM SERPL-SCNC: 4 MMOL/L (ref 3.5–5)
RBC # BLD: 3.36 E12/L (ref 3.8–5.8)
SODIUM BLD-SCNC: 141 MMOL/L (ref 132–146)
TARGET CELLS: ABNORMAL
TOTAL PROTEIN: 7 G/DL (ref 6.4–8.3)
WBC # BLD: 4.8 E9/L (ref 4.5–11.5)

## 2020-05-21 PROCEDURE — 36415 COLL VENOUS BLD VENIPUNCTURE: CPT

## 2020-05-21 PROCEDURE — 85025 COMPLETE CBC W/AUTO DIFF WBC: CPT

## 2020-05-21 PROCEDURE — 83735 ASSAY OF MAGNESIUM: CPT

## 2020-05-21 PROCEDURE — 80053 COMPREHEN METABOLIC PANEL: CPT

## 2020-05-21 RX ORDER — SODIUM CHLORIDE 0.9 % (FLUSH) 0.9 %
10 SYRINGE (ML) INJECTION PRN
Status: CANCELLED | OUTPATIENT
Start: 2020-05-22

## 2020-05-21 RX ORDER — HEPARIN SODIUM (PORCINE) LOCK FLUSH IV SOLN 100 UNIT/ML 100 UNIT/ML
500 SOLUTION INTRAVENOUS PRN
Status: CANCELLED | OUTPATIENT
Start: 2020-05-22

## 2020-05-21 RX ORDER — 0.9 % SODIUM CHLORIDE 0.9 %
1000 INTRAVENOUS SOLUTION INTRAVENOUS ONCE
Status: CANCELLED | OUTPATIENT
Start: 2020-05-22

## 2020-05-22 ENCOUNTER — HOSPITAL ENCOUNTER (OUTPATIENT)
Dept: INFUSION THERAPY | Age: 70
Discharge: HOME | End: 2020-05-22
Payer: MEDICARE

## 2020-05-22 VITALS
TEMPERATURE: 98.3 F | DIASTOLIC BLOOD PRESSURE: 59 MMHG | HEART RATE: 75 BPM | SYSTOLIC BLOOD PRESSURE: 114 MMHG | RESPIRATION RATE: 18 BRPM

## 2020-05-22 DIAGNOSIS — Z51.11 ENCOUNTER FOR ANTINEOPLASTIC CHEMOTHERAPY: ICD-10-CM

## 2020-05-22 DIAGNOSIS — C34.81 MALIGNANT NEOPLASM OF OVERLAPPING SITES OF RIGHT BRONCHUS AND LUNG (HCC): Primary | ICD-10-CM

## 2020-05-22 PROCEDURE — 96360 HYDRATION IV INFUSION INIT: CPT

## 2020-05-22 PROCEDURE — 2580000003 HC RX 258: Performed by: NURSE PRACTITIONER

## 2020-05-22 RX ORDER — 0.9 % SODIUM CHLORIDE 0.9 %
1000 INTRAVENOUS SOLUTION INTRAVENOUS ONCE
Status: CANCELLED | OUTPATIENT
Start: 2020-05-22

## 2020-05-22 RX ORDER — HEPARIN SODIUM (PORCINE) LOCK FLUSH IV SOLN 100 UNIT/ML 100 UNIT/ML
500 SOLUTION INTRAVENOUS PRN
Status: CANCELLED | OUTPATIENT
Start: 2020-05-22

## 2020-05-22 RX ORDER — 0.9 % SODIUM CHLORIDE 0.9 %
1000 INTRAVENOUS SOLUTION INTRAVENOUS ONCE
Status: COMPLETED | OUTPATIENT
Start: 2020-05-22 | End: 2020-05-22

## 2020-05-22 RX ORDER — SODIUM CHLORIDE 0.9 % (FLUSH) 0.9 %
10 SYRINGE (ML) INJECTION PRN
Status: CANCELLED | OUTPATIENT
Start: 2020-05-22

## 2020-05-22 RX ORDER — SODIUM CHLORIDE 0.9 % (FLUSH) 0.9 %
10 SYRINGE (ML) INJECTION PRN
Status: DISCONTINUED | OUTPATIENT
Start: 2020-05-22 | End: 2020-05-23 | Stop reason: HOSPADM

## 2020-05-22 RX ADMIN — SODIUM CHLORIDE 1000 ML: 9 INJECTION, SOLUTION INTRAVENOUS at 11:00

## 2020-05-22 RX ADMIN — SODIUM CHLORIDE, PRESERVATIVE FREE 10 ML: 5 INJECTION INTRAVENOUS at 11:00

## 2020-05-29 ENCOUNTER — TELEPHONE (OUTPATIENT)
Dept: PULMONOLOGY | Age: 70
End: 2020-05-29

## 2020-05-29 ENCOUNTER — TELEPHONE (OUTPATIENT)
Dept: FAMILY MEDICINE CLINIC | Age: 70
End: 2020-05-29

## 2020-05-29 NOTE — TELEPHONE ENCOUNTER
I called patient, he is still not willing to go to ER for low BP. Not having sob, cp, palpitations, lightheadedness, dizziness, abdomen pain, n/v.  Pleur-x could not changed AM b/c his BP was too low. Lives with girlfriend who stays with him. Is not alone. Drinking water at home, took extra ensure today. Appetite has been poor lately. He is aware if BP drops too much, might faint, and in the worst scenario \"may not wake up. \"    He does not want to come to ER. He will not take his BP meds for today, hydrate, drink ensure and eat more.

## 2020-05-29 NOTE — TELEPHONE ENCOUNTER
Received a call from Micha Espinoza from [facility] and he stated that he did not drain the patient's pleurax do to his blood pressure being 60/30.  He also stated that he has not been drained since Tuesday

## 2020-05-30 ENCOUNTER — HOSPITAL ENCOUNTER (EMERGENCY)
Age: 70
Discharge: HOME | End: 2020-05-30
Attending: EMERGENCY MEDICINE
Payer: MEDICARE

## 2020-05-30 ENCOUNTER — APPOINTMENT (OUTPATIENT)
Dept: GENERAL RADIOLOGY | Age: 70
End: 2020-05-30
Payer: MEDICARE

## 2020-05-30 VITALS
SYSTOLIC BLOOD PRESSURE: 130 MMHG | HEIGHT: 72 IN | BODY MASS INDEX: 23.57 KG/M2 | OXYGEN SATURATION: 98 % | DIASTOLIC BLOOD PRESSURE: 80 MMHG | TEMPERATURE: 98.1 F | HEART RATE: 100 BPM | RESPIRATION RATE: 16 BRPM | WEIGHT: 174 LBS

## 2020-05-30 LAB
ALBUMIN SERPL-MCNC: 3.5 G/DL (ref 3.5–5.2)
ALP BLD-CCNC: 102 U/L (ref 40–129)
ALT SERPL-CCNC: 17 U/L (ref 0–40)
ANION GAP SERPL CALCULATED.3IONS-SCNC: 15 MMOL/L (ref 7–16)
AST SERPL-CCNC: 24 U/L (ref 0–39)
BASOPHILS ABSOLUTE: 0.02 E9/L (ref 0–0.2)
BASOPHILS RELATIVE PERCENT: 0.1 % (ref 0–2)
BILIRUB SERPL-MCNC: <0.2 MG/DL (ref 0–1.2)
BILIRUBIN URINE: NEGATIVE
BLOOD, URINE: NEGATIVE
BUN BLDV-MCNC: 13 MG/DL (ref 8–23)
CALCIUM SERPL-MCNC: 9.3 MG/DL (ref 8.6–10.2)
CHLORIDE BLD-SCNC: 96 MMOL/L (ref 98–107)
CLARITY: CLEAR
CO2: 24 MMOL/L (ref 22–29)
COLOR: YELLOW
CREAT SERPL-MCNC: 1.2 MG/DL (ref 0.7–1.2)
EOSINOPHILS ABSOLUTE: 0.02 E9/L (ref 0.05–0.5)
EOSINOPHILS RELATIVE PERCENT: 0.1 % (ref 0–6)
GFR AFRICAN AMERICAN: >60
GFR NON-AFRICAN AMERICAN: >60 ML/MIN/1.73
GLUCOSE BLD-MCNC: 109 MG/DL (ref 74–99)
GLUCOSE URINE: NEGATIVE MG/DL
HCT VFR BLD CALC: 28.2 % (ref 37–54)
HEMOGLOBIN: 9.1 G/DL (ref 12.5–16.5)
IMMATURE GRANULOCYTES #: 0.19 E9/L
IMMATURE GRANULOCYTES %: 1 % (ref 0–5)
KETONES, URINE: NEGATIVE MG/DL
LACTIC ACID: 1.1 MMOL/L (ref 0.5–2.2)
LEUKOCYTE ESTERASE, URINE: NEGATIVE
LYMPHOCYTES ABSOLUTE: 3.76 E9/L (ref 1.5–4)
LYMPHOCYTES RELATIVE PERCENT: 19 % (ref 20–42)
MAGNESIUM: 1.8 MG/DL (ref 1.6–2.6)
MCH RBC QN AUTO: 29.5 PG (ref 26–35)
MCHC RBC AUTO-ENTMCNC: 32.3 % (ref 32–34.5)
MCV RBC AUTO: 91.6 FL (ref 80–99.9)
MONOCYTES ABSOLUTE: 1.23 E9/L (ref 0.1–0.95)
MONOCYTES RELATIVE PERCENT: 6.2 % (ref 2–12)
NEUTROPHILS ABSOLUTE: 14.6 E9/L (ref 1.8–7.3)
NEUTROPHILS RELATIVE PERCENT: 73.6 % (ref 43–80)
NITRITE, URINE: NEGATIVE
PDW BLD-RTO: 15.5 FL (ref 11.5–15)
PH UA: 7 (ref 5–9)
PLATELET # BLD: 178 E9/L (ref 130–450)
PMV BLD AUTO: 10.2 FL (ref 7–12)
POTASSIUM REFLEX MAGNESIUM: 3.4 MMOL/L (ref 3.5–5)
PROTEIN UA: NEGATIVE MG/DL
RBC # BLD: 3.08 E12/L (ref 3.8–5.8)
SODIUM BLD-SCNC: 135 MMOL/L (ref 132–146)
SPECIFIC GRAVITY UA: 1.01 (ref 1–1.03)
TOTAL PROTEIN: 7 G/DL (ref 6.4–8.3)
UROBILINOGEN, URINE: 0.2 E.U./DL
WBC # BLD: 19.8 E9/L (ref 4.5–11.5)

## 2020-05-30 PROCEDURE — 2580000003 HC RX 258: Performed by: PHYSICIAN ASSISTANT

## 2020-05-30 PROCEDURE — 71045 X-RAY EXAM CHEST 1 VIEW: CPT

## 2020-05-30 PROCEDURE — 81003 URINALYSIS AUTO W/O SCOPE: CPT

## 2020-05-30 PROCEDURE — 85025 COMPLETE CBC W/AUTO DIFF WBC: CPT

## 2020-05-30 PROCEDURE — 80053 COMPREHEN METABOLIC PANEL: CPT

## 2020-05-30 PROCEDURE — 83735 ASSAY OF MAGNESIUM: CPT

## 2020-05-30 PROCEDURE — 83605 ASSAY OF LACTIC ACID: CPT

## 2020-05-30 PROCEDURE — 87040 BLOOD CULTURE FOR BACTERIA: CPT

## 2020-05-30 PROCEDURE — 99284 EMERGENCY DEPT VISIT MOD MDM: CPT

## 2020-05-30 RX ORDER — 0.9 % SODIUM CHLORIDE 0.9 %
1000 INTRAVENOUS SOLUTION INTRAVENOUS ONCE
Status: COMPLETED | OUTPATIENT
Start: 2020-05-30 | End: 2020-05-30

## 2020-05-30 RX ADMIN — SODIUM CHLORIDE 1000 ML: 9 INJECTION, SOLUTION INTRAVENOUS at 11:46

## 2020-05-30 NOTE — ED PROVIDER NOTES
Neutrophils Absolute 14.60 (H) 1.80 - 7.30 E9/L    Immature Granulocytes # 0.19 E9/L    Lymphocytes Absolute 3.76 1.50 - 4.00 E9/L    Monocytes Absolute 1.23 (H) 0.10 - 0.95 E9/L    Eosinophils Absolute 0.02 (L) 0.05 - 0.50 E9/L    Basophils Absolute 0.02 0.00 - 0.20 E9/L   Comprehensive Metabolic Panel w/ Reflex to MG   Result Value Ref Range    Sodium 135 132 - 146 mmol/L    Potassium reflex Magnesium 3.4 (L) 3.5 - 5.0 mmol/L    Chloride 96 (L) 98 - 107 mmol/L    CO2 24 22 - 29 mmol/L    Anion Gap 15 7 - 16 mmol/L    Glucose 109 (H) 74 - 99 mg/dL    BUN 13 8 - 23 mg/dL    CREATININE 1.2 0.7 - 1.2 mg/dL    GFR Non-African American >60 >=60 mL/min/1.73    GFR African American >60     Calcium 9.3 8.6 - 10.2 mg/dL    Total Protein 7.0 6.4 - 8.3 g/dL    Alb 3.5 3.5 - 5.2 g/dL    Total Bilirubin <0.2 0.0 - 1.2 mg/dL    Alkaline Phosphatase 102 40 - 129 U/L    ALT 17 0 - 40 U/L    AST 24 0 - 39 U/L   Lactic Acid, Plasma   Result Value Ref Range    Lactic Acid 1.1 0.5 - 2.2 mmol/L   Magnesium   Result Value Ref Range    Magnesium 1.8 1.6 - 2.6 mg/dL   Urinalysis, reflex to microscopic   Result Value Ref Range    Color, UA Yellow Straw/Yellow    Clarity, UA Clear Clear    Glucose, Ur Negative Negative mg/dL    Bilirubin Urine Negative Negative    Ketones, Urine Negative Negative mg/dL    Specific Gravity, UA 1.010 1.005 - 1.030    Blood, Urine Negative Negative    pH, UA 7.0 5.0 - 9.0    Protein, UA Negative Negative mg/dL    Urobilinogen, Urine 0.2 <2.0 E.U./dL    Nitrite, Urine Negative Negative    Leukocyte Esterase, Urine Negative Negative       RADIOLOGY:  Interpreted by Radiologist.  XR CHEST PORTABLE   Final Result   Persistent right-sided pneumothorax with a slight decrease since the   study earlier today at 1219 hours. ALERT:  THIS IS AN ABNORMAL REPORT-persistent right-sided pneumothorax   without tension component.          Note: The emergency department physician assistant was contacted   telephonically by myself at the time of this dictation to communicate   findings      XR CHEST PORTABLE   Final Result      Right-sided Pleurx catheter with tip in the right apex      Moderate to large right-sided pleural effusion      Right sided large pneumothorax with complete collapse of the right   lung. ALERT:  THIS IS AN ABNORMAL REPORT             ------------------------- NURSING NOTES AND VITALS REVIEWED ---------------------------   The nursing notes within the ED encounter and vital signs as below have been reviewed. /80   Pulse 100   Temp 98.1 °F (36.7 °C)   Resp 16   Ht 6' (1.829 m)   Wt 174 lb (78.9 kg)   SpO2 98%   BMI 23.60 kg/m²   Oxygen Saturation Interpretation: Normal      ---------------------------------------------------PHYSICAL EXAM--------------------------------------      Constitutional/General: Alert and oriented x3, well appearing, non toxic in NAD  Head: Normocephalic and atraumatic  Eyes: PERRL, EOMI  Mouth: Oropharynx clear, handling secretions, no trismus, dry mucous membranes  Neck: Supple, full ROM,   Pulmonary: Lungs clear to auscultation bilaterally, no wheezes, rales, or rhonchi. Not in respiratory distress, pleurex catheter placed in right chest  Cardiovascular:  Regular rate and rhythm, no murmurs, gallops, or rubs. 2+ distal pulses  Abdomen: Soft, non tender, non distended,   Extremities: Moves all extremities x 4. Warm and well perfused  Skin: warm and dry without rash  Neurologic: GCS 15,  Psych: Normal Affect      ------------------------------ ED COURSE/MEDICAL DECISION MAKING----------------------  Medications   0.9 % sodium chloride bolus (0 mLs Intravenous Stopped 5/30/20 1148)       ED COURSE:  ED Course as of May 30 1808   Sat May 30, 2020   1200 Reassessed patient. Remains stable. [MS]   2840 Baseline for patient. Hemoglobin Quant(!): 9.1 [MS]   1300 Reassessed patient. Remains stable. Reports symptoms have improved after 1L normal saline.  Reports he is

## 2020-06-03 ENCOUNTER — OFFICE VISIT (OUTPATIENT)
Dept: FAMILY MEDICINE CLINIC | Age: 70
End: 2020-06-03
Payer: MEDICARE

## 2020-06-03 VITALS
WEIGHT: 166 LBS | HEIGHT: 72 IN | RESPIRATION RATE: 26 BRPM | OXYGEN SATURATION: 97 % | SYSTOLIC BLOOD PRESSURE: 129 MMHG | BODY MASS INDEX: 22.48 KG/M2 | HEART RATE: 124 BPM | TEMPERATURE: 97.9 F | DIASTOLIC BLOOD PRESSURE: 84 MMHG

## 2020-06-03 PROCEDURE — 1123F ACP DISCUSS/DSCN MKR DOCD: CPT | Performed by: FAMILY MEDICINE

## 2020-06-03 PROCEDURE — 1036F TOBACCO NON-USER: CPT | Performed by: FAMILY MEDICINE

## 2020-06-03 PROCEDURE — 99212 OFFICE O/P EST SF 10 MIN: CPT | Performed by: FAMILY MEDICINE

## 2020-06-03 PROCEDURE — 99213 OFFICE O/P EST LOW 20 MIN: CPT | Performed by: FAMILY MEDICINE

## 2020-06-03 PROCEDURE — G8427 DOCREV CUR MEDS BY ELIG CLIN: HCPCS | Performed by: FAMILY MEDICINE

## 2020-06-03 PROCEDURE — 3017F COLORECTAL CA SCREEN DOC REV: CPT | Performed by: FAMILY MEDICINE

## 2020-06-03 PROCEDURE — 4040F PNEUMOC VAC/ADMIN/RCVD: CPT | Performed by: FAMILY MEDICINE

## 2020-06-03 PROCEDURE — G8420 CALC BMI NORM PARAMETERS: HCPCS | Performed by: FAMILY MEDICINE

## 2020-06-03 RX ORDER — ALBUTEROL SULFATE 90 UG/1
2 AEROSOL, METERED RESPIRATORY (INHALATION) 4 TIMES DAILY
Qty: 1 INHALER | Refills: 3 | Status: SHIPPED | OUTPATIENT
Start: 2020-06-03

## 2020-06-03 RX ORDER — IPRATROPIUM BROMIDE AND ALBUTEROL SULFATE 2.5; .5 MG/3ML; MG/3ML
1 SOLUTION RESPIRATORY (INHALATION) EVERY 4 HOURS PRN
Qty: 360 ML | Refills: 5 | Status: SHIPPED
Start: 2020-06-03 | End: 2020-07-07 | Stop reason: SDUPTHER

## 2020-06-03 RX ORDER — TRAZODONE HYDROCHLORIDE 50 MG/1
50 TABLET ORAL NIGHTLY
Qty: 90 TABLET | Refills: 1 | Status: SHIPPED | OUTPATIENT
Start: 2020-06-03

## 2020-06-03 RX ORDER — PANTOPRAZOLE SODIUM 40 MG/1
40 TABLET, DELAYED RELEASE ORAL DAILY
Qty: 90 TABLET | Refills: 3 | Status: SHIPPED | OUTPATIENT
Start: 2020-06-03

## 2020-06-04 ENCOUNTER — HOSPITAL ENCOUNTER (OUTPATIENT)
Dept: INFUSION THERAPY | Age: 70
Discharge: HOME | End: 2020-06-04
Payer: MEDICARE

## 2020-06-04 DIAGNOSIS — C34.90 ADENOCARCINOMA OF LUNG, UNSPECIFIED LATERALITY (HCC): ICD-10-CM

## 2020-06-04 DIAGNOSIS — C34.81 MALIGNANT NEOPLASM OF OVERLAPPING SITES OF RIGHT BRONCHUS AND LUNG (HCC): ICD-10-CM

## 2020-06-04 DIAGNOSIS — C34.90 NON-SMALL CELL CARCINOMA OF LUNG (HCC): ICD-10-CM

## 2020-06-04 LAB
ALBUMIN SERPL-MCNC: 3.5 G/DL (ref 3.5–5.2)
ALP BLD-CCNC: 91 U/L (ref 40–129)
ALT SERPL-CCNC: 10 U/L (ref 0–40)
ANION GAP SERPL CALCULATED.3IONS-SCNC: 14 MMOL/L (ref 7–16)
AST SERPL-CCNC: 18 U/L (ref 0–39)
BASOPHILS ABSOLUTE: 0.02 E9/L (ref 0–0.2)
BASOPHILS RELATIVE PERCENT: 0.1 % (ref 0–2)
BILIRUB SERPL-MCNC: 0.3 MG/DL (ref 0–1.2)
BLOOD CULTURE, ROUTINE: NORMAL
BUN BLDV-MCNC: 9 MG/DL (ref 8–23)
CALCIUM SERPL-MCNC: 9.3 MG/DL (ref 8.6–10.2)
CHLORIDE BLD-SCNC: 98 MMOL/L (ref 98–107)
CO2: 26 MMOL/L (ref 22–29)
CREAT SERPL-MCNC: 1 MG/DL (ref 0.7–1.2)
CULTURE, BLOOD 2: NORMAL
EOSINOPHILS ABSOLUTE: 0 E9/L (ref 0.05–0.5)
EOSINOPHILS RELATIVE PERCENT: 0 % (ref 0–6)
GFR AFRICAN AMERICAN: >60
GFR NON-AFRICAN AMERICAN: >60 ML/MIN/1.73
GLUCOSE BLD-MCNC: 130 MG/DL (ref 74–99)
HCT VFR BLD CALC: 28.3 % (ref 37–54)
HEMOGLOBIN: 9.1 G/DL (ref 12.5–16.5)
IMMATURE GRANULOCYTES #: 0.08 E9/L
IMMATURE GRANULOCYTES %: 0.6 % (ref 0–5)
LYMPHOCYTES ABSOLUTE: 2.79 E9/L (ref 1.5–4)
LYMPHOCYTES RELATIVE PERCENT: 19.3 % (ref 20–42)
MAGNESIUM: 1.5 MG/DL (ref 1.6–2.6)
MCH RBC QN AUTO: 29.4 PG (ref 26–35)
MCHC RBC AUTO-ENTMCNC: 32.2 % (ref 32–34.5)
MCV RBC AUTO: 91.3 FL (ref 80–99.9)
MONOCYTES ABSOLUTE: 1.17 E9/L (ref 0.1–0.95)
MONOCYTES RELATIVE PERCENT: 8.1 % (ref 2–12)
NEUTROPHILS ABSOLUTE: 10.37 E9/L (ref 1.8–7.3)
NEUTROPHILS RELATIVE PERCENT: 71.9 % (ref 43–80)
PDW BLD-RTO: 15.9 FL (ref 11.5–15)
PLATELET # BLD: 328 E9/L (ref 130–450)
PMV BLD AUTO: 9 FL (ref 7–12)
POTASSIUM SERPL-SCNC: 3.4 MMOL/L (ref 3.5–5)
RBC # BLD: 3.1 E12/L (ref 3.8–5.8)
SODIUM BLD-SCNC: 138 MMOL/L (ref 132–146)
TOTAL PROTEIN: 7.1 G/DL (ref 6.4–8.3)
WBC # BLD: 14.4 E9/L (ref 4.5–11.5)

## 2020-06-04 PROCEDURE — 85025 COMPLETE CBC W/AUTO DIFF WBC: CPT

## 2020-06-04 PROCEDURE — 36415 COLL VENOUS BLD VENIPUNCTURE: CPT

## 2020-06-04 PROCEDURE — 83735 ASSAY OF MAGNESIUM: CPT

## 2020-06-04 PROCEDURE — 80053 COMPREHEN METABOLIC PANEL: CPT

## 2020-06-05 ENCOUNTER — OFFICE VISIT (OUTPATIENT)
Dept: ONCOLOGY | Age: 70
End: 2020-06-05
Payer: MEDICARE

## 2020-06-05 ENCOUNTER — HOSPITAL ENCOUNTER (OUTPATIENT)
Dept: INFUSION THERAPY | Age: 70
Discharge: HOME | End: 2020-06-05
Payer: MEDICARE

## 2020-06-05 VITALS
SYSTOLIC BLOOD PRESSURE: 142 MMHG | RESPIRATION RATE: 18 BRPM | HEART RATE: 107 BPM | DIASTOLIC BLOOD PRESSURE: 85 MMHG | TEMPERATURE: 97 F

## 2020-06-05 VITALS
TEMPERATURE: 97.3 F | WEIGHT: 169.3 LBS | OXYGEN SATURATION: 95 % | DIASTOLIC BLOOD PRESSURE: 75 MMHG | SYSTOLIC BLOOD PRESSURE: 141 MMHG | HEART RATE: 111 BPM | BODY MASS INDEX: 22.93 KG/M2 | HEIGHT: 72 IN

## 2020-06-05 DIAGNOSIS — C34.81 MALIGNANT NEOPLASM OF OVERLAPPING SITES OF RIGHT BRONCHUS AND LUNG (HCC): ICD-10-CM

## 2020-06-05 DIAGNOSIS — C34.90 ADENOCARCINOMA OF LUNG, UNSPECIFIED LATERALITY (HCC): Primary | ICD-10-CM

## 2020-06-05 DIAGNOSIS — Z51.11 ENCOUNTER FOR ANTINEOPLASTIC CHEMOTHERAPY: ICD-10-CM

## 2020-06-05 PROCEDURE — 96413 CHEMO IV INFUSION 1 HR: CPT

## 2020-06-05 PROCEDURE — 96377 APPLICATON ON-BODY INJECTOR: CPT

## 2020-06-05 PROCEDURE — 96411 CHEMO IV PUSH ADDL DRUG: CPT

## 2020-06-05 PROCEDURE — 96368 THER/DIAG CONCURRENT INF: CPT

## 2020-06-05 PROCEDURE — 6360000002 HC RX W HCPCS: Performed by: INTERNAL MEDICINE

## 2020-06-05 PROCEDURE — 96375 TX/PRO/DX INJ NEW DRUG ADDON: CPT

## 2020-06-05 PROCEDURE — 2580000003 HC RX 258: Performed by: INTERNAL MEDICINE

## 2020-06-05 PROCEDURE — 96366 THER/PROPH/DIAG IV INF ADDON: CPT

## 2020-06-05 PROCEDURE — 99212 OFFICE O/P EST SF 10 MIN: CPT | Performed by: INTERNAL MEDICINE

## 2020-06-05 PROCEDURE — 96417 CHEMO IV INFUS EACH ADDL SEQ: CPT

## 2020-06-05 PROCEDURE — 96367 TX/PROPH/DG ADDL SEQ IV INF: CPT

## 2020-06-05 PROCEDURE — 6370000000 HC RX 637 (ALT 250 FOR IP): Performed by: INTERNAL MEDICINE

## 2020-06-05 PROCEDURE — 2580000003 HC RX 258

## 2020-06-05 RX ORDER — EPINEPHRINE 1 MG/ML
0.3 INJECTION, SOLUTION, CONCENTRATE INTRAVENOUS PRN
Status: CANCELLED | OUTPATIENT
Start: 2020-06-05

## 2020-06-05 RX ORDER — MAGNESIUM SULFATE IN WATER 40 MG/ML
2 INJECTION, SOLUTION INTRAVENOUS PRN
Status: CANCELLED
Start: 2020-06-05

## 2020-06-05 RX ORDER — PALONOSETRON HYDROCHLORIDE 0.05 MG/ML
0.25 INJECTION, SOLUTION INTRAVENOUS ONCE
Status: COMPLETED | OUTPATIENT
Start: 2020-06-05 | End: 2020-06-05

## 2020-06-05 RX ORDER — POTASSIUM CHLORIDE 20 MEQ/1
40 TABLET, EXTENDED RELEASE ORAL PRN
Status: CANCELLED
Start: 2020-06-05

## 2020-06-05 RX ORDER — DIPHENHYDRAMINE HYDROCHLORIDE 50 MG/ML
50 INJECTION INTRAMUSCULAR; INTRAVENOUS ONCE
Status: CANCELLED | OUTPATIENT
Start: 2020-06-05

## 2020-06-05 RX ORDER — SODIUM CHLORIDE 0.9 % (FLUSH) 0.9 %
5 SYRINGE (ML) INJECTION PRN
Status: CANCELLED | OUTPATIENT
Start: 2020-06-05

## 2020-06-05 RX ORDER — HEPARIN SODIUM (PORCINE) LOCK FLUSH IV SOLN 100 UNIT/ML 100 UNIT/ML
500 SOLUTION INTRAVENOUS PRN
Status: CANCELLED | OUTPATIENT
Start: 2020-06-05

## 2020-06-05 RX ORDER — MAGNESIUM SULFATE IN WATER 40 MG/ML
2 INJECTION, SOLUTION INTRAVENOUS PRN
Status: DISCONTINUED | OUTPATIENT
Start: 2020-06-05 | End: 2020-06-06 | Stop reason: HOSPADM

## 2020-06-05 RX ORDER — DEXAMETHASONE SODIUM PHOSPHATE 10 MG/ML
10 INJECTION INTRAMUSCULAR; INTRAVENOUS ONCE
Status: COMPLETED | OUTPATIENT
Start: 2020-06-05 | End: 2020-06-05

## 2020-06-05 RX ORDER — SODIUM CHLORIDE 0.9 % (FLUSH) 0.9 %
10 SYRINGE (ML) INJECTION PRN
Status: DISCONTINUED | OUTPATIENT
Start: 2020-06-05 | End: 2020-06-06 | Stop reason: HOSPADM

## 2020-06-05 RX ORDER — PALONOSETRON HYDROCHLORIDE 0.05 MG/ML
0.25 INJECTION, SOLUTION INTRAVENOUS ONCE
Status: CANCELLED | OUTPATIENT
Start: 2020-06-05

## 2020-06-05 RX ORDER — 0.9 % SODIUM CHLORIDE 0.9 %
1000 INTRAVENOUS SOLUTION INTRAVENOUS ONCE
Status: COMPLETED | OUTPATIENT
Start: 2020-06-05 | End: 2020-06-05

## 2020-06-05 RX ORDER — 0.9 % SODIUM CHLORIDE 0.9 %
1000 INTRAVENOUS SOLUTION INTRAVENOUS ONCE
Status: CANCELLED
Start: 2020-06-05

## 2020-06-05 RX ORDER — SODIUM CHLORIDE 9 MG/ML
INJECTION, SOLUTION INTRAVENOUS
Status: COMPLETED
Start: 2020-06-05 | End: 2020-06-05

## 2020-06-05 RX ORDER — SODIUM CHLORIDE 0.9 % (FLUSH) 0.9 %
10 SYRINGE (ML) INJECTION PRN
Status: CANCELLED | OUTPATIENT
Start: 2020-06-05

## 2020-06-05 RX ORDER — MEPERIDINE HYDROCHLORIDE 25 MG/ML
12.5 INJECTION INTRAMUSCULAR; INTRAVENOUS; SUBCUTANEOUS ONCE
Status: CANCELLED | OUTPATIENT
Start: 2020-06-05

## 2020-06-05 RX ORDER — SODIUM CHLORIDE 9 MG/ML
INJECTION, SOLUTION INTRAVENOUS CONTINUOUS
Status: CANCELLED | OUTPATIENT
Start: 2020-06-05

## 2020-06-05 RX ORDER — DEXAMETHASONE SODIUM PHOSPHATE 10 MG/ML
10 INJECTION, SOLUTION INTRAMUSCULAR; INTRAVENOUS ONCE
Status: CANCELLED | OUTPATIENT
Start: 2020-06-05

## 2020-06-05 RX ORDER — POTASSIUM CHLORIDE 20 MEQ/1
40 TABLET, EXTENDED RELEASE ORAL PRN
Status: DISCONTINUED | OUTPATIENT
Start: 2020-06-05 | End: 2020-06-06 | Stop reason: HOSPADM

## 2020-06-05 RX ORDER — METHYLPREDNISOLONE SODIUM SUCCINATE 125 MG/2ML
125 INJECTION, POWDER, LYOPHILIZED, FOR SOLUTION INTRAMUSCULAR; INTRAVENOUS ONCE
Status: CANCELLED | OUTPATIENT
Start: 2020-06-05

## 2020-06-05 RX ADMIN — SODIUM CHLORIDE 1000 MG: 9 INJECTION, SOLUTION INTRAVENOUS at 12:07

## 2020-06-05 RX ADMIN — PALONOSETRON 0.25 MG: 0.25 INJECTION, SOLUTION INTRAVENOUS at 10:51

## 2020-06-05 RX ADMIN — SODIUM CHLORIDE 1000 ML: 9 INJECTION, SOLUTION INTRAVENOUS at 10:47

## 2020-06-05 RX ADMIN — FOSAPREPITANT 150 MG: 150 INJECTION, POWDER, LYOPHILIZED, FOR SOLUTION INTRAVENOUS at 10:58

## 2020-06-05 RX ADMIN — CARBOPLATIN 450 MG: 10 INJECTION, SOLUTION INTRAVENOUS at 12:25

## 2020-06-05 RX ADMIN — Medication 1000 ML: at 10:47

## 2020-06-05 RX ADMIN — POTASSIUM CHLORIDE 40 MEQ: 1500 TABLET, EXTENDED RELEASE ORAL at 10:50

## 2020-06-05 RX ADMIN — SODIUM CHLORIDE, PRESERVATIVE FREE 10 ML: 5 INJECTION INTRAVENOUS at 10:53

## 2020-06-05 RX ADMIN — MAGNESIUM SULFATE HEPTAHYDRATE 2 G: 40 INJECTION, SOLUTION INTRAVENOUS at 12:26

## 2020-06-05 RX ADMIN — DEXAMETHASONE SODIUM PHOSPHATE 10 MG: 10 INJECTION INTRAMUSCULAR; INTRAVENOUS at 10:52

## 2020-06-05 RX ADMIN — PEGFILGRASTIM 6 MG: KIT SUBCUTANEOUS at 13:54

## 2020-06-05 RX ADMIN — SODIUM CHLORIDE 200 MG: 9 INJECTION, SOLUTION INTRAVENOUS at 11:29

## 2020-06-05 NOTE — PROGRESS NOTES
Diagnosis    NSCLC    Interim history    Pt denies any N/V/D. No pain    PE    HEAD NC AT  CHEST Clear BS BL  HEART S1S2 no m/r/g  ABD Soft ND NT  EXT no edema  NEURO A+Ox3    A/P    This is a 82 y/o gentleman with metastatic NSCLC. Chemo. CT scan in 3 weeks    F/u in 3 weeks    Labs reviewed. Ok to treat.

## 2020-06-18 ENCOUNTER — TELEPHONE (OUTPATIENT)
Dept: FAMILY MEDICINE CLINIC | Age: 70
End: 2020-06-18

## 2020-06-25 ENCOUNTER — HOSPITAL ENCOUNTER (OUTPATIENT)
Dept: INFUSION THERAPY | Age: 70
Discharge: HOME | End: 2020-06-25
Payer: MEDICARE

## 2020-06-25 ENCOUNTER — HOSPITAL ENCOUNTER (OUTPATIENT)
Dept: CT IMAGING | Age: 70
Discharge: HOME | End: 2020-06-27
Payer: MEDICARE

## 2020-06-25 DIAGNOSIS — C34.90 ADENOCARCINOMA OF LUNG, UNSPECIFIED LATERALITY (HCC): ICD-10-CM

## 2020-06-25 DIAGNOSIS — C34.81 MALIGNANT NEOPLASM OF OVERLAPPING SITES OF RIGHT BRONCHUS AND LUNG (HCC): ICD-10-CM

## 2020-06-25 DIAGNOSIS — C34.90 NON-SMALL CELL CARCINOMA OF LUNG (HCC): ICD-10-CM

## 2020-06-25 LAB
ALBUMIN SERPL-MCNC: 3.6 G/DL (ref 3.5–5.2)
ALP BLD-CCNC: 106 U/L (ref 40–129)
ALT SERPL-CCNC: 6 U/L (ref 0–40)
ANION GAP SERPL CALCULATED.3IONS-SCNC: 14 MMOL/L (ref 7–16)
AST SERPL-CCNC: 18 U/L (ref 0–39)
BASOPHILS ABSOLUTE: 0.01 E9/L (ref 0–0.2)
BASOPHILS RELATIVE PERCENT: 0.1 % (ref 0–2)
BILIRUB SERPL-MCNC: 0.3 MG/DL (ref 0–1.2)
BUN BLDV-MCNC: 11 MG/DL (ref 8–23)
CALCIUM SERPL-MCNC: 9.1 MG/DL (ref 8.6–10.2)
CHLORIDE BLD-SCNC: 99 MMOL/L (ref 98–107)
CO2: 26 MMOL/L (ref 22–29)
CREAT SERPL-MCNC: 1.1 MG/DL (ref 0.7–1.2)
EOSINOPHILS ABSOLUTE: 0 E9/L (ref 0.05–0.5)
EOSINOPHILS RELATIVE PERCENT: 0 % (ref 0–6)
GFR AFRICAN AMERICAN: >60
GFR NON-AFRICAN AMERICAN: >60 ML/MIN/1.73
GLUCOSE BLD-MCNC: 93 MG/DL (ref 74–99)
HCT VFR BLD CALC: 23.3 % (ref 37–54)
HEMOGLOBIN: 7.3 G/DL (ref 12.5–16.5)
IMMATURE GRANULOCYTES #: 0.05 E9/L
IMMATURE GRANULOCYTES %: 0.5 % (ref 0–5)
LYMPHOCYTES ABSOLUTE: 1.27 E9/L (ref 1.5–4)
LYMPHOCYTES RELATIVE PERCENT: 13.3 % (ref 20–42)
MAGNESIUM: 1.6 MG/DL (ref 1.6–2.6)
MCH RBC QN AUTO: 29.7 PG (ref 26–35)
MCHC RBC AUTO-ENTMCNC: 31.3 % (ref 32–34.5)
MCV RBC AUTO: 94.7 FL (ref 80–99.9)
MONOCYTES ABSOLUTE: 0.71 E9/L (ref 0.1–0.95)
MONOCYTES RELATIVE PERCENT: 7.5 % (ref 2–12)
NEUTROPHILS ABSOLUTE: 7.49 E9/L (ref 1.8–7.3)
NEUTROPHILS RELATIVE PERCENT: 78.6 % (ref 43–80)
PDW BLD-RTO: 19 FL (ref 11.5–15)
PLATELET # BLD: 339 E9/L (ref 130–450)
PMV BLD AUTO: 9.9 FL (ref 7–12)
POTASSIUM SERPL-SCNC: 3.8 MMOL/L (ref 3.5–5)
RBC # BLD: 2.46 E12/L (ref 3.8–5.8)
SODIUM BLD-SCNC: 139 MMOL/L (ref 132–146)
TOTAL PROTEIN: 6.9 G/DL (ref 6.4–8.3)
WBC # BLD: 9.5 E9/L (ref 4.5–11.5)

## 2020-06-25 PROCEDURE — 83735 ASSAY OF MAGNESIUM: CPT

## 2020-06-25 PROCEDURE — 6360000004 HC RX CONTRAST MEDICATION: Performed by: RADIOLOGY

## 2020-06-25 PROCEDURE — 36415 COLL VENOUS BLD VENIPUNCTURE: CPT

## 2020-06-25 PROCEDURE — 71260 CT THORAX DX C+: CPT

## 2020-06-25 PROCEDURE — 85025 COMPLETE CBC W/AUTO DIFF WBC: CPT

## 2020-06-25 PROCEDURE — 80053 COMPREHEN METABOLIC PANEL: CPT

## 2020-06-25 PROCEDURE — 74177 CT ABD & PELVIS W/CONTRAST: CPT

## 2020-06-25 RX ORDER — AMLODIPINE BESYLATE 10 MG/1
TABLET ORAL
Qty: 90 TABLET | Refills: 0 | Status: SHIPPED
Start: 2020-06-25 | End: 2020-07-07

## 2020-06-25 RX ORDER — ALLOPURINOL 100 MG/1
TABLET ORAL
Qty: 180 TABLET | Refills: 0 | Status: SHIPPED | OUTPATIENT
Start: 2020-06-25

## 2020-06-25 RX ORDER — HYDROCHLOROTHIAZIDE 12.5 MG/1
TABLET ORAL
Qty: 90 TABLET | Refills: 0 | Status: SHIPPED
Start: 2020-06-25 | End: 2020-07-07

## 2020-06-25 RX ORDER — CLONIDINE HYDROCHLORIDE 0.2 MG/1
TABLET ORAL
Qty: 180 TABLET | Refills: 0 | Status: SHIPPED
Start: 2020-06-25 | End: 2020-07-07

## 2020-06-25 RX ADMIN — IOPAMIDOL 110 ML: 755 INJECTION, SOLUTION INTRAVENOUS at 09:19

## 2020-06-26 ENCOUNTER — OFFICE VISIT (OUTPATIENT)
Dept: ONCOLOGY | Age: 70
End: 2020-06-26
Payer: MEDICARE

## 2020-06-26 ENCOUNTER — TELEPHONE (OUTPATIENT)
Dept: PULMONOLOGY | Age: 70
End: 2020-06-26

## 2020-06-26 ENCOUNTER — HOSPITAL ENCOUNTER (OUTPATIENT)
Dept: INFUSION THERAPY | Age: 70
Discharge: HOME | End: 2020-06-26
Payer: MEDICARE

## 2020-06-26 ENCOUNTER — TELEPHONE (OUTPATIENT)
Dept: ONCOLOGY | Age: 70
End: 2020-06-26

## 2020-06-26 VITALS — RESPIRATION RATE: 16 BRPM | SYSTOLIC BLOOD PRESSURE: 134 MMHG | HEART RATE: 95 BPM | DIASTOLIC BLOOD PRESSURE: 79 MMHG

## 2020-06-26 VITALS
SYSTOLIC BLOOD PRESSURE: 130 MMHG | TEMPERATURE: 97.6 F | OXYGEN SATURATION: 96 % | HEIGHT: 72 IN | DIASTOLIC BLOOD PRESSURE: 81 MMHG | BODY MASS INDEX: 21.74 KG/M2 | WEIGHT: 160.5 LBS | HEART RATE: 121 BPM

## 2020-06-26 DIAGNOSIS — Z51.11 ENCOUNTER FOR ANTINEOPLASTIC CHEMOTHERAPY: ICD-10-CM

## 2020-06-26 DIAGNOSIS — C34.81 MALIGNANT NEOPLASM OF OVERLAPPING SITES OF RIGHT BRONCHUS AND LUNG (HCC): ICD-10-CM

## 2020-06-26 DIAGNOSIS — C34.90 ADENOCARCINOMA OF LUNG, UNSPECIFIED LATERALITY (HCC): Primary | ICD-10-CM

## 2020-06-26 PROCEDURE — 96375 TX/PRO/DX INJ NEW DRUG ADDON: CPT

## 2020-06-26 PROCEDURE — 96417 CHEMO IV INFUS EACH ADDL SEQ: CPT

## 2020-06-26 PROCEDURE — 96411 CHEMO IV PUSH ADDL DRUG: CPT

## 2020-06-26 PROCEDURE — 96413 CHEMO IV INFUSION 1 HR: CPT

## 2020-06-26 PROCEDURE — 96372 THER/PROPH/DIAG INJ SC/IM: CPT

## 2020-06-26 PROCEDURE — 6360000002 HC RX W HCPCS: Performed by: INTERNAL MEDICINE

## 2020-06-26 PROCEDURE — 99212 OFFICE O/P EST SF 10 MIN: CPT | Performed by: INTERNAL MEDICINE

## 2020-06-26 PROCEDURE — 2580000003 HC RX 258: Performed by: INTERNAL MEDICINE

## 2020-06-26 RX ORDER — HEPARIN SODIUM (PORCINE) LOCK FLUSH IV SOLN 100 UNIT/ML 100 UNIT/ML
500 SOLUTION INTRAVENOUS PRN
Status: CANCELLED | OUTPATIENT
Start: 2020-06-26

## 2020-06-26 RX ORDER — CYANOCOBALAMIN 1000 UG/ML
1000 INJECTION INTRAMUSCULAR; SUBCUTANEOUS ONCE
Status: CANCELLED
Start: 2020-06-26

## 2020-06-26 RX ORDER — CYANOCOBALAMIN 1000 UG/ML
1000 INJECTION INTRAMUSCULAR; SUBCUTANEOUS ONCE
Status: COMPLETED | OUTPATIENT
Start: 2020-06-26 | End: 2020-06-26

## 2020-06-26 RX ORDER — SODIUM CHLORIDE 0.9 % (FLUSH) 0.9 %
10 SYRINGE (ML) INJECTION PRN
Status: DISCONTINUED | OUTPATIENT
Start: 2020-06-26 | End: 2020-06-27 | Stop reason: HOSPADM

## 2020-06-26 RX ORDER — SODIUM CHLORIDE 0.9 % (FLUSH) 0.9 %
10 SYRINGE (ML) INJECTION PRN
Status: CANCELLED | OUTPATIENT
Start: 2020-06-26

## 2020-06-26 RX ORDER — SODIUM CHLORIDE 9 MG/ML
20 INJECTION, SOLUTION INTRAVENOUS ONCE
Status: COMPLETED | OUTPATIENT
Start: 2020-06-26 | End: 2020-06-26

## 2020-06-26 RX ORDER — MEPERIDINE HYDROCHLORIDE 25 MG/ML
12.5 INJECTION INTRAMUSCULAR; INTRAVENOUS; SUBCUTANEOUS ONCE
Status: CANCELLED | OUTPATIENT
Start: 2020-06-26

## 2020-06-26 RX ORDER — METHYLPREDNISOLONE SODIUM SUCCINATE 125 MG/2ML
125 INJECTION, POWDER, LYOPHILIZED, FOR SOLUTION INTRAMUSCULAR; INTRAVENOUS ONCE
Status: CANCELLED | OUTPATIENT
Start: 2020-06-26

## 2020-06-26 RX ORDER — SODIUM CHLORIDE 9 MG/ML
20 INJECTION, SOLUTION INTRAVENOUS ONCE
Status: CANCELLED | OUTPATIENT
Start: 2020-06-26

## 2020-06-26 RX ORDER — DEXAMETHASONE SODIUM PHOSPHATE 10 MG/ML
8 INJECTION INTRAMUSCULAR; INTRAVENOUS ONCE
Status: COMPLETED | OUTPATIENT
Start: 2020-06-26 | End: 2020-06-26

## 2020-06-26 RX ORDER — SODIUM CHLORIDE 9 MG/ML
INJECTION, SOLUTION INTRAVENOUS CONTINUOUS
Status: CANCELLED | OUTPATIENT
Start: 2020-06-26

## 2020-06-26 RX ORDER — DEXAMETHASONE SODIUM PHOSPHATE 10 MG/ML
8 INJECTION, SOLUTION INTRAMUSCULAR; INTRAVENOUS ONCE
Status: CANCELLED | OUTPATIENT
Start: 2020-06-26

## 2020-06-26 RX ORDER — EPINEPHRINE 1 MG/ML
0.3 INJECTION, SOLUTION, CONCENTRATE INTRAVENOUS PRN
Status: CANCELLED | OUTPATIENT
Start: 2020-06-26

## 2020-06-26 RX ORDER — DIPHENHYDRAMINE HYDROCHLORIDE 50 MG/ML
50 INJECTION INTRAMUSCULAR; INTRAVENOUS ONCE
Status: CANCELLED | OUTPATIENT
Start: 2020-06-26

## 2020-06-26 RX ORDER — SODIUM CHLORIDE 0.9 % (FLUSH) 0.9 %
5 SYRINGE (ML) INJECTION PRN
Status: CANCELLED | OUTPATIENT
Start: 2020-06-26

## 2020-06-26 RX ADMIN — DEXAMETHASONE SODIUM PHOSPHATE 8 MG: 10 INJECTION INTRAMUSCULAR; INTRAVENOUS at 12:26

## 2020-06-26 RX ADMIN — SODIUM CHLORIDE 20 ML/HR: 9 INJECTION, SOLUTION INTRAVENOUS at 12:24

## 2020-06-26 RX ADMIN — SODIUM CHLORIDE 200 MG: 9 INJECTION, SOLUTION INTRAVENOUS at 12:40

## 2020-06-26 RX ADMIN — SODIUM CHLORIDE 1000 MG: 9 INJECTION, SOLUTION INTRAVENOUS at 13:17

## 2020-06-26 RX ADMIN — CYANOCOBALAMIN 1000 MCG: 1000 INJECTION, SOLUTION INTRAMUSCULAR; SUBCUTANEOUS at 12:30

## 2020-06-26 NOTE — TELEPHONE ENCOUNTER
This office received a call from [facility] who notified us that they were able to obtain another 60 days of [facility] from them. They stated that they are still draining the pleurx catheter and the last drainage amount was 450mls.

## 2020-07-07 ENCOUNTER — OFFICE VISIT (OUTPATIENT)
Dept: FAMILY MEDICINE CLINIC | Age: 70
End: 2020-07-07
Payer: COMMERCIAL

## 2020-07-07 VITALS
WEIGHT: 159 LBS | TEMPERATURE: 98 F | RESPIRATION RATE: 22 BRPM | HEIGHT: 72 IN | HEART RATE: 124 BPM | SYSTOLIC BLOOD PRESSURE: 127 MMHG | OXYGEN SATURATION: 96 % | BODY MASS INDEX: 21.54 KG/M2 | DIASTOLIC BLOOD PRESSURE: 84 MMHG

## 2020-07-07 PROBLEM — J90 BILATERAL PLEURAL EFFUSION: Status: ACTIVE | Noted: 2020-07-07

## 2020-07-07 PROBLEM — J93.83 OTHER PNEUMOTHORAX: Status: ACTIVE | Noted: 2020-05-01

## 2020-07-07 PROCEDURE — 4040F PNEUMOC VAC/ADMIN/RCVD: CPT | Performed by: STUDENT IN AN ORGANIZED HEALTH CARE EDUCATION/TRAINING PROGRAM

## 2020-07-07 PROCEDURE — 1036F TOBACCO NON-USER: CPT | Performed by: STUDENT IN AN ORGANIZED HEALTH CARE EDUCATION/TRAINING PROGRAM

## 2020-07-07 PROCEDURE — 3017F COLORECTAL CA SCREEN DOC REV: CPT | Performed by: STUDENT IN AN ORGANIZED HEALTH CARE EDUCATION/TRAINING PROGRAM

## 2020-07-07 PROCEDURE — G8420 CALC BMI NORM PARAMETERS: HCPCS | Performed by: STUDENT IN AN ORGANIZED HEALTH CARE EDUCATION/TRAINING PROGRAM

## 2020-07-07 PROCEDURE — 99212 OFFICE O/P EST SF 10 MIN: CPT | Performed by: STUDENT IN AN ORGANIZED HEALTH CARE EDUCATION/TRAINING PROGRAM

## 2020-07-07 PROCEDURE — G8427 DOCREV CUR MEDS BY ELIG CLIN: HCPCS | Performed by: STUDENT IN AN ORGANIZED HEALTH CARE EDUCATION/TRAINING PROGRAM

## 2020-07-07 PROCEDURE — 99214 OFFICE O/P EST MOD 30 MIN: CPT | Performed by: STUDENT IN AN ORGANIZED HEALTH CARE EDUCATION/TRAINING PROGRAM

## 2020-07-07 PROCEDURE — 1123F ACP DISCUSS/DSCN MKR DOCD: CPT | Performed by: STUDENT IN AN ORGANIZED HEALTH CARE EDUCATION/TRAINING PROGRAM

## 2020-07-07 RX ORDER — IPRATROPIUM BROMIDE AND ALBUTEROL SULFATE 2.5; .5 MG/3ML; MG/3ML
1 SOLUTION RESPIRATORY (INHALATION) EVERY 4 HOURS PRN
Qty: 360 ML | Refills: 1 | Status: SHIPPED | OUTPATIENT
Start: 2020-07-07

## 2020-07-07 ASSESSMENT — ENCOUNTER SYMPTOMS
CHEST TIGHTNESS: 0
SHORTNESS OF BREATH: 1
ABDOMINAL PAIN: 0
ANAL BLEEDING: 0

## 2020-07-07 NOTE — PROGRESS NOTES
S: 78 y.o. male with hx of lung cancer stage IV with brain metastases asking for urinal and a handicapped placard. Has urge incontinence several times a day and short of breath walking to the bathroom only 3x in the past year. Hx of ptx with pleural effusion in 5/2020. Has a pluerex catheter which is drained by home health 2x per week. Saw h/o 6/26 and for follow up in 2 weeks, on pembrolizumab and pemetrexed. No pleuritic chest pain. O: VS: /84   Pulse 124   Temp 98 °F (36.7 °C) (Oral)   Resp 22   Ht 6' (1.829 m)   Wt 159 lb (72.1 kg)   SpO2 96%   BMI 21.56 kg/m²    General: NAD, agitated   CV: tachycardic   Resp: CTAB no R/R/W   Abd:  Soft, nontender, no masses    Ext:  no C/C/E  Impression/Plan:   1. Dyspnea-cbc (hx of anemia), cmp, bmp, iron panel, vit b12 and folate;hx of effusions (wells score 2.5), cta ordered to r/o pe. Discussed risks of pe which could be lifethreatening, but pt desires to go home. 2. HTN-recently low, bp meds on hold  3. Urge incontinence-ua, cx, psa, urinal  Handicapped placard    rto in 1 week    Attending Physician Statement  I have discussed the case, including pertinent history and exam findings with the resident. I also have seen the patient and performed key portions of the examination. I agree with the documented assessment and plan.         Yoav Cooper MD

## 2020-07-07 NOTE — PROGRESS NOTES
CC:  Requests a urinal    HPI:  78 y.o. male with non-small cell lung cancer who is unable to hold his bladder and requests a urinal- and new handicap sticker. He has had 3-4 episodes of not being able to make it to the bathroom in time and urinated on himself, +did have sensation to void. Denies dysuria, blood in urine, dribbling urine at night. Non-Small Cell Lung Cancer with Mets to the Brain  [facility] comes to patients house to drain pleur-x/check vitals  Heme-oncology follows patient- last saw 6/5/2020  MRI ordered as per HemeOncology - will follow  Next chemotherapy- 7/17/2020. Had completed radiation therapy    Low Blood Pressure, with Hx of HTN  [facility] called in on 6/17/2020- patients BP was 78/55- so we held BP meds  Today BP is nl, and he has been compliant with not taking BP meds. No syncope, fatigue, sob    Dyspnea  Feels SOB constantly since chemotherapy/cancer treatment  Does not have oxygen machine at home.     Pleural Effusion and Pneumothorax   Presented ED for above in 5/2020  Pt was to follow-up Dr. Millie Hernandez  Did not follow-up or get CXR done    HM refused lab work or vaccines    Patient Active Problem List    Diagnosis Date Noted    Bilateral pleural effusion 07/07/2020    Other pneumothorax 05/2020    Adenocarcinoma, lung ([address] 03/27/2020    Malignant neoplasm of overlapping sites of right bronchus and lung ([address]      Encounter for antineoplastic chemotherapy      Pleural effusion 02/21/2020    History of cancer tonsil 02/21/2020    Hx of cardiac cath 02/21/2020    Pneumonia 02/21/2020    COPD exacerbation ([address]     Mass of right lung 02/19/2020    S/P laparoscopic fundoplication 53/91/0391    GERD with esophagitis     Left inguinal hernia 11/30/2017    LV dysfunction 10/02/2017    Dyslipidemia 10/02/2017    Non morbid obesity due to excess calories 10/02/2017    Cardiomyopathy ([address] 08/25/2016    Subcutaneous cyst 02/23/2015    Hiatal hernia with gastroesophageal reflux disease herni repair as child    BRONCHOSCOPY N/A 2020    BRONCHOSCOPY  EBUS , APC CRYO performed by Cuong Garcia MD at [address] [facility]  2020    BRONCHOSCOPY/TRANSBRONCHIAL NEEDLE BIOPSY performed by Cuong Garcia MD at [address] [facility]  2020    BRONCHOSCOPY/TRANSBRONCHIAL NEEDLE BIOPSY ADDL LOBE performed by Cuong Garcia MD at [address] TEST  2012    abnormal stress test    COLONOSCOPY      CORONARY ANGIOPLASTY      stents x2 by Dr Ramila Shin to marginal and circumflex    CORONARY ANGIOPLASTY WITH STENT PLACEMENT  2012    primary stenting  with NAZANIN  to 1st OMB of Left CX and pimary NAZANIN to distal  Left CX    CORONARY ARTERY BYPASS GRAFT MAZE PROCEDURE N/A 2020    PLEURAL CATHETER INSERTION--ULTRA SOUND BE COME OVER BEFORE CASE performed by Cuong Garcia MD at [facility]  2015    FROM ABOVE RT EYE BROW    DENTAL SURGERY  2012    full mouth dental extractions    DIAGNOSTIC CARDIAC CATH LAB PROCEDURE      ECHO COMPL W DOP COLOR FLOW  2012         GASTRIC FUNDOPLICATION N/A     LAPAROSCOPIC NISSEN FUNDOPLICATION performed by Raghav Pappas MD at [address] [facility]  2017    Dr. Karina Dinh  7/23/15    WITH BIOPSY    UPPER GASTROINTESTINAL ENDOSCOPY N/A 3/16/2018    EGD BIOPSY performed by Raghav Pappas MD at [address] [place] [address] History     Tobacco Use    Smoking status: Former Smoker     Packs/day: 0.50     Years: 31.00     Pack years: 15.50     Types: Cigarettes     Last attempt to quit: 2020     Years since quittin.4    Smokeless tobacco: Never Used   Substance Use Topics    Alcohol use: No    Drug use: No     Comment: past marijuana  use;  last used        ROS:    Review of Systems   Constitutional: Negative for activity change and appetite change.    Respiratory: Positive for shortness of breath. Negative for chest tightness. Cardiovascular: Negative for chest pain and leg swelling. Gastrointestinal: Negative for abdominal pain and anal bleeding. Musculoskeletal: Negative for gait problem and myalgias. Neurological: Negative for numbness and headaches. Psychiatric/Behavioral: Negative for confusion and dysphoric mood. Objective:    VS:  Blood pressure 127/84, pulse 124, temperature 98 °F (36.7 °C), temperature source Oral, resp. rate 22, height 6' (1.829 m), weight 159 lb (72.1 kg), SpO2 96 %. Physical Exam   Constitutional: He is oriented to person, place, and time. HENT:   Head: Normocephalic and atraumatic. Cardiovascular: Normal rate and regular rhythm. Exam reveals no gallop and no friction rub. No murmur heard. Pulmonary/Chest: He has no wheezes. Diminished sounds right lobe   Abdominal: Bowel sounds are normal. There is no abdominal tenderness. Musculoskeletal: Normal range of motion. General: No edema. Neurological: He is alert and oriented to person, place, and time. Skin: Skin is warm and dry. He is not diaphoretic. Assessment:    Urge incontinence- Urinal ordered. Declined UA, culture, prostate exam.     Dyspnea- CTA ordered to rule-out PE. Pt does not want CTA today and wants to leave hospital. Discussed risks associated with PE- will think about getting it done. He is aware of risks of PE (wells score) of not getting further workup today. Declined other lab workout, CBC etc. Ambulating pulse oximetry in office >90%. Handicap placard renewed. Pneumothorax 5/2020- ordered out-patient follow-up with Dr. Felix Galdamez. Was to do this after ER visit did not. He is agreeable to following up at this time. Non-small cell lung cancer- continue to follow-up with Heme-Onc. HTN- discontinued BP meds for now. Pt pressure is nl and [facility] will continue to monitor    Plans:  As above.       RTO 1 week to re-evaluate urinary

## 2020-07-09 ENCOUNTER — HOSPITAL ENCOUNTER (OUTPATIENT)
Age: 70
Discharge: HOME | End: 2020-07-11
Payer: COMMERCIAL

## 2020-07-17 ENCOUNTER — OFFICE VISIT (OUTPATIENT)
Dept: ONCOLOGY | Age: 70
End: 2020-07-17
Payer: COMMERCIAL

## 2020-07-17 ENCOUNTER — HOSPITAL ENCOUNTER (OUTPATIENT)
Dept: INFUSION THERAPY | Age: 70
Discharge: HOME | End: 2020-07-17
Payer: COMMERCIAL

## 2020-07-17 VITALS
SYSTOLIC BLOOD PRESSURE: 129 MMHG | OXYGEN SATURATION: 97 % | HEIGHT: 72 IN | HEART RATE: 109 BPM | TEMPERATURE: 95.4 F | BODY MASS INDEX: 21.16 KG/M2 | WEIGHT: 156.2 LBS | DIASTOLIC BLOOD PRESSURE: 65 MMHG

## 2020-07-17 DIAGNOSIS — C34.90 ADENOCARCINOMA OF LUNG, UNSPECIFIED LATERALITY (HCC): ICD-10-CM

## 2020-07-17 DIAGNOSIS — C34.90 NON-SMALL CELL CARCINOMA OF LUNG (HCC): ICD-10-CM

## 2020-07-17 DIAGNOSIS — C34.81 MALIGNANT NEOPLASM OF OVERLAPPING SITES OF RIGHT BRONCHUS AND LUNG (HCC): ICD-10-CM

## 2020-07-17 LAB
ALBUMIN SERPL-MCNC: 3.2 G/DL (ref 3.5–5.2)
ALP BLD-CCNC: 95 U/L (ref 40–129)
ALT SERPL-CCNC: <5 U/L (ref 0–40)
ANION GAP SERPL CALCULATED.3IONS-SCNC: 11 MMOL/L (ref 7–16)
AST SERPL-CCNC: 16 U/L (ref 0–39)
BASOPHILS ABSOLUTE: 0.01 E9/L (ref 0–0.2)
BASOPHILS RELATIVE PERCENT: 0.1 % (ref 0–2)
BILIRUB SERPL-MCNC: 0.3 MG/DL (ref 0–1.2)
BUN BLDV-MCNC: 12 MG/DL (ref 8–23)
CALCIUM SERPL-MCNC: 9.5 MG/DL (ref 8.6–10.2)
CHLORIDE BLD-SCNC: 101 MMOL/L (ref 98–107)
CO2: 26 MMOL/L (ref 22–29)
CREAT SERPL-MCNC: 1.3 MG/DL (ref 0.7–1.2)
EOSINOPHILS ABSOLUTE: 0.04 E9/L (ref 0.05–0.5)
EOSINOPHILS RELATIVE PERCENT: 0.5 % (ref 0–6)
GFR AFRICAN AMERICAN: >60
GFR NON-AFRICAN AMERICAN: >60 ML/MIN/1.73
GLUCOSE BLD-MCNC: 84 MG/DL (ref 74–99)
HCT VFR BLD CALC: 24.3 % (ref 37–54)
HEMOGLOBIN: 7.3 G/DL (ref 12.5–16.5)
IMMATURE GRANULOCYTES #: 0.06 E9/L
IMMATURE GRANULOCYTES %: 0.7 % (ref 0–5)
LYMPHOCYTES ABSOLUTE: 2.18 E9/L (ref 1.5–4)
LYMPHOCYTES RELATIVE PERCENT: 25.6 % (ref 20–42)
MAGNESIUM: 1.8 MG/DL (ref 1.6–2.6)
MCH RBC QN AUTO: 29.6 PG (ref 26–35)
MCHC RBC AUTO-ENTMCNC: 30 % (ref 32–34.5)
MCV RBC AUTO: 98.4 FL (ref 80–99.9)
MONOCYTES ABSOLUTE: 1.38 E9/L (ref 0.1–0.95)
MONOCYTES RELATIVE PERCENT: 16.2 % (ref 2–12)
NEUTROPHILS ABSOLUTE: 4.86 E9/L (ref 1.8–7.3)
NEUTROPHILS RELATIVE PERCENT: 56.9 % (ref 43–80)
PDW BLD-RTO: 19 FL (ref 11.5–15)
PLATELET # BLD: 610 E9/L (ref 130–450)
PMV BLD AUTO: 9 FL (ref 7–12)
POTASSIUM SERPL-SCNC: 3.8 MMOL/L (ref 3.5–5)
RBC # BLD: 2.47 E12/L (ref 3.8–5.8)
SODIUM BLD-SCNC: 138 MMOL/L (ref 132–146)
TOTAL PROTEIN: 6.5 G/DL (ref 6.4–8.3)
WBC # BLD: 8.5 E9/L (ref 4.5–11.5)

## 2020-07-17 PROCEDURE — 99212 OFFICE O/P EST SF 10 MIN: CPT

## 2020-07-17 PROCEDURE — 1123F ACP DISCUSS/DSCN MKR DOCD: CPT | Performed by: NURSE PRACTITIONER

## 2020-07-17 PROCEDURE — 3017F COLORECTAL CA SCREEN DOC REV: CPT | Performed by: NURSE PRACTITIONER

## 2020-07-17 PROCEDURE — G8427 DOCREV CUR MEDS BY ELIG CLIN: HCPCS | Performed by: NURSE PRACTITIONER

## 2020-07-17 PROCEDURE — 80053 COMPREHEN METABOLIC PANEL: CPT

## 2020-07-17 PROCEDURE — 99214 OFFICE O/P EST MOD 30 MIN: CPT | Performed by: NURSE PRACTITIONER

## 2020-07-17 PROCEDURE — 1036F TOBACCO NON-USER: CPT | Performed by: NURSE PRACTITIONER

## 2020-07-17 PROCEDURE — G8420 CALC BMI NORM PARAMETERS: HCPCS | Performed by: NURSE PRACTITIONER

## 2020-07-17 PROCEDURE — 83735 ASSAY OF MAGNESIUM: CPT

## 2020-07-17 PROCEDURE — 85025 COMPLETE CBC W/AUTO DIFF WBC: CPT

## 2020-07-17 PROCEDURE — 4040F PNEUMOC VAC/ADMIN/RCVD: CPT | Performed by: NURSE PRACTITIONER

## 2020-07-17 NOTE — PROGRESS NOTES
[address] MED ONCOLOGY  [address] [facility] [address] [id number]  Dept: [phone]  Consult Note      Reason for Visit:   Metastatic non-small cell lung cancer, metastatic to the brain. Referring Physician:  Alfredito Barth MD    PCP:  Go Washington MD    History of Present Illness:    78 y.o. male with a past medical hx of MI, HTN, DDD, CAD, and tonsillar squamous cell carcinoma. He was treated for his tonsillar cacrcinoma by Dr. Maggie Rodriguez at the [facility] in 2009 with Cisplatin and 5-Fu and radiation.      Hx smoking 0.52 packs of cigarettes per day for 31 years.      Admitted to the ED on 02/19/2020 with lung mass after complaints of cough, dizziness, and SOB beginning 2 months ago.      CTA chest 02/19/2020:  Large infiltrative mass in the right hilum extending to the right apex  concerning for malignancy with narrowing of the right upper lobe  bronchus and segmental branches of right upper lobe pulmonary artery which are occluded and invaded by tumor. No other central pulmonary embolism is identified. Multiple bilateral pulmonary nodules are identified which may represent metastasis. Further assessment by PET/CT scan and/or bronchoscopy is recommended.   Large pleural effusion and atelectasis in the right lower lobe.     CT abdomen/pelvis 02/19/2020:  Large pleural effusion in right lung base with atelectasis in the  right lower lobe and minimal pleural effusion in left base. Pneumonia is considered. Partially identified are multiple pulmonary nodules with  lymph nodes  in the cardiophrenic angle and upper abdomen concerning for  malignancy. Consider PET CT scan. Cystic lesions in liver and kidneys. There is no acute inflammation or bowel obstruction. There is thickening of the GE junction.      He is s/p right thoracentesis on 2/20/2020, 100 mL of straw-colored/clear pleural fluid removed, cytology was negative for malignant cells.     He had on 2/28/2020 [facility]/EBUS with FNA of station L4, station 7, cytology was positive for malignant cells, adenocarcinoma. He had a brain MRI done on 2/21/2020 revealingLeptomeningeal enhancement with possible involvement of the underlying cortex involving the left frontal gyrus. Considering history of lung mass, this is suspicious for metastatic disease. Other infectious and  inflammatory etiologies are also considered with primary malignancy felt less likely. The patient completed WB RT on 3/25/2020. He was started on systemic palliative therapy with carboplatin Alimta and Keytruda on 4/3/2020. He had a right thoracentesis done on 4/15/2020, 2200 cc of dark dariel-colored fluid was removed, no cytology was sent. He was in ED 05/30/2020 for pneumothorax and pleural effusion. He left same day AMA. Cycle # 4 Carboplatin, Alimta, and Keytruda was on 06/05/2020. Restaging scans were done afterward and showed response to treatment. Maintenance Keytrua + Lamona Alan was started on 06/26/2020. He presents today 07/17/2020 for cycle # 2 Maintenance Keytrua + Olamide. Denies fever, chills, chest pain, nausea, or vomiting. Stable SOB. Labs reviewed, Hbg 7.3. Will hold treatment today and have in return in 1 week for labs and possible treatment. Review of Systems;  CONSTITUTIONAL: No fever, chills. Positive for decreased appetite . Fatigue. ENMT: Eyes: No diplopia; Nose: No epistaxis. Mouth: No sore throat. RESPIRATORY: No hemoptysis, stable shortness of breath and cough. CARDIOVASCULAR: No chest pain, palpitations. GASTROINTESTINAL: No nausea/vomiting, abdominal pain, diarrhea/constipation. GENITOURINARY: No dysuria, urinary frequency, hematuria. NEURO: No syncope, presyncope, he has mild headaches.   Remainder:  ROS NEGATIVE  Past Medical History:      Diagnosis Date    CAD (coronary artery disease)     DDD (degenerative disc disease) 2/1/2011    Hiatal hernia with gastroesophageal reflux disease and stents x2 by Dr Franci Mueller to marginal and circumflex    CORONARY ANGIOPLASTY WITH STENT PLACEMENT  2012    primary stenting  with NAZANIN  to 1st OMB of Left CX and pimary NAZANIN to distal  Left CX    CORONARY ARTERY BYPASS GRAFT MAZE PROCEDURE N/A 2020    PLEURAL CATHETER INSERTION--ULTRA SOUND BE COME OVER BEFORE CASE performed by Savita Iraheta MD at [facility]  2015    FROM ABOVE RT EYE BROW    DENTAL SURGERY  2012    full mouth dental extractions    DIAGNOSTIC CARDIAC CATH LAB PROCEDURE      ECHO COMPL W DOP COLOR FLOW  2012         GASTRIC FUNDOPLICATION N/A     LAPAROSCOPIC NISSEN FUNDOPLICATION performed by Nilam Neville MD at [address] [facility]  2017    Dr. Karla Root  7/23/15    WITH BIOPSY    UPPER GASTROINTESTINAL ENDOSCOPY N/A 3/16/2018    EGD BIOPSY performed by Nilam Neville MD at [facility] ENDOSCOPY       Family History:  Family History   Problem Relation Age of Onset    Cancer Mother         lung cancer    Cancer Father         lung cancer    Diabetes Brother     High Blood Pressure Brother     High Blood Pressure Brother        Medications:  Reviewed and reconciled.     Social History:  Social History     Socioeconomic History    Marital status: Single     Spouse name: Not on file    Number of children: Not on file    Years of education: Not on file    Highest education level: Not on file   Occupational History    Not on file   Social Needs    Financial resource strain: Not on file    Food insecurity     Worry: Not on file     Inability: Not on file    Transportation needs     Medical: Not on file     Non-medical: Not on file   Tobacco Use    Smoking status: Former Smoker     Packs/day: 0.50     Years: 31.00     Pack years: 15.50     Types: Cigarettes     Last attempt to quit: 2020     Years since quittin.4    Smokeless tobacco: Never Used Substance and Sexual Activity    Alcohol use: No    Drug use: No     Comment: past marijuana  use;  last used 2010    Sexual activity: Yes     Partners: Female   Lifestyle    Physical activity     Days per week: Not on file     Minutes per session: Not on file    Stress: Not on file   Relationships    Social connections     Talks on phone: Not on file     Gets together: Not on file     Attends Orthodoxy service: Not on file     Active member of club or organization: Not on file     Attends meetings of clubs or organizations: Not on file     Relationship status: Not on file    Intimate partner violence     Fear of current or ex partner: Not on file     Emotionally abused: Not on file     Physically abused: Not on file     Forced sexual activity: Not on file   Other Topics Concern    Not on file   Social History Narrative    Drinks 1-2 cups of coffee daily. Chronic pain. Allergies:  No Known Allergies    Physical Exam:  /65 (Site: Right Upper Arm, Position: Sitting, Cuff Size: Medium Adult)   Pulse 109   Temp 95.4 °F (35.2 °C) (Temporal)   Ht 6' (1.829 m)   Wt 156 lb 3.2 oz (70.9 kg)   SpO2 97%   BMI 21.18 kg/m²     GENERAL: Alert, oriented x 3, not in acute distress. HEENT: PERRLA; EOMI. Oropharynx clear. NECK: Supple. No palpable cervical or supraclavicular lymphadenopathy. LUNGS: Decreased air entry on the right side. CARDIOVASCULAR: Regular rate. No murmurs, rubs or gallops. ABDOMEN: Soft. Non-tender, non-distended. Positive bowel sounds. EXTREMITIES: Without clubbing, cyanosis, or edema. NEUROLOGIC: No focal deficits. ECOG PS 1     Impression/Plan:      78 y.o. male with a past medical hx of MI, HTN, DDD, CAD, and tonsillar squamous cell carcinoma. He was treated for his tonsillar cacrcinoma with Cisplatin and 5-Fu and radiation.   The patient had presented in February 2020 with cough shortness of breath and dizziness, he was diagnosed with non-small cell lung cancer, adenocarcinoma, he has a large infiltrative mass in the right hilum, bilateral pulmonary nodules, and lymphadenopathy in the chest and upper abdomen, also metastatic disease to the brain. He completed WB RT on 3/25/2020. The patient had a PET scan done for staging on 3/24/2020, the results documented were reviewed with the patient, he has complete atelectasis of the right lung with large right pleural effusion, hypermetabolic activity in the right hilar location surrounding the right mainstem bronchus, multiple areas of hypermetabolic radiotracer activity with the present pleura notably overlying right upper lobe laterally, anterior and medial right lower lobe as well as pleura overlying left lower lobe extending into the abdomen along the anuj of the diaphragm. Results/images were reviewed with the patient. Discussed with the patient his diagnosis, prognosis, and recommendation for treatment with systemic palliative therapy. PDL 1 expression is 1%, he does not have the EGFR or the BRAF mutations, no ALK rearrangement,  Ros 1 gene rearrangement:  Negative. He was started on systemic palliative therapy with carboplatin Alimta and Keytruda on 4/3/2020. The patient rated the first treatment fairly well. He has hyponatremia, increased BUN and hypokalemia, he will receive intravenous hydration and potassium will be repleted. Large right sided pleural effusion. He had a right thoracentesis done on 4/15/2020, 2200 cc of dark dariel-colored fluid was removed, no cytology was sent. The shortness of breath had improved. Follow-up with palliative medicine for symptoms management. -Right PleurX catheter inserted per Dr. Wilmer Bradley 04/27/2020. Home Health was consulted to assist with drain and catheter. Cycle # 2 Carboplatin, Alimta, and Keytruda was on 04/24/2020. Cycle # 3 Carboplatin, Alimta, and Keytruda was on 05/15/2020. He was in ED 05/30/2020 for pneumothorax and pleural effusion.  He left same day AMA. Cycle # 4 Carboplatin, Alimta, and Keytruda was on 06/05/2020. CT chest 06/25/2020:   Right chest tube with tip at the posterior right apex. Large residual right pleural effusion shows thick fluid, numerous gas bubbles, and an anterior pneumothorax space. Large masslike consolidation in the lateral right upper lobe has decreased in size. Right hilar adenopathy decrease in size. New areas of consolidation in the periphery of the right middle and right lower lobe. Decreasing mediastinum lymphadenopathy. Benign-appearing hepatic and renal cysts. New mild bile duct dilatation. Descending retroperitoneal adenopathy. .    CT abdomen/pelvis 06/25/2020:   Slightly increased retroperitoneal lymph nodes with hazy infiltration of the retroperitoneal fat. Chronic distal abdominal aortic ectasia 2.5 cm. Slightly increased from previous. Benign-appearing multiple hepatic and renal cysts. Chronic postoperative changes low in the pelvis. Stable and chronic lumbar degeneration. Cycle # 1 Maintenance Keytrua + Alimta was on 06/26/2020. Cycle # 2 Maintenance Keytrua + Alimta is held today 07/17/2020. Labs reviewed. Hbg 7.3. Decreased appetite, weight loss, follow-up with Keyla. Follow-up with palliative medicine for symptoms management.     RTC in 1 week for possible treatment     DAWSON Gresham [facility]  MEDICAL ONCOLOGY  [address] [place] [id number]  Dept: [phone]  July 17, 2020

## 2020-07-24 ENCOUNTER — HOSPITAL ENCOUNTER (OUTPATIENT)
Dept: INFUSION THERAPY | Age: 70
Discharge: HOME | End: 2020-07-24
Payer: COMMERCIAL

## 2020-07-24 ENCOUNTER — OFFICE VISIT (OUTPATIENT)
Dept: ONCOLOGY | Age: 70
End: 2020-07-24
Payer: COMMERCIAL

## 2020-07-24 VITALS
DIASTOLIC BLOOD PRESSURE: 95 MMHG | HEART RATE: 94 BPM | TEMPERATURE: 97.2 F | SYSTOLIC BLOOD PRESSURE: 151 MMHG | RESPIRATION RATE: 18 BRPM

## 2020-07-24 VITALS
DIASTOLIC BLOOD PRESSURE: 89 MMHG | OXYGEN SATURATION: 99 % | TEMPERATURE: 96.2 F | BODY MASS INDEX: 20.62 KG/M2 | HEIGHT: 72 IN | WEIGHT: 152.2 LBS | HEART RATE: 107 BPM | SYSTOLIC BLOOD PRESSURE: 129 MMHG

## 2020-07-24 DIAGNOSIS — C34.90 ADENOCARCINOMA OF LUNG, UNSPECIFIED LATERALITY (HCC): Primary | ICD-10-CM

## 2020-07-24 DIAGNOSIS — C34.81 MALIGNANT NEOPLASM OF OVERLAPPING SITES OF RIGHT BRONCHUS AND LUNG (HCC): ICD-10-CM

## 2020-07-24 DIAGNOSIS — Z51.11 ENCOUNTER FOR ANTINEOPLASTIC CHEMOTHERAPY: ICD-10-CM

## 2020-07-24 DIAGNOSIS — C34.90 NON-SMALL CELL CARCINOMA OF LUNG (HCC): ICD-10-CM

## 2020-07-24 LAB
ALBUMIN SERPL-MCNC: 3.2 G/DL (ref 3.5–5.2)
ALP BLD-CCNC: 100 U/L (ref 40–129)
ALT SERPL-CCNC: <5 U/L (ref 0–40)
ANION GAP SERPL CALCULATED.3IONS-SCNC: 13 MMOL/L (ref 7–16)
AST SERPL-CCNC: 16 U/L (ref 0–39)
BASOPHILS ABSOLUTE: 0.01 E9/L (ref 0–0.2)
BASOPHILS RELATIVE PERCENT: 0.1 % (ref 0–2)
BILIRUB SERPL-MCNC: 0.2 MG/DL (ref 0–1.2)
BUN BLDV-MCNC: 14 MG/DL (ref 8–23)
CALCIUM SERPL-MCNC: 9.8 MG/DL (ref 8.6–10.2)
CHLORIDE BLD-SCNC: 100 MMOL/L (ref 98–107)
CO2: 25 MMOL/L (ref 22–29)
CREAT SERPL-MCNC: 1.2 MG/DL (ref 0.7–1.2)
EOSINOPHILS ABSOLUTE: 0 E9/L (ref 0.05–0.5)
EOSINOPHILS RELATIVE PERCENT: 0 % (ref 0–6)
GFR AFRICAN AMERICAN: >60
GFR NON-AFRICAN AMERICAN: >60 ML/MIN/1.73
GLUCOSE BLD-MCNC: 119 MG/DL (ref 74–99)
HCT VFR BLD CALC: 29.4 % (ref 37–54)
HEMOGLOBIN: 9 G/DL (ref 12.5–16.5)
IMMATURE GRANULOCYTES #: 0.01 E9/L
IMMATURE GRANULOCYTES %: 0.1 % (ref 0–5)
LYMPHOCYTES ABSOLUTE: 1.76 E9/L (ref 1.5–4)
LYMPHOCYTES RELATIVE PERCENT: 20.4 % (ref 20–42)
MAGNESIUM: 1.9 MG/DL (ref 1.6–2.6)
MCH RBC QN AUTO: 29.3 PG (ref 26–35)
MCHC RBC AUTO-ENTMCNC: 30.6 % (ref 32–34.5)
MCV RBC AUTO: 95.8 FL (ref 80–99.9)
MONOCYTES ABSOLUTE: 0.39 E9/L (ref 0.1–0.95)
MONOCYTES RELATIVE PERCENT: 4.5 % (ref 2–12)
NEUTROPHILS ABSOLUTE: 6.46 E9/L (ref 1.8–7.3)
NEUTROPHILS RELATIVE PERCENT: 74.9 % (ref 43–80)
PDW BLD-RTO: 17.9 FL (ref 11.5–15)
PLATELET # BLD: 528 E9/L (ref 130–450)
PMV BLD AUTO: 9.8 FL (ref 7–12)
POTASSIUM SERPL-SCNC: 4 MMOL/L (ref 3.5–5)
RBC # BLD: 3.07 E12/L (ref 3.8–5.8)
REASON FOR REJECTION: NORMAL
REJECTED TEST: NORMAL
SODIUM BLD-SCNC: 138 MMOL/L (ref 132–146)
TOTAL PROTEIN: 7 G/DL (ref 6.4–8.3)
WBC # BLD: 8.6 E9/L (ref 4.5–11.5)

## 2020-07-24 PROCEDURE — 96417 CHEMO IV INFUS EACH ADDL SEQ: CPT

## 2020-07-24 PROCEDURE — 80053 COMPREHEN METABOLIC PANEL: CPT

## 2020-07-24 PROCEDURE — 83735 ASSAY OF MAGNESIUM: CPT

## 2020-07-24 PROCEDURE — 96375 TX/PRO/DX INJ NEW DRUG ADDON: CPT

## 2020-07-24 PROCEDURE — 6360000002 HC RX W HCPCS: Performed by: INTERNAL MEDICINE

## 2020-07-24 PROCEDURE — 99212 OFFICE O/P EST SF 10 MIN: CPT | Performed by: INTERNAL MEDICINE

## 2020-07-24 PROCEDURE — 96413 CHEMO IV INFUSION 1 HR: CPT

## 2020-07-24 PROCEDURE — 2580000003 HC RX 258: Performed by: INTERNAL MEDICINE

## 2020-07-24 PROCEDURE — 85025 COMPLETE CBC W/AUTO DIFF WBC: CPT

## 2020-07-24 RX ORDER — METHYLPREDNISOLONE SODIUM SUCCINATE 125 MG/2ML
125 INJECTION, POWDER, LYOPHILIZED, FOR SOLUTION INTRAMUSCULAR; INTRAVENOUS ONCE
Status: CANCELLED | OUTPATIENT
Start: 2020-07-24

## 2020-07-24 RX ORDER — SODIUM CHLORIDE 9 MG/ML
20 INJECTION, SOLUTION INTRAVENOUS ONCE
Status: DISCONTINUED | OUTPATIENT
Start: 2020-07-24 | End: 2020-07-25 | Stop reason: HOSPADM

## 2020-07-24 RX ORDER — SODIUM CHLORIDE 9 MG/ML
INJECTION, SOLUTION INTRAVENOUS CONTINUOUS
Status: CANCELLED | OUTPATIENT
Start: 2020-07-24

## 2020-07-24 RX ORDER — SODIUM CHLORIDE 0.9 % (FLUSH) 0.9 %
10 SYRINGE (ML) INJECTION PRN
Status: CANCELLED | OUTPATIENT
Start: 2020-07-24

## 2020-07-24 RX ORDER — DIPHENHYDRAMINE HYDROCHLORIDE 50 MG/ML
50 INJECTION INTRAMUSCULAR; INTRAVENOUS ONCE
Status: CANCELLED | OUTPATIENT
Start: 2020-07-24

## 2020-07-24 RX ORDER — HEPARIN SODIUM (PORCINE) LOCK FLUSH IV SOLN 100 UNIT/ML 100 UNIT/ML
500 SOLUTION INTRAVENOUS PRN
Status: CANCELLED | OUTPATIENT
Start: 2020-07-24

## 2020-07-24 RX ORDER — DEXAMETHASONE SODIUM PHOSPHATE 10 MG/ML
8 INJECTION INTRAMUSCULAR; INTRAVENOUS ONCE
Status: COMPLETED | OUTPATIENT
Start: 2020-07-24 | End: 2020-07-24

## 2020-07-24 RX ORDER — MEPERIDINE HYDROCHLORIDE 25 MG/ML
12.5 INJECTION INTRAMUSCULAR; INTRAVENOUS; SUBCUTANEOUS ONCE
Status: CANCELLED | OUTPATIENT
Start: 2020-07-24

## 2020-07-24 RX ORDER — EPINEPHRINE 1 MG/ML
0.3 INJECTION, SOLUTION, CONCENTRATE INTRAVENOUS PRN
Status: CANCELLED | OUTPATIENT
Start: 2020-07-24

## 2020-07-24 RX ORDER — SODIUM CHLORIDE 0.9 % (FLUSH) 0.9 %
5 SYRINGE (ML) INJECTION PRN
Status: CANCELLED | OUTPATIENT
Start: 2020-07-24

## 2020-07-24 RX ORDER — DEXAMETHASONE SODIUM PHOSPHATE 10 MG/ML
8 INJECTION, SOLUTION INTRAMUSCULAR; INTRAVENOUS ONCE
Status: CANCELLED | OUTPATIENT
Start: 2020-07-24

## 2020-07-24 RX ORDER — SODIUM CHLORIDE 9 MG/ML
20 INJECTION, SOLUTION INTRAVENOUS ONCE
Status: CANCELLED | OUTPATIENT
Start: 2020-07-24

## 2020-07-24 RX ORDER — SODIUM CHLORIDE 0.9 % (FLUSH) 0.9 %
10 SYRINGE (ML) INJECTION PRN
Status: DISCONTINUED | OUTPATIENT
Start: 2020-07-24 | End: 2020-07-25 | Stop reason: HOSPADM

## 2020-07-24 RX ADMIN — DEXAMETHASONE SODIUM PHOSPHATE 8 MG: 10 INJECTION INTRAMUSCULAR; INTRAVENOUS at 11:14

## 2020-07-24 RX ADMIN — SODIUM CHLORIDE 1000 MG: 9 INJECTION, SOLUTION INTRAVENOUS at 12:13

## 2020-07-24 RX ADMIN — SODIUM CHLORIDE 20 ML/HR: 9 INJECTION, SOLUTION INTRAVENOUS at 11:09

## 2020-07-24 RX ADMIN — SODIUM CHLORIDE 200 MG: 9 INJECTION, SOLUTION INTRAVENOUS at 11:28

## 2020-07-24 RX ADMIN — SODIUM CHLORIDE, PRESERVATIVE FREE 10 ML: 5 INJECTION INTRAVENOUS at 08:55

## 2020-07-24 RX ADMIN — SODIUM CHLORIDE, PRESERVATIVE FREE 10 ML: 5 INJECTION INTRAVENOUS at 11:14

## 2020-07-27 ENCOUNTER — TELEPHONE (OUTPATIENT)
Dept: PULMONOLOGY | Age: 70
End: 2020-07-27

## 2020-07-27 NOTE — TELEPHONE ENCOUNTER
Call from home care nurse Angela Figueroa requesting home care visit to continue another month to help manage Pleurx Cath and work with pt on becoming independent at home. Family reports doing some drainage and nurse is also assessing fluid. Angela Figueroa reports drainage is every 2-3 days with approx 200-300 ml of fluid drained from Chest Tube site. Home Care nursing will continue for another month. Angela Figueroa to send over additional orders for further nursing visits.

## 2020-07-28 ENCOUNTER — HOSPITAL ENCOUNTER (OUTPATIENT)
Dept: RADIATION ONCOLOGY | Age: 70
Discharge: HOME | End: 2020-07-28
Payer: COMMERCIAL

## 2020-07-28 PROCEDURE — 99441 PR PHYS/QHP TELEPHONE EVALUATION 5-10 MIN: CPT | Performed by: NURSE PRACTITIONER

## 2020-07-28 NOTE — PROGRESS NOTES
Telephone visit due to COVID-19    Patient identified: yes  Patient consented for telehealth services: yes  Patient location: home  Provider location: [facility] on today's call: patient          Radiation Oncology   Follow Up Note        7/28/2020    Michael Traylor. Emily Bang MD,    HPI:  Michael Arellano is a pleasant 78 y.o. male with a diagnosis of stage 4 NSCLC with brain mets. The patient underwent a course of radiation therapy to the whole brain, which was completed on 3/25/20. He completed 3750 cGy in 15 fractions. Patient confused on phone. Does not remember having radiation therapy and then states that he remembers having radiation therapy after he is reminded. No family available to answer questions for/with patient. Patient denies headache, seizures, nausea, vomiting, and dizziness. Patient did not yet complete MRI Brain as ordered by Dr Breezy Mabry to be done 3 mos post radiation completion. Patient is following with Dr Shantal Banuelos for medical oncology. Continues on Alimta and Keytruda. Next appt 8/14/20.     Past Medical History:   Diagnosis Date    CAD (coronary artery disease)     DDD (degenerative disc disease) 2/1/2011    Hiatal hernia with gastroesophageal reflux disease and esophagitis     Hyperlipidemia     Hypertension     Inguinal hernia     left  S/P repair 11/29/17    Intervertebral disk disease     Lung cancer ([address]     MI (myocardial infarction) ([address]     Pneumonia 2/21/2020    Pneumothorax with pleural effusion 5/2020 5/1/2020    Primary tonsillar squamous cell carcinoma ([address] 7/8/2013    radiation to area    Psychiatric problem     S/P cardiac cath 2/21/2020         Past Surgical History:   Procedure Laterality Date    ABDOMEN SURGERY      herni repair as child    BRONCHOSCOPY N/A 2/28/2020    BRONCHOSCOPY  EBUS , APC CRYO performed by Atiya Mcwilliams MD at [address] [place] [address]  2/28/2020    BRONCHOSCOPY/TRANSBRONCHIAL NEEDLE BIOPSY performed by Martin Tejada MD at [address] [facility]  2020    BRONCHOSCOPY/TRANSBRONCHIAL NEEDLE BIOPSY ADDL LOBE performed by Martin Tejada MD at [address] TEST  2012    abnormal stress test    COLONOSCOPY      CORONARY ANGIOPLASTY      stents x2 by Dr Sofia Turner to marginal and circumflex    CORONARY ANGIOPLASTY WITH STENT PLACEMENT  2012    primary stenting  with NAZANIN  to 1st OMB of Left CX and pimary ANZANIN to distal  Left CX    CORONARY ARTERY BYPASS GRAFT MAZE PROCEDURE N/A 2020    PLEURAL CATHETER INSERTION--ULTRA SOUND BE COME OVER BEFORE CASE performed by Martin Tejada MD at [facility]  2015    FROM ABOVE RT EYE BROW    DENTAL SURGERY  2012    full mouth dental extractions    DIAGNOSTIC CARDIAC CATH LAB PROCEDURE      ECHO COMPL W DOP COLOR FLOW  2012         GASTRIC FUNDOPLICATION N/A     LAPAROSCOPIC NISSEN FUNDOPLICATION performed by Maria C Germain MD at [address] [facility]  2017    Dr. Camila Burns  7/23/15    WITH BIOPSY    UPPER GASTROINTESTINAL ENDOSCOPY N/A 3/16/2018    EGD BIOPSY performed by [facility] C [facility]ain MD at [facility] History     Socioeconomic History    Marital status: Single     Spouse name: Not on file    Number of children: Not on file    Years of education: Not on file    Highest education level: Not on file   Occupational History    Not on file   Social Needs    Financial resource strain: Not on file    Food insecurity     Worry: Not on file     Inability: Not on file    Transportation needs     Medical: Not on file     Non-medical: Not on file   Tobacco Use    Smoking status: Former Smoker     Packs/day: 0.50     Years: 31.00     Pack years: 15.50     Types: Cigarettes     Last attempt to quit: 2020     Years since quittin.4    Smokeless tobacco: Never Used   Substance and Sexual Activity    Alcohol use: No    Drug use: No     Comment: past marijuana  use;  last used 2010    Sexual activity: Yes     Partners: Female   Lifestyle    Physical activity     Days per week: Not on file     Minutes per session: Not on file    Stress: Not on file   Relationships    Social connections     Talks on phone: Not on file     Gets together: Not on file     Attends Zoroastrianism service: Not on file     Active member of club or organization: Not on file     Attends meetings of clubs or organizations: Not on file     Relationship status: Not on file    Intimate partner violence     Fear of current or ex partner: Not on file     Emotionally abused: Not on file     Physically abused: Not on file     Forced sexual activity: Not on file   Other Topics Concern    Not on file   Social History Narrative    Drinks 1-2 cups of coffee daily. Chronic pain. Family History   Problem Relation Age of Onset   Eyvonne Clonts Cancer Mother         lung cancer    Cancer Father         lung cancer    Diabetes Brother     High Blood Pressure Brother     High Blood Pressure Brother        Allergies:   Patient has no known allergies.       Current Outpatient Medications   Medication Sig Dispense Refill    Handicap Placard MISC by Does not apply route Patient cannot walk 200 ft without stopping to rest.    Expiration 2022 1 each 0    ipratropium-albuterol (DUONEB) 0.5-2.5 (3) MG/3ML SOLN nebulizer solution Inhale 3 mLs into the lungs every 4 hours as needed for Shortness of Breath 360 mL 1    allopurinol (ZYLOPRIM) 100 MG tablet take 1 tablet by mouth twice a day 180 tablet 0    pantoprazole (PROTONIX) 40 MG tablet Take 1 tablet by mouth daily 90 tablet 3    traZODone (DESYREL) 50 MG tablet Take 1 tablet by mouth nightly 90 tablet 1    albuterol sulfate HFA (PROVENTIL HFA) 108 (90 Base) MCG/ACT inhaler Inhale 2 puffs into the lungs 4 times daily 1 Inhaler 3    morphine (MS CONTIN) 15 MG extended release tablet Take 1 tablet by mouth every 12 hours.  prochlorperazine (COMPAZINE) 10 MG tablet Take 1 tablet by mouth every 6 hours as needed (nausea) 120 tablet 1    dexamethasone (DECADRON) 4 MG tablet Take 1 tablet by mouth 2 times daily Take for 3 days, Starting the day before chemotherapy, the day of and day after chemotherapy. 40 tablet 0    loratadine (CLARITIN) 10 MG tablet Take 1 tablet by mouth daily 30 tablet 5    ondansetron (ZOFRAN) 4 MG tablet Take 1 tablet by mouth every 8 hours as needed for Nausea or Vomiting 60 tablet 1    clotrimazole (MYCELEX) 10 MG daryl Take 10 mg by mouth 5 times daily      losartan (COZAAR) 100 MG tablet take 1 tablet by mouth IN THE MORNING 90 tablet 1    memantine (NAMENDA) 10 MG tablet Take 1 tablet by mouth 2 times daily 60 tablet 5    tiotropium (SPIRIVA HANDIHALER) 18 MCG inhalation capsule Inhale 1 capsule into the lungs daily 30 capsule 0    oxyCODONE-acetaminophen (PERCOCET) 7.5-325 MG per tablet take 1 tablet by mouth every 8 hours AS NEEDED FOR PAIN  0    atorvastatin (LIPITOR) 10 MG tablet take 1 tablet by mouth at bedtime 90 tablet 3    metoprolol succinate (TOPROL XL) 50 MG extended release tablet Take 1 tablet by mouth every morning 90 tablet 5    NARCAN 4 MG/0.1ML LIQD nasal spray instill 1 spray in 1 NOSTRIL if needed for opioid overdose may re. ..  (REFER TO PRESCRIPTION NOTES). 0    aspirin 81 MG EC tablet Take 1 tablet by mouth daily 90 tablet 3     No current facility-administered medications for this encounter. REVIEW OF SYSTEMS:       Constitutional:  No fever chills or rigors.  Eyes: No changes in vision, discharge, or pain   ENT: No Headaches, hearing loss or vertigo. No mouth sores or sore throat. No change in taste or smell.  Cardiovascular: No chest discomfort, dyspnea on exertion, palpitations or loss of consciousness or phlebitis.  Respiratory: Has no cough or wheezing, Has no sputum production.  Has no hemoptysis, has no pleuritic pain.  Gastrointestinal: No abdominal pain, appetite loss, blood in stools. No change in bowel habits. No hematemesis    Genitourinary: Patient acknowledges no dysuria, trouble voiding, or hematuria. No nocturia or increased frequency.  Musculoskeletal: No gait disturbance, weakness or joint complaints.  Integumentary: No rash or pruritis.  Neurological: No headache, diplopia, change in muscle strength, numbness or tingling. No change in gait, balance, coordination, mood, affect, memory, mentation, behavior.  Psychiatric: No anxiety, or depression.  Endocrine: No temperature intolerance. No excessive thirst, fluid intake, or urination. No tremor.  Hematologic/Lymphatic: No abnormal bruising or bleeding, blood clots or swollen lymph nodes.  Allergic/Immunologic: No nasal congestion or hives. IMAGING:    MRI Brain not yet completed. ASSESSMENT/PLAN:    Stage 4 NSCLC with brain mets, s/p radiation therapy to the whole brain completed on 3/25/20 (3750 cGy in 15 fractions). Patient appears pleasantly confused via telephone as he did not remember receiving whole brain radiation. Asymptomatic per his report otherwise. No family on line to help answer further questions. Patient was to complete MRI Brain prior to this appt. However, this has not yet been completed. Will ask Elizabeth Echavarria RN to help with scheduling this for patient ASAP. Cont to follow with Dr Sanket Scott for medical oncology. Continues on Alimta and Keytruda. Next appt 8/14/20. I discussed follow up plans with Nathaniel Moore. .  At this time, Dr Coby Bunch will see the patient back for a 3 month post radiation completion follow up visit. Instructed to follow up with other providers involved in their care as directed (including but not limited to Medical Oncology, Primary Care, Pulmonary, and Surgery).        The patient was given our contact number in the event that if at any time they change their mind and would like to return to the clinic to see either myself or one of the Radiation Oncologists, they can simply call us and we would be happy to see them. Thank you for involving us in the management of this extremely pleasant patient. Sincerely,    Brisa Alfredo, MSN, RN, APRN-CNP  Nurse Practitioner for Radiation Oncology    PHYSICIANS [facility]: [phone] (HEB: [phone])  [facility]:  [phone] (ULB:  [phone])  [address] [place] [address]: [phone] (APS: [phone])        I AFFIRM this is a Patient Initiated Episode with a Patient who has not had a related appointment within my department in the past 7 days or scheduled within the next 24 hours.     Total time: 5-10 min

## 2020-08-05 ENCOUNTER — TELEPHONE (OUTPATIENT)
Dept: RADIATION ONCOLOGY | Age: 70
End: 2020-08-05

## 2020-08-05 NOTE — TELEPHONE ENCOUNTER
Central scheduling called x 2 to inquire about rescheduling MRI that pt missed earlier this month. Central scheduling states that patient has missed multiple appointments & that they have been unable to reach him to reschedule. This nurse unable to reschedule appointment for pt due to unable to confidently complete MRI checklist. This nurse left message for Mr. Dockery Nicely asking him to contact central scheduling and left him that contact information.

## 2020-08-14 ENCOUNTER — TELEPHONE (OUTPATIENT)
Dept: ONCOLOGY | Age: 70
End: 2020-08-14

## 2020-08-14 ENCOUNTER — HOSPITAL ENCOUNTER (OUTPATIENT)
Dept: INFUSION THERAPY | Age: 70
Discharge: HOME | End: 2020-08-14
Payer: COMMERCIAL

## 2020-08-14 NOTE — TELEPHONE ENCOUNTER
Patient No Show for today's, 08/14/20, follow up and treatment with Dr. Pamela Tinajero. Called patient and received voice mail. Left message for patient to call our office to reschedule follow up appointment and chemo treatment with Dr. Pamela Tinajero.

## 2022-06-02 NOTE — PLAN OF CARE
Next Appointment:  None scheduled  Last seen:  9/2/21  Last refill:      Patient was to follow up in 3 months, not refilled until patient makes apt.      Problem: Pain:  Goal: Pain level will decrease  Description  Pain level will decrease  Outcome: Met This Shift     Problem: Falls - Risk of:  Goal: Will remain free from falls  Description  Will remain free from falls  Outcome: Met This Shift

## 2023-03-08 NOTE — ANESTHESIA POSTPROCEDURE EVALUATION
Department of Anesthesiology  Postprocedure Note    Patient: Daniel Mullen MRN: [id number]  YOB: 1950  Date of evaluation: 2/28/2020  Time:  4:53 PM     Procedure Summary     Date:  02/28/20 [facility]    Anesthesia Start:  8625 Anesthesia Stop:  1548    Procedures:       BRONCHOSCOPY  EBUS , APC CRYO (N/A )      BRONCHOSCOPY/TRANSBRONCHIAL NEEDLE BIOPSY      BRONCHOSCOPY/TRANSBRONCHIAL NEEDLE BIOPSY ADDL LOBE Diagnosis:  (LUNG CANCER)    Surgeon:  Marcell Clayton MD Responsible Provider:  Marleni Enriquez MD    Anesthesia Type:  general ASA Status:  3          Anesthesia Type: general    Sunshine Phase I: Sunshine Score: 10    Sunshine Phase II:      Last vitals: Reviewed and per EMR flowsheets.        Anesthesia Post Evaluation    Patient location during evaluation: PACU  Patient participation: complete - patient participated  Level of consciousness: awake and alert  Airway patency: patent  Nausea & Vomiting: no nausea and no vomiting  Complications: no  Cardiovascular status: hemodynamically stable and blood pressure returned to baseline  Respiratory status: acceptable  Hydration status: euvolemic Increased luminal thrombosis New Decline in EF AAA Chest pain

## (undated) DEVICE — SYRINGE 20ML LL S/C 50

## (undated) DEVICE — CAMERA STRYKER 1488 HD GEN

## (undated) DEVICE — Z INACTIVE USE 2660664 SOLUTION IRRIG 3000ML 0.9% SOD CHL USP UROMATIC PLAS CONT

## (undated) DEVICE — CHLORAPREP 26ML ORANGE

## (undated) DEVICE — SURGICAL PROCEDURE PACK BASIC

## (undated) DEVICE — DRIP REDUCTION MANIFOLD

## (undated) DEVICE — TROCAR ENDOSCP L100MM DIA12MM UNIV SL OBT RADLUC STBL SL

## (undated) DEVICE — POUCH, INSTRUMENT, 3POCKET, INVISISHIELD: Brand: MEDLINE

## (undated) DEVICE — AGENT HEMSTAT W4XL4IN OXIDIZED REGENERATED CELOS STRUCTURED

## (undated) DEVICE — DEVICE SUT 0 L48IN GRN POLY BRAID LD UNIT DISP SURGDAC

## (undated) DEVICE — Device: Brand: MEDEX

## (undated) DEVICE — INTENDED FOR TISSUE SEPARATION, AND OTHER PROCEDURES THAT REQUIRE A SHARP SURGICAL BLADE TO PUNCTURE OR CUT.: Brand: BARD-PARKER ® STAINLESS STEEL BLADES

## (undated) DEVICE — TOTAL TRAY, DB, 100% SILI FOLEY, 16FR 10: Brand: MEDLINE

## (undated) DEVICE — RELOAD STPL H1-2.5X45MM VASC THN TISS WHT 6 ROW B FRM SGL

## (undated) DEVICE — SURGICAL PROCEDURE PACK BRONCH

## (undated) DEVICE — NEEDLE INSUF L120MM DIA2MM DISP FOR PNEUMOPERI ENDOPATH

## (undated) DEVICE — GLOVE ORANGE PI 7 1/2   MSG9075

## (undated) DEVICE — SOLUTION IV IRRIG 500ML 0.9% SODIUM CHL 2F7123

## (undated) DEVICE — SKIN AFFIX SURG ADHESIVE 72/CS 0.55ML: Brand: MEDLINE

## (undated) DEVICE — JELLY LUBRICATING 4OZ FLIP TOP TB E Z

## (undated) DEVICE — APPLIER CLP L SHFT DIA12MM 20 ROT MULT LIGACLP

## (undated) DEVICE — CUTTER ENDOSCP L340MM LIN ARTC SGL STROKE FIRING ENDOPATH

## (undated) DEVICE — TROCAR ENDOSCP L100MM DIA12MM DIL TIP STBL SL ENDOPATH XCEL

## (undated) DEVICE — KIT,ANTI FOG,W/SPONGE & FLUID,SOFT PACK: Brand: MEDLINE

## (undated) DEVICE — ADAPTER TBNG DIA15MM SWVL FBROPT BRONCHSCP TERM 2 AXIS PEEP

## (undated) DEVICE — NEEDLE ASPIR 21GA L700MM US GUID TREAT DST END FOR EFFICIENT

## (undated) DEVICE — DRAIN SURG W3/4XL36IN FLAT

## (undated) DEVICE — DEVICE SUT SHFT L34CM DIA 10MM 2 JAW LD UNIT ENDOSTCH

## (undated) DEVICE — TROCAR ENDOSCP L100MM DIA5MM BLDELSS STBL SL OBT RADLUC

## (undated) DEVICE — PUMP SUC IRR TBNG L10FT W/ HNDPC ASSEMB STRYKEFLOW 2

## (undated) DEVICE — SET ENDO INSTR LAPAROSCOPIC STGENLAP

## (undated) DEVICE — PATIENT RETURN ELECTRODE, SINGLE-USE, CONTACT QUALITY MONITORING, ADULT, WITH 9FT CORD, FOR PATIENTS WEIGING OVER 33LBS. (15KG): Brand: MEGADYNE

## (undated) DEVICE — TOWEL,OR,DSP,ST,BLUE,STD,6/PK,12PK/CS: Brand: MEDLINE

## (undated) DEVICE — INSUFFLATION TUBING SET WITH FILTER, FUNNEL CONNECTOR AND LUER LOCK: Brand: JOSNOE MEDICAL INC

## (undated) DEVICE — TIBURON GENERAL ENDOSCOPY DRAPE: Brand: CONVERTORS

## (undated) DEVICE — LOOP LIG SUT SZ 0 L18IN ABSRB POLYDIOXANONE MFIL PDS II

## (undated) DEVICE — SHEARS ENDOSCP L36CM DIA5MM ULTRASONIC CRV TIP HARM

## (undated) DEVICE — INTEGRA® JARIT® WEBSTER NEEDLE HOLDER, 4-5/8", CARB-BITE™, SMOOTH JAWS: Brand: INTEGRA® JARIT®

## (undated) DEVICE — GOWN,SIRUS,FABRNF,XL,20/CS: Brand: MEDLINE

## (undated) DEVICE — TROCAR ENDOSCP SHFT L100MM DIA5MM DIL TIP ENDOPATH XCEL

## (undated) DEVICE — LAPAROSCOPIC SCISSORS: Brand: EPIX LAPAROSCOPIC SCISSORS

## (undated) DEVICE — PAD BD CONVOLUTED FOAM

## (undated) DEVICE — Device

## (undated) DEVICE — SET INSTRUMENT LAP I

## (undated) DEVICE — Device: Brand: BALLOON

## (undated) DEVICE — FORCEPS BX OVL CUP FEN DISPOSABLE CAP L 160CM RAD JAW 4

## (undated) DEVICE — NEEDLE CLOSURE OMNICLOSE